# Patient Record
Sex: MALE | Race: ASIAN | NOT HISPANIC OR LATINO | URBAN - METROPOLITAN AREA
[De-identification: names, ages, dates, MRNs, and addresses within clinical notes are randomized per-mention and may not be internally consistent; named-entity substitution may affect disease eponyms.]

---

## 2023-08-29 ENCOUNTER — INPATIENT (INPATIENT)
Facility: HOSPITAL | Age: 88
LOS: 2 days | Discharge: HOME | End: 2023-09-01
Attending: INTERNAL MEDICINE | Admitting: INTERNAL MEDICINE
Payer: MEDICARE

## 2023-08-29 VITALS
DIASTOLIC BLOOD PRESSURE: 56 MMHG | OXYGEN SATURATION: 95 % | TEMPERATURE: 98 F | RESPIRATION RATE: 20 BRPM | SYSTOLIC BLOOD PRESSURE: 112 MMHG | HEART RATE: 84 BPM

## 2023-08-29 DIAGNOSIS — R06.02 SHORTNESS OF BREATH: ICD-10-CM

## 2023-08-29 LAB
ALBUMIN SERPL ELPH-MCNC: 3.3 G/DL — SIGNIFICANT CHANGE UP (ref 3.3–5)
ALP SERPL-CCNC: 60 U/L — SIGNIFICANT CHANGE UP (ref 40–120)
ALT FLD-CCNC: 15 U/L — SIGNIFICANT CHANGE UP (ref 4–41)
ANION GAP SERPL CALC-SCNC: 12 MMOL/L — SIGNIFICANT CHANGE UP (ref 7–14)
AST SERPL-CCNC: 14 U/L — SIGNIFICANT CHANGE UP (ref 4–40)
B PERT DNA SPEC QL NAA+PROBE: SIGNIFICANT CHANGE UP
B PERT+PARAPERT DNA PNL SPEC NAA+PROBE: SIGNIFICANT CHANGE UP
BASE EXCESS BLDV CALC-SCNC: -1.7 MMOL/L — SIGNIFICANT CHANGE UP (ref -2–3)
BASOPHILS # BLD AUTO: 0.04 K/UL — SIGNIFICANT CHANGE UP (ref 0–0.2)
BASOPHILS NFR BLD AUTO: 0.2 % — SIGNIFICANT CHANGE UP (ref 0–2)
BILIRUB SERPL-MCNC: 0.5 MG/DL — SIGNIFICANT CHANGE UP (ref 0.2–1.2)
BLOOD GAS VENOUS COMPREHENSIVE RESULT: SIGNIFICANT CHANGE UP
BORDETELLA PARAPERTUSSIS (RAPRVP): SIGNIFICANT CHANGE UP
BUN SERPL-MCNC: 30 MG/DL — HIGH (ref 7–23)
C PNEUM DNA SPEC QL NAA+PROBE: SIGNIFICANT CHANGE UP
CALCIUM SERPL-MCNC: 8.9 MG/DL — SIGNIFICANT CHANGE UP (ref 8.4–10.5)
CHLORIDE BLDV-SCNC: 95 MMOL/L — LOW (ref 96–108)
CHLORIDE SERPL-SCNC: 92 MMOL/L — LOW (ref 98–107)
CO2 BLDV-SCNC: 24.2 MMOL/L — SIGNIFICANT CHANGE UP (ref 22–26)
CO2 SERPL-SCNC: 22 MMOL/L — SIGNIFICANT CHANGE UP (ref 22–31)
CREAT SERPL-MCNC: 1.58 MG/DL — HIGH (ref 0.5–1.3)
EGFR: 42 ML/MIN/1.73M2 — LOW
EOSINOPHIL # BLD AUTO: 0.02 K/UL — SIGNIFICANT CHANGE UP (ref 0–0.5)
EOSINOPHIL NFR BLD AUTO: 0.1 % — SIGNIFICANT CHANGE UP (ref 0–6)
FLUAV SUBTYP SPEC NAA+PROBE: SIGNIFICANT CHANGE UP
FLUBV RNA SPEC QL NAA+PROBE: SIGNIFICANT CHANGE UP
GAS PNL BLDV: 126 MMOL/L — LOW (ref 136–145)
GLUCOSE BLDV-MCNC: 279 MG/DL — HIGH (ref 70–99)
GLUCOSE SERPL-MCNC: 270 MG/DL — HIGH (ref 70–99)
HADV DNA SPEC QL NAA+PROBE: SIGNIFICANT CHANGE UP
HCO3 BLDV-SCNC: 23 MMOL/L — SIGNIFICANT CHANGE UP (ref 22–29)
HCOV 229E RNA SPEC QL NAA+PROBE: SIGNIFICANT CHANGE UP
HCOV HKU1 RNA SPEC QL NAA+PROBE: SIGNIFICANT CHANGE UP
HCOV NL63 RNA SPEC QL NAA+PROBE: SIGNIFICANT CHANGE UP
HCOV OC43 RNA SPEC QL NAA+PROBE: SIGNIFICANT CHANGE UP
HCT VFR BLD CALC: 36.9 % — LOW (ref 39–50)
HCT VFR BLDA CALC: 34 % — LOW (ref 39–51)
HGB BLD CALC-MCNC: 11.2 G/DL — LOW (ref 12.6–17.4)
HGB BLD-MCNC: 12.2 G/DL — LOW (ref 13–17)
HMPV RNA SPEC QL NAA+PROBE: SIGNIFICANT CHANGE UP
HPIV1 RNA SPEC QL NAA+PROBE: SIGNIFICANT CHANGE UP
HPIV2 RNA SPEC QL NAA+PROBE: SIGNIFICANT CHANGE UP
HPIV3 RNA SPEC QL NAA+PROBE: SIGNIFICANT CHANGE UP
HPIV4 RNA SPEC QL NAA+PROBE: SIGNIFICANT CHANGE UP
IANC: 13.6 K/UL — HIGH (ref 1.8–7.4)
IMM GRANULOCYTES NFR BLD AUTO: 0.6 % — SIGNIFICANT CHANGE UP (ref 0–0.9)
LACTATE BLDV-MCNC: 2.2 MMOL/L — HIGH (ref 0.5–2)
LYMPHOCYTES # BLD AUTO: 11.9 % — LOW (ref 13–44)
LYMPHOCYTES # BLD AUTO: 2.05 K/UL — SIGNIFICANT CHANGE UP (ref 1–3.3)
M PNEUMO DNA SPEC QL NAA+PROBE: SIGNIFICANT CHANGE UP
MCHC RBC-ENTMCNC: 29 PG — SIGNIFICANT CHANGE UP (ref 27–34)
MCHC RBC-ENTMCNC: 33.1 GM/DL — SIGNIFICANT CHANGE UP (ref 32–36)
MCV RBC AUTO: 87.9 FL — SIGNIFICANT CHANGE UP (ref 80–100)
MONOCYTES # BLD AUTO: 1.38 K/UL — HIGH (ref 0–0.9)
MONOCYTES NFR BLD AUTO: 8 % — SIGNIFICANT CHANGE UP (ref 2–14)
NEUTROPHILS # BLD AUTO: 13.6 K/UL — HIGH (ref 1.8–7.4)
NEUTROPHILS NFR BLD AUTO: 79.2 % — HIGH (ref 43–77)
NRBC # BLD: 0 /100 WBCS — SIGNIFICANT CHANGE UP (ref 0–0)
NRBC # FLD: 0 K/UL — SIGNIFICANT CHANGE UP (ref 0–0)
NT-PROBNP SERPL-SCNC: 1975 PG/ML — HIGH
PCO2 BLDV: 38 MMHG — LOW (ref 42–55)
PH BLDV: 7.39 — SIGNIFICANT CHANGE UP (ref 7.32–7.43)
PLATELET # BLD AUTO: 358 K/UL — SIGNIFICANT CHANGE UP (ref 150–400)
PO2 BLDV: 54 MMHG — HIGH (ref 25–45)
POTASSIUM BLDV-SCNC: 4.9 MMOL/L — SIGNIFICANT CHANGE UP (ref 3.5–5.1)
POTASSIUM SERPL-MCNC: 4.8 MMOL/L — SIGNIFICANT CHANGE UP (ref 3.5–5.3)
POTASSIUM SERPL-SCNC: 4.8 MMOL/L — SIGNIFICANT CHANGE UP (ref 3.5–5.3)
PROT SERPL-MCNC: 7.8 G/DL — SIGNIFICANT CHANGE UP (ref 6–8.3)
RAPID RVP RESULT: SIGNIFICANT CHANGE UP
RBC # BLD: 4.2 M/UL — SIGNIFICANT CHANGE UP (ref 4.2–5.8)
RBC # FLD: 13 % — SIGNIFICANT CHANGE UP (ref 10.3–14.5)
RSV RNA SPEC QL NAA+PROBE: SIGNIFICANT CHANGE UP
RV+EV RNA SPEC QL NAA+PROBE: SIGNIFICANT CHANGE UP
SAO2 % BLDV: 84.4 % — SIGNIFICANT CHANGE UP (ref 67–88)
SARS-COV-2 RNA SPEC QL NAA+PROBE: SIGNIFICANT CHANGE UP
SODIUM SERPL-SCNC: 126 MMOL/L — LOW (ref 135–145)
TROPONIN T, HIGH SENSITIVITY RESULT: 106 NG/L — CRITICAL HIGH
TROPONIN T, HIGH SENSITIVITY RESULT: 98 NG/L — CRITICAL HIGH
WBC # BLD: 17.2 K/UL — HIGH (ref 3.8–10.5)
WBC # FLD AUTO: 17.2 K/UL — HIGH (ref 3.8–10.5)

## 2023-08-29 PROCEDURE — 99285 EMERGENCY DEPT VISIT HI MDM: CPT

## 2023-08-29 PROCEDURE — 99233 SBSQ HOSP IP/OBS HIGH 50: CPT

## 2023-08-29 PROCEDURE — 71046 X-RAY EXAM CHEST 2 VIEWS: CPT | Mod: 26

## 2023-08-29 RX ORDER — SODIUM CHLORIDE 9 MG/ML
500 INJECTION, SOLUTION INTRAVENOUS ONCE
Refills: 0 | Status: DISCONTINUED | OUTPATIENT
Start: 2023-08-29 | End: 2023-08-29

## 2023-08-29 RX ORDER — IPRATROPIUM/ALBUTEROL SULFATE 18-103MCG
3 AEROSOL WITH ADAPTER (GRAM) INHALATION ONCE
Refills: 0 | Status: COMPLETED | OUTPATIENT
Start: 2023-08-29 | End: 2023-08-29

## 2023-08-29 RX ORDER — FUROSEMIDE 40 MG
20 TABLET ORAL ONCE
Refills: 0 | Status: COMPLETED | OUTPATIENT
Start: 2023-08-29 | End: 2023-08-29

## 2023-08-29 RX ORDER — INSULIN LISPRO 100/ML
2 VIAL (ML) SUBCUTANEOUS ONCE
Refills: 0 | Status: COMPLETED | OUTPATIENT
Start: 2023-08-29 | End: 2023-08-29

## 2023-08-29 RX ADMIN — Medication 3 MILLILITER(S): at 18:53

## 2023-08-29 RX ADMIN — Medication 20 MILLIGRAM(S): at 22:37

## 2023-08-29 NOTE — ED ADULT NURSE NOTE - OBJECTIVE STATEMENT
Pt awake and alert, Phx COPD (not on home 02) HTN, DM2 presenting to ED for worsening shortness of breath worse with exertion over the pat 2 weeks. Pt tachypneic and mildly wheezing in ED, 02 saturation on room air between 94 and 97%, pt denies chest pain, fevers, sick contacts. 20g IV placed in left AC, labs sent. Pt given Albuterol treatment as ordered, comfort measures provided, call bell in reach. Awaiting imaging and disposition.

## 2023-08-29 NOTE — H&P ADULT - PROBLEM SELECTOR PLAN 5
-LA 2.2 on admission   -repeat for the AM  -secondary to decompensated HF vs infection (possible diverticulitis)

## 2023-08-29 NOTE — ED PROVIDER NOTE - PROGRESS NOTE DETAILS
proBNPLabs showing elevated.  Creatinine also mildly elevated from baseline.  Troponin 98-1 06, less than 20% delta.  Patient without shortness of breath or chest pain at bedside.  Leukocytosis noted however no clear infectious etiology at this time.  Blood and urine cultures and UA sent.  Discussed case with hospitalist will send off procalcitonin as well.  At this time overall suspicion for new CHF.  Will give Lasix coverage with Admelog.  Cardiology consulted.

## 2023-08-29 NOTE — H&P ADULT - PROBLEM SELECTOR PLAN 6
-at home on glimepiride 2 mg BID, jardiance 10 mg daily and metformin 500 mg in AM and 1000 mg in the PM.   -will do correctional insulin for now and 5 units of lantus for now, adjust based on insulin requirements   -A1C in AM  -BG ACHS

## 2023-08-29 NOTE — H&P ADULT - NSHPSOCIALHISTORY_GEN_ALL_CORE
Lives at home with wife. Not working. Quit 30 years, for 20 years x50 cig. former heavy drinker for 10 years, quit 6 months ago. No other drugs.

## 2023-08-29 NOTE — H&P ADULT - NSHPPHYSICALEXAM_GEN_ALL_CORE
Vital Signs Last 24 Hrs  T(C): 36.4 (30 Aug 2023 02:24), Max: 36.9 (29 Aug 2023 16:56)  T(F): 97.6 (30 Aug 2023 02:24), Max: 98.4 (29 Aug 2023 16:56)  HR: 84 (30 Aug 2023 02:24) (65 - 84)  BP: 129/59 (30 Aug 2023 02:24) (111/72 - 129/59)  BP(mean): --  RR: 20 (30 Aug 2023 02:24) (20 - 25)  SpO2: 98% (30 Aug 2023 02:24) (95% - 98%)    Parameters below as of 30 Aug 2023 02:24  Patient On (Oxygen Delivery Method): room air        CONSTITUTIONAL: Well-groomed, in no apparent distress  EYES: No conjunctival or scleral injection, non-icteric; PERRLA and symmetric  ENMT: No external nasal lesions; nasal mucosa not inflamed; normal dentition; oral mucosa with moist membranes  NECK: Trachea midline without palpable neck mass; thyroid not enlarged and non-tender, JVD 7 cm  RESPIRATORY: Breathing comfortably; lungs CTA without rales bilaterally, no wheezing or rhonchi.   CARDIOVASCULAR: +S1S2, RRR, with systolic murmur left sternal border and apex; no carotid bruits; pedal pulses full and symmetric; no lower extremity edema  GASTROINTESTINAL: No palpable masses, pain to palpation of the left abdomen, +BS throughout, no rebound/guarding; no hepatosplenomegaly; no hernia palpated  MUSCULOSKELETAL: no digital clubbing or cyanosis; no paraspinal tenderness;  normal strength and tone of extremities  SKIN: No rashes or ulcers noted; no subcutaneous nodules or induration palpable  NEUROLOGIC: CN II-XII intact; normal reflexes in upper and lower extremities; sensation intact in LEs b/l to light touch  PSYCHIATRIC: A+O x 3; mood and affect appropriate; appropriate insight and judgment

## 2023-08-29 NOTE — H&P ADULT - NSICDXPASTMEDICALHX_GEN_ALL_CORE_FT
PAST MEDICAL HISTORY:  COPD (chronic obstructive pulmonary disease)     DM (diabetes mellitus)     Former smoker     HLD (hyperlipidemia)     HTN (hypertension)

## 2023-08-29 NOTE — ED ADULT NURSE NOTE - NSFALLRISKINTERV_ED_ALL_ED

## 2023-08-29 NOTE — ED ADULT TRIAGE NOTE - CHIEF COMPLAINT QUOTE
Pt c/o intermittent CP, SOB x 2 weeks. Pt in no acute distress. SpO2 95% on RA. Pt sent to ED by cardiologist for further work up. PMHx COPD, DM2

## 2023-08-29 NOTE — H&P ADULT - HISTORY OF PRESENT ILLNESS
Mr. Bajwa is a 87 year old M with history of DM2 (non-insulin dependent, HTN, HLD. COPD who presents with chest pressure and dyspnea at rest for the last week. He describes the chest pressure as pinch like sensation as if plastic is sitting on his chest. He states it is non-exertional occurs when he is lying down, associated with orthopnea and shortness of breath. He states over the last week he has had a decline in his urine output with associated dysuria, which has progressed to not being able to urinate in the last day. He  Mr. Bajwa is a 87 year old M with history of DM2 (non-insulin dependent, HTN, HLD. COPD who presents with chest pressure and dyspnea at rest for the last week. He describes the chest pressure as pinch like sensation as if plastic is sitting on his chest (substernal). He states it is non-exertional occurs when he is lying down. He states over the last week he has had a decline in his urine output with associated dysuria, which has progressed to not being able to urinate in the last day. He  reports that the pain is intermittent and occurs when he is lying down (non-exertional). He denies numbness and tingling, diaphoresis, lightheadedness, palpitations, jaw pain, nausea, and any radiation. He reports that movement worsens his pain. The chest pain occurs for hours and lasts for a few times a week. The chest pain has been occurring for the past several weeks. He reports he has had shortness of breath with exertion, but has not worsened. He reports that he uses a cane at baseline. He reports orthopnea and shortness of breath. He states over the last week he has had a decline in his urine output with associated dysuria, which has progressed to not being able to urinate in the last day.  Currently denies having chest pain.  Denies lightheadedness, palpitations, syncope, or increasing lower extremity edema.   He was sent from  Dr. Duyen Black MD office.  Labs were sig for the following WBC 17, trop 98-> 106, BUN/Cr 30/1.58, , EGFR 42, pro-BNP 1975,, LA 2.2. EKG was sig for the following: normal sinus rhythm HR 79 t wave inversion V6, and QTc 403 ms. TTE 05/2019 showed EF 67%, grade 1 diastolic dysfunction, sclerotic aortic valve with mild AR,  mild pulm HTN measuring 43 mmHg. He states     He also reports he has lost 10 pounds in the last 6 months with associated decrease in appetite.  He reports a few months ago his urine test was positive for blood for which he had it worked up per patient.    Mr. Liao is a 87 year old M with history of DM2 (non-insulin dependent, HTN, HLD. COPD who presents with chest pressure and dyspnea at rest for the last week. He describes the chest pressure as pinch like sensation as if plastic is sitting on his chest (substernal). He states it is non-exertional occurs when he is lying down. He states over the last week he has had a decline in his urine output with associated dysuria, which has progressed to not being able to urinate in the last day. He  reports that the pain is intermittent and occurs when he is lying down (non-exertional). He denies numbness and tingling, diaphoresis, lightheadedness, palpitations, jaw pain, nausea, and any radiation. He reports that movement worsens his pain. The chest pain occurs for hours and lasts for a few times a week. The chest pain has been occurring for the past several weeks. He reports he has had shortness of breath with exertion, but has not worsened. He reports that he uses a cane at baseline. He reports orthopnea and shortness of breath. He states over the last week he has had a decline in his urine output with associated dysuria, which has progressed to not being able to urinate in the last day.  Currently denies having chest pain.  Denies lightheadedness, palpitations, syncope, or increasing lower extremity edema.   He was sent from  Dr. Duyen Black MD office.  Labs were sig for the following WBC 17, trop 98-> 106, BUN/Cr 30/1.58, , EGFR 42, pro-BNP 1975,, LA 2.2 and UA negative.  EKG was sig for the following: normal sinus rhythm HR 79 t wave inversion V6, and QTc 403 ms. TTE 05/2019 showed EF 67%, grade 1 diastolic dysfunction, sclerotic aortic valve with mild AR,  mild pulm HTN measuring 43 mmHg. He states he has had dysuria for 1 week and denies any hematuria. Also reports today he started to have left sided abdominal pain to palpation, noted when abdomen was palpated. CXR with bilateral reticular opacities.     He also reports he has lost 10 pounds in the last 6 months with associated decrease in appetite.  He reports a few months ago his urine test was positive for blood for which he had it worked up per patient.

## 2023-08-29 NOTE — H&P ADULT - PROBLEM SELECTOR PLAN 3
-Cr is 1.58, unknown baseline   -serum protein/Cr, Urine Na, and RBUS ordered to r/o obstruction   -UPEP/SPEP/serum and urine immunofixation and light chains ordered   -monitor intake and output every 4 hours   -consider renal consult pending above results.

## 2023-08-29 NOTE — H&P ADULT - PROBLEM SELECTOR PLAN 1
ddx: demand ischemia (secondary to likely infiltrative CM) vs ACS  -troponin 98->106, trend with CKMB to peak  -cardiac monitoring   -EKG in AM ordered   -TTE ordered   -cardiology/HF consult to be called in the AM

## 2023-08-29 NOTE — H&P ADULT - NSHPLABSRESULTS_GEN_ALL_CORE
12.2   17.20 )-----------( 358      ( 29 Aug 2023 18:39 )             36.9     126<L>  |  92<L>  |  30<H>  ----------------------------<  270<H>       4.8   |  22  |  1.58<H>    Ca    8.9      29 Aug 2023 18:39    TPro  7.8  /  Alb  3.3  /  TBili  0.5  /  DBili  x   /  AST  14  /  ALT  15  /  AlkPhos  60          18:39 - VBG - pH: 7.39  | pCO2: 38    | pO2: 54    | Lactate: 2.2        Procalcitonin - 0.08 (23 @ 20:28)      hs Troponin, T - 106 ng/L (23 @ 20:28)  hs Troponin, T - 98 ng/L (23 @ 18:39)      Pro-BNP: 1975 (23 @ 18:39)          Urinalysis Basic - ( 30 Aug 2023 00:26 )  Color: Yellow / Appearance: Clear / S.009 / pH: 6.0  Gluc: Negative mg/dL / Ketone: Negative mg/dL  / Bili: Negative / Urobili: 0.2 mg/dL   Blood: Negative / Protein: Negative mg/dL / Nitrite: Negative   Leuk Esterase: Negative / RBC: x / WBC x   Sq Epi: x / Non Sq Epi: x / Bacteria: x

## 2023-08-29 NOTE — H&P ADULT - PROBLEM SELECTOR PLAN 4
-WBC 17 and tenderness to abdomen with palpation  -CT abdomen and pelvis with oral contrast to r/o diverticulitis STAT  -Blood culturex2   -Will do ciprofloxacin IV BID and Flagyl IV TID  -UA negative, urine culture -pending   -consider surgical consult if LA continues to rise or abdominal pain worsens.  -continue to trend

## 2023-08-29 NOTE — H&P ADULT - PROBLEM SELECTOR PLAN 9
DVT prophylaxis: heparin 5000 units every 8 hours  GI prophylaxis: none   Diet: diabetes cardiac diet

## 2023-08-29 NOTE — ED PROVIDER NOTE - ATTENDING CONTRIBUTION TO CARE
87-year-old male past medical history of diabetes, hypertension, hypothermia presenting for shortness of breath and chest pain, increased urine production..  No known prior cardiac history per patient and son.  No recent stress test.  He denies fever, chills, nausea, vomiting, diaphoresis, abdominal pain, back pain, extremity pain or swelling.  Exam as above, wheezing/crackles throughout lung fields.  Differential diagnosis includes, but not limited to, CHF, pneumonia,  COPD.  Plan: Labs, chest x-ray, nebs, reassess.

## 2023-08-29 NOTE — H&P ADULT - PROBLEM SELECTOR PLAN 7
continue losartan 100n mg daily and amlodipine 2.5 mg daily with BP parameters   -Patient will likely need b-blocker, likely will dc amlodipine and started B blocker if HF reduced on TTE  -continue to monitor BP

## 2023-08-29 NOTE — ED PROVIDER NOTE - OBJECTIVE STATEMENT
88 yo M with hx of DM, HTN, HLD presenting w/ c/o of dyspnea with intermittent CP. Pt with no hx of cardiac disease. Last stress was normal in 2019. Pt has hx of extensive hx of smoking though he quit years ago. No fever, chills, nausea, vomiting, diarrhea. He has chest pain at rest, and notes he had a cough for 4 consecutive months. Pt also noticing increased sputum production. He denies any LE pain or swelling.

## 2023-08-29 NOTE — H&P ADULT - PROBLEM SELECTOR PLAN 2
ddx: Infiltrative CM vs Ischemic CM   -CXR with bilateral reticular opacities  -BNP 1975  -IV lasix 40 mg IV x1 now considering poor response to IV 20 mg lasix overnight, adjust diuresis based on urine output  -monitor intake and output every 4 hours  -HF consult in the AM to be called by day team.  -Team to order standing diuresis in the AM based on urine output

## 2023-08-29 NOTE — H&P ADULT - NSHPREVIEWOFSYSTEMS_GEN_ALL_CORE
Review of Systems:   CONSTITUTIONAL: No fever, +weight loss  EYES: No eye pain, visual disturbances, or discharge  ENMT:  No difficulty hearing, tinnitus, vertigo; No sinus or throat pain  RESPIRATORY: +SOB. No cough, wheezing, chills or hemoptysis  CARDIOVASCULAR: + chest pain, palpitations, dizziness, or leg swelling  GASTROINTESTINAL: + abdominal or epigastric pain. No nausea, vomiting, or hematemesis; No diarrhea or constipation. No melena or hematochezia.  GENITOURINARY: +dysuria, no frequency, hematuria, decrease urinary output   NEUROLOGICAL: No headaches, memory loss, loss of strength, numbness, or tremors  SKIN: No itching, burning, rashes, or lesions   ENDOCRINE: No heat or cold intolerance; No hair loss  MUSCULOSKELETAL: No joint pain or swelling; No muscle, back pain  PSYCHIATRIC: No depression, anxiety, mood swings, or difficulty sleeping

## 2023-08-29 NOTE — H&P ADULT - ASSESSMENT
Mr. Liao is a 87 year old M with history of DM2 (non-insulin dependent, HTN, HLD. COPD who presents with progressive chest pressure over the last few weeks. Patient has dyspnea at rest for the last week.

## 2023-08-29 NOTE — ED PROVIDER NOTE - CLINICAL SUMMARY MEDICAL DECISION MAKING FREE TEXT BOX
88 yo M with hx of DM, HTN, HLD presenting w/ c/o of dyspnea with intermittent CP. Differential diagnosis includes but is not limited to COPD exacerbation, ACS, pneumonia unlikely given prolonged course of cough, ILD, CHF possible less likely in absence of peripheral edema/crackles. would get labs, ecg, cxr, tx symptomatically with duonebs and reassess for improvement. pt needs PFTs and likely needs to be on maintenance medications for COPD.

## 2023-08-29 NOTE — ED PROVIDER NOTE - PHYSICAL EXAMINATION
General: well -appearing, no acute distress  Head: Atraumatic, normocephalic  Eyes: EOM grossly in tact, no scleral icterus, no discharge  Neurology: A&Ox 3, nonfocal, CLAY x 4  Respiratory: decreased air movement, wheezing throughout lung fields  CV: RRR, good s1/s2, no S3, Extremities warm and well perfused  Abdominal: Soft, non-distended, non-tender, no masses  Extremities: No edema, no deformities  Skin: warm and dry. No rashes

## 2023-08-30 DIAGNOSIS — R33.9 RETENTION OF URINE, UNSPECIFIED: ICD-10-CM

## 2023-08-30 DIAGNOSIS — E78.5 HYPERLIPIDEMIA, UNSPECIFIED: ICD-10-CM

## 2023-08-30 DIAGNOSIS — Z71.89 OTHER SPECIFIED COUNSELING: ICD-10-CM

## 2023-08-30 DIAGNOSIS — N17.9 ACUTE KIDNEY FAILURE, UNSPECIFIED: ICD-10-CM

## 2023-08-30 DIAGNOSIS — R79.89 OTHER SPECIFIED ABNORMAL FINDINGS OF BLOOD CHEMISTRY: ICD-10-CM

## 2023-08-30 DIAGNOSIS — R06.00 DYSPNEA, UNSPECIFIED: ICD-10-CM

## 2023-08-30 DIAGNOSIS — I10 ESSENTIAL (PRIMARY) HYPERTENSION: ICD-10-CM

## 2023-08-30 DIAGNOSIS — Z29.9 ENCOUNTER FOR PROPHYLACTIC MEASURES, UNSPECIFIED: ICD-10-CM

## 2023-08-30 DIAGNOSIS — R07.9 CHEST PAIN, UNSPECIFIED: ICD-10-CM

## 2023-08-30 DIAGNOSIS — D72.829 ELEVATED WHITE BLOOD CELL COUNT, UNSPECIFIED: ICD-10-CM

## 2023-08-30 DIAGNOSIS — E11.9 TYPE 2 DIABETES MELLITUS WITHOUT COMPLICATIONS: ICD-10-CM

## 2023-08-30 LAB
A1C WITH ESTIMATED AVERAGE GLUCOSE RESULT: 8 % — HIGH (ref 4–5.6)
ALBUMIN SERPL ELPH-MCNC: 3.5 G/DL — SIGNIFICANT CHANGE UP (ref 3.3–5)
ALP SERPL-CCNC: 69 U/L — SIGNIFICANT CHANGE UP (ref 40–120)
ALT FLD-CCNC: 17 U/L — SIGNIFICANT CHANGE UP (ref 4–41)
ANION GAP SERPL CALC-SCNC: 18 MMOL/L — HIGH (ref 7–14)
APPEARANCE UR: CLEAR — SIGNIFICANT CHANGE UP
APPEARANCE UR: CLEAR — SIGNIFICANT CHANGE UP
APTT BLD: 25.3 SEC — SIGNIFICANT CHANGE UP (ref 24.5–35.6)
AST SERPL-CCNC: 18 U/L — SIGNIFICANT CHANGE UP (ref 4–40)
BASOPHILS # BLD AUTO: 0.04 K/UL — SIGNIFICANT CHANGE UP (ref 0–0.2)
BASOPHILS NFR BLD AUTO: 0.2 % — SIGNIFICANT CHANGE UP (ref 0–2)
BILIRUB SERPL-MCNC: 0.6 MG/DL — SIGNIFICANT CHANGE UP (ref 0.2–1.2)
BILIRUB UR-MCNC: NEGATIVE — SIGNIFICANT CHANGE UP
BILIRUB UR-MCNC: NEGATIVE — SIGNIFICANT CHANGE UP
BUN SERPL-MCNC: 28 MG/DL — HIGH (ref 7–23)
CALCIUM SERPL-MCNC: 9.5 MG/DL — SIGNIFICANT CHANGE UP (ref 8.4–10.5)
CHLORIDE SERPL-SCNC: 91 MMOL/L — LOW (ref 98–107)
CHOLEST SERPL-MCNC: 168 MG/DL — SIGNIFICANT CHANGE UP
CK MB BLD-MCNC: 5.3 % — HIGH (ref 0–2.5)
CK MB CFR SERPL CALC: 4.7 NG/ML — SIGNIFICANT CHANGE UP
CK MB CFR SERPL CALC: 5 NG/ML — SIGNIFICANT CHANGE UP
CK SERPL-CCNC: 95 U/L — SIGNIFICANT CHANGE UP (ref 30–200)
CO2 SERPL-SCNC: 21 MMOL/L — LOW (ref 22–31)
COLOR SPEC: YELLOW — SIGNIFICANT CHANGE UP
COLOR SPEC: YELLOW — SIGNIFICANT CHANGE UP
CREAT ?TM UR-MCNC: 41 MG/DL — SIGNIFICANT CHANGE UP
CREAT SERPL-MCNC: 1.47 MG/DL — HIGH (ref 0.5–1.3)
DIFF PNL FLD: NEGATIVE — SIGNIFICANT CHANGE UP
DIFF PNL FLD: NEGATIVE — SIGNIFICANT CHANGE UP
EGFR: 46 ML/MIN/1.73M2 — LOW
EOSINOPHIL # BLD AUTO: 0.17 K/UL — SIGNIFICANT CHANGE UP (ref 0–0.5)
EOSINOPHIL NFR BLD AUTO: 1 % — SIGNIFICANT CHANGE UP (ref 0–6)
ESTIMATED AVERAGE GLUCOSE: 183 — SIGNIFICANT CHANGE UP
GLUCOSE BLDC GLUCOMTR-MCNC: 149 MG/DL — HIGH (ref 70–99)
GLUCOSE BLDC GLUCOMTR-MCNC: 162 MG/DL — HIGH (ref 70–99)
GLUCOSE BLDC GLUCOMTR-MCNC: 171 MG/DL — HIGH (ref 70–99)
GLUCOSE BLDC GLUCOMTR-MCNC: 235 MG/DL — HIGH (ref 70–99)
GLUCOSE BLDC GLUCOMTR-MCNC: 241 MG/DL — HIGH (ref 70–99)
GLUCOSE SERPL-MCNC: 190 MG/DL — HIGH (ref 70–99)
GLUCOSE UR QL: NEGATIVE MG/DL — SIGNIFICANT CHANGE UP
GLUCOSE UR QL: NEGATIVE MG/DL — SIGNIFICANT CHANGE UP
HCT VFR BLD CALC: 38.2 % — LOW (ref 39–50)
HDLC SERPL-MCNC: 62 MG/DL — SIGNIFICANT CHANGE UP
HGB BLD-MCNC: 12.5 G/DL — LOW (ref 13–17)
IANC: 11.94 K/UL — HIGH (ref 1.8–7.4)
IMM GRANULOCYTES NFR BLD AUTO: 0.7 % — SIGNIFICANT CHANGE UP (ref 0–0.9)
INR BLD: 1.16 RATIO — SIGNIFICANT CHANGE UP (ref 0.85–1.18)
KETONES UR-MCNC: NEGATIVE MG/DL — SIGNIFICANT CHANGE UP
KETONES UR-MCNC: NEGATIVE MG/DL — SIGNIFICANT CHANGE UP
LACTATE SERPL-SCNC: 1.6 MMOL/L — SIGNIFICANT CHANGE UP (ref 0.5–2)
LACTATE SERPL-SCNC: 1.6 MMOL/L — SIGNIFICANT CHANGE UP (ref 0.5–2)
LEGIONELLA AG UR QL: NEGATIVE — SIGNIFICANT CHANGE UP
LEUKOCYTE ESTERASE UR-ACNC: NEGATIVE — SIGNIFICANT CHANGE UP
LEUKOCYTE ESTERASE UR-ACNC: NEGATIVE — SIGNIFICANT CHANGE UP
LIPID PNL WITH DIRECT LDL SERPL: 90 MG/DL — SIGNIFICANT CHANGE UP
LYMPHOCYTES # BLD AUTO: 16.6 % — SIGNIFICANT CHANGE UP (ref 13–44)
LYMPHOCYTES # BLD AUTO: 2.72 K/UL — SIGNIFICANT CHANGE UP (ref 1–3.3)
MAGNESIUM SERPL-MCNC: 2.5 MG/DL — SIGNIFICANT CHANGE UP (ref 1.6–2.6)
MCHC RBC-ENTMCNC: 29.4 PG — SIGNIFICANT CHANGE UP (ref 27–34)
MCHC RBC-ENTMCNC: 32.7 GM/DL — SIGNIFICANT CHANGE UP (ref 32–36)
MCV RBC AUTO: 89.9 FL — SIGNIFICANT CHANGE UP (ref 80–100)
MONOCYTES # BLD AUTO: 1.35 K/UL — HIGH (ref 0–0.9)
MONOCYTES NFR BLD AUTO: 8.3 % — SIGNIFICANT CHANGE UP (ref 2–14)
NEUTROPHILS # BLD AUTO: 11.94 K/UL — HIGH (ref 1.8–7.4)
NEUTROPHILS NFR BLD AUTO: 73.2 % — SIGNIFICANT CHANGE UP (ref 43–77)
NITRITE UR-MCNC: NEGATIVE — SIGNIFICANT CHANGE UP
NITRITE UR-MCNC: NEGATIVE — SIGNIFICANT CHANGE UP
NON HDL CHOLESTEROL: 106 MG/DL — SIGNIFICANT CHANGE UP
NRBC # BLD: 0 /100 WBCS — SIGNIFICANT CHANGE UP (ref 0–0)
NRBC # FLD: 0 K/UL — SIGNIFICANT CHANGE UP (ref 0–0)
PH UR: 6 — SIGNIFICANT CHANGE UP (ref 5–8)
PH UR: 6 — SIGNIFICANT CHANGE UP (ref 5–8)
PHOSPHATE SERPL-MCNC: 3.9 MG/DL — SIGNIFICANT CHANGE UP (ref 2.5–4.5)
PLATELET # BLD AUTO: 409 K/UL — HIGH (ref 150–400)
POTASSIUM SERPL-MCNC: 4.3 MMOL/L — SIGNIFICANT CHANGE UP (ref 3.5–5.3)
POTASSIUM SERPL-SCNC: 4.3 MMOL/L — SIGNIFICANT CHANGE UP (ref 3.5–5.3)
PROCALCITONIN SERPL-MCNC: 0.08 NG/ML — SIGNIFICANT CHANGE UP (ref 0.02–0.1)
PROT ?TM UR-MCNC: 8 MG/DL — SIGNIFICANT CHANGE UP
PROT ?TM UR-MCNC: 9 MG/DL — SIGNIFICANT CHANGE UP
PROT SERPL-MCNC: 8.1 G/DL — SIGNIFICANT CHANGE UP (ref 6–8.3)
PROT SERPL-MCNC: 9 G/DL — HIGH (ref 6–8.3)
PROT UR-MCNC: NEGATIVE MG/DL — SIGNIFICANT CHANGE UP
PROT UR-MCNC: NEGATIVE MG/DL — SIGNIFICANT CHANGE UP
PROT/CREAT UR-RTO: 0.2 RATIO — SIGNIFICANT CHANGE UP (ref 0–0.2)
PROTHROM AB SERPL-ACNC: 13 SEC — SIGNIFICANT CHANGE UP (ref 9.5–13)
RBC # BLD: 4.25 M/UL — SIGNIFICANT CHANGE UP (ref 4.2–5.8)
RBC # FLD: 12.8 % — SIGNIFICANT CHANGE UP (ref 10.3–14.5)
SODIUM SERPL-SCNC: 130 MMOL/L — LOW (ref 135–145)
SODIUM UR-SCNC: 22 MMOL/L — SIGNIFICANT CHANGE UP
SP GR SPEC: 1.01 — SIGNIFICANT CHANGE UP (ref 1–1.03)
SP GR SPEC: 1.01 — SIGNIFICANT CHANGE UP (ref 1–1.03)
TRIGL SERPL-MCNC: 78 MG/DL — SIGNIFICANT CHANGE UP
TROPONIN T, HIGH SENSITIVITY RESULT: 92 NG/L — CRITICAL HIGH
UROBILINOGEN FLD QL: 0.2 MG/DL — SIGNIFICANT CHANGE UP (ref 0.2–1)
UROBILINOGEN FLD QL: 0.2 MG/DL — SIGNIFICANT CHANGE UP (ref 0.2–1)
WBC # BLD: 16.34 K/UL — HIGH (ref 3.8–10.5)
WBC # FLD AUTO: 16.34 K/UL — HIGH (ref 3.8–10.5)

## 2023-08-30 PROCEDURE — 99497 ADVNCD CARE PLAN 30 MIN: CPT | Mod: 25

## 2023-08-30 PROCEDURE — 93306 TTE W/DOPPLER COMPLETE: CPT | Mod: 26

## 2023-08-30 PROCEDURE — 86334 IMMUNOFIX E-PHORESIS SERUM: CPT | Mod: 26

## 2023-08-30 PROCEDURE — 76770 US EXAM ABDO BACK WALL COMP: CPT | Mod: 26

## 2023-08-30 PROCEDURE — 71250 CT THORAX DX C-: CPT | Mod: 26

## 2023-08-30 PROCEDURE — 99223 1ST HOSP IP/OBS HIGH 75: CPT | Mod: 25

## 2023-08-30 PROCEDURE — 84166 PROTEIN E-PHORESIS/URINE/CSF: CPT | Mod: 26

## 2023-08-30 PROCEDURE — 86335 IMMUNFIX E-PHORSIS/URINE/CSF: CPT | Mod: 26

## 2023-08-30 PROCEDURE — 99222 1ST HOSP IP/OBS MODERATE 55: CPT

## 2023-08-30 RX ORDER — TAMSULOSIN HYDROCHLORIDE 0.4 MG/1
0.4 CAPSULE ORAL AT BEDTIME
Refills: 0 | Status: DISCONTINUED | OUTPATIENT
Start: 2023-08-30 | End: 2023-09-01

## 2023-08-30 RX ORDER — HEPARIN SODIUM 5000 [USP'U]/ML
5000 INJECTION INTRAVENOUS; SUBCUTANEOUS EVERY 8 HOURS
Refills: 0 | Status: DISCONTINUED | OUTPATIENT
Start: 2023-08-30 | End: 2023-08-31

## 2023-08-30 RX ORDER — DEXTROSE 50 % IN WATER 50 %
25 SYRINGE (ML) INTRAVENOUS ONCE
Refills: 0 | Status: DISCONTINUED | OUTPATIENT
Start: 2023-08-30 | End: 2023-09-01

## 2023-08-30 RX ORDER — MONTELUKAST 4 MG/1
10 TABLET, CHEWABLE ORAL DAILY
Refills: 0 | Status: DISCONTINUED | OUTPATIENT
Start: 2023-08-30 | End: 2023-09-01

## 2023-08-30 RX ORDER — SIMETHICONE 80 MG/1
80 TABLET, CHEWABLE ORAL EVERY 6 HOURS
Refills: 0 | Status: DISCONTINUED | OUTPATIENT
Start: 2023-08-30 | End: 2023-09-01

## 2023-08-30 RX ORDER — DEXTROSE 50 % IN WATER 50 %
12.5 SYRINGE (ML) INTRAVENOUS ONCE
Refills: 0 | Status: DISCONTINUED | OUTPATIENT
Start: 2023-08-30 | End: 2023-09-01

## 2023-08-30 RX ORDER — SODIUM CHLORIDE 9 MG/ML
1000 INJECTION, SOLUTION INTRAVENOUS
Refills: 0 | Status: DISCONTINUED | OUTPATIENT
Start: 2023-08-30 | End: 2023-09-01

## 2023-08-30 RX ORDER — FUROSEMIDE 40 MG
20 TABLET ORAL
Refills: 0 | Status: DISCONTINUED | OUTPATIENT
Start: 2023-08-30 | End: 2023-08-30

## 2023-08-30 RX ORDER — INSULIN LISPRO 100/ML
VIAL (ML) SUBCUTANEOUS
Refills: 0 | Status: DISCONTINUED | OUTPATIENT
Start: 2023-08-30 | End: 2023-09-01

## 2023-08-30 RX ORDER — AMLODIPINE BESYLATE 2.5 MG/1
2.5 TABLET ORAL DAILY
Refills: 0 | Status: DISCONTINUED | OUTPATIENT
Start: 2023-08-30 | End: 2023-08-31

## 2023-08-30 RX ORDER — ATORVASTATIN CALCIUM 80 MG/1
10 TABLET, FILM COATED ORAL AT BEDTIME
Refills: 0 | Status: DISCONTINUED | OUTPATIENT
Start: 2023-08-30 | End: 2023-09-01

## 2023-08-30 RX ORDER — POLYETHYLENE GLYCOL 3350 17 G/17G
17 POWDER, FOR SOLUTION ORAL
Refills: 0 | Status: DISCONTINUED | OUTPATIENT
Start: 2023-08-30 | End: 2023-09-01

## 2023-08-30 RX ORDER — CEFTRIAXONE 500 MG/1
1000 INJECTION, POWDER, FOR SOLUTION INTRAMUSCULAR; INTRAVENOUS EVERY 24 HOURS
Refills: 0 | Status: DISCONTINUED | OUTPATIENT
Start: 2023-08-30 | End: 2023-08-30

## 2023-08-30 RX ORDER — LANOLIN ALCOHOL/MO/W.PET/CERES
3 CREAM (GRAM) TOPICAL AT BEDTIME
Refills: 0 | Status: DISCONTINUED | OUTPATIENT
Start: 2023-08-30 | End: 2023-09-01

## 2023-08-30 RX ORDER — INSULIN LISPRO 100/ML
VIAL (ML) SUBCUTANEOUS AT BEDTIME
Refills: 0 | Status: DISCONTINUED | OUTPATIENT
Start: 2023-08-30 | End: 2023-09-01

## 2023-08-30 RX ORDER — ALBUTEROL 90 UG/1
2 AEROSOL, METERED ORAL EVERY 6 HOURS
Refills: 0 | Status: DISCONTINUED | OUTPATIENT
Start: 2023-08-30 | End: 2023-09-01

## 2023-08-30 RX ORDER — CIPROFLOXACIN LACTATE 400MG/40ML
VIAL (ML) INTRAVENOUS
Refills: 0 | Status: DISCONTINUED | OUTPATIENT
Start: 2023-08-30 | End: 2023-08-31

## 2023-08-30 RX ORDER — INSULIN GLARGINE 100 [IU]/ML
5 INJECTION, SOLUTION SUBCUTANEOUS AT BEDTIME
Refills: 0 | Status: DISCONTINUED | OUTPATIENT
Start: 2023-08-30 | End: 2023-09-01

## 2023-08-30 RX ORDER — ACETAMINOPHEN 500 MG
650 TABLET ORAL EVERY 6 HOURS
Refills: 0 | Status: DISCONTINUED | OUTPATIENT
Start: 2023-08-30 | End: 2023-09-01

## 2023-08-30 RX ORDER — LOSARTAN POTASSIUM 100 MG/1
100 TABLET, FILM COATED ORAL DAILY
Refills: 0 | Status: DISCONTINUED | OUTPATIENT
Start: 2023-08-30 | End: 2023-08-31

## 2023-08-30 RX ORDER — FUROSEMIDE 40 MG
20 TABLET ORAL DAILY
Refills: 0 | Status: DISCONTINUED | OUTPATIENT
Start: 2023-08-30 | End: 2023-08-30

## 2023-08-30 RX ORDER — METRONIDAZOLE 500 MG
500 TABLET ORAL EVERY 8 HOURS
Refills: 0 | Status: DISCONTINUED | OUTPATIENT
Start: 2023-08-30 | End: 2023-08-31

## 2023-08-30 RX ORDER — CIPROFLOXACIN LACTATE 400MG/40ML
400 VIAL (ML) INTRAVENOUS ONCE
Refills: 0 | Status: COMPLETED | OUTPATIENT
Start: 2023-08-30 | End: 2023-08-30

## 2023-08-30 RX ORDER — GLUCAGON INJECTION, SOLUTION 0.5 MG/.1ML
1 INJECTION, SOLUTION SUBCUTANEOUS ONCE
Refills: 0 | Status: DISCONTINUED | OUTPATIENT
Start: 2023-08-30 | End: 2023-09-01

## 2023-08-30 RX ORDER — CIPROFLOXACIN LACTATE 400MG/40ML
400 VIAL (ML) INTRAVENOUS EVERY 12 HOURS
Refills: 0 | Status: DISCONTINUED | OUTPATIENT
Start: 2023-08-30 | End: 2023-08-31

## 2023-08-30 RX ORDER — FUROSEMIDE 40 MG
40 TABLET ORAL ONCE
Refills: 0 | Status: COMPLETED | OUTPATIENT
Start: 2023-08-30 | End: 2023-08-30

## 2023-08-30 RX ORDER — DEXTROSE 50 % IN WATER 50 %
15 SYRINGE (ML) INTRAVENOUS ONCE
Refills: 0 | Status: DISCONTINUED | OUTPATIENT
Start: 2023-08-30 | End: 2023-09-01

## 2023-08-30 RX ADMIN — HEPARIN SODIUM 5000 UNIT(S): 5000 INJECTION INTRAVENOUS; SUBCUTANEOUS at 06:25

## 2023-08-30 RX ADMIN — Medication 10 MILLIGRAM(S): at 17:56

## 2023-08-30 RX ADMIN — Medication 1: at 13:42

## 2023-08-30 RX ADMIN — HEPARIN SODIUM 5000 UNIT(S): 5000 INJECTION INTRAVENOUS; SUBCUTANEOUS at 21:31

## 2023-08-30 RX ADMIN — HEPARIN SODIUM 5000 UNIT(S): 5000 INJECTION INTRAVENOUS; SUBCUTANEOUS at 13:43

## 2023-08-30 RX ADMIN — TAMSULOSIN HYDROCHLORIDE 0.4 MILLIGRAM(S): 0.4 CAPSULE ORAL at 21:30

## 2023-08-30 RX ADMIN — POLYETHYLENE GLYCOL 3350 17 GRAM(S): 17 POWDER, FOR SOLUTION ORAL at 17:56

## 2023-08-30 RX ADMIN — Medication 3 MILLIGRAM(S): at 21:31

## 2023-08-30 RX ADMIN — Medication 200 MILLIGRAM(S): at 18:15

## 2023-08-30 RX ADMIN — Medication 2: at 09:29

## 2023-08-30 RX ADMIN — POLYETHYLENE GLYCOL 3350 17 GRAM(S): 17 POWDER, FOR SOLUTION ORAL at 13:43

## 2023-08-30 RX ADMIN — Medication 200 MILLIGRAM(S): at 07:30

## 2023-08-30 RX ADMIN — Medication 100 MILLIGRAM(S): at 09:30

## 2023-08-30 RX ADMIN — MONTELUKAST 10 MILLIGRAM(S): 4 TABLET, CHEWABLE ORAL at 13:43

## 2023-08-30 RX ADMIN — Medication 40 MILLIGRAM(S): at 06:25

## 2023-08-30 RX ADMIN — ATORVASTATIN CALCIUM 10 MILLIGRAM(S): 80 TABLET, FILM COATED ORAL at 21:30

## 2023-08-30 RX ADMIN — INSULIN GLARGINE 5 UNIT(S): 100 INJECTION, SOLUTION SUBCUTANEOUS at 21:30

## 2023-08-30 RX ADMIN — AMLODIPINE BESYLATE 2.5 MILLIGRAM(S): 2.5 TABLET ORAL at 06:24

## 2023-08-30 RX ADMIN — LOSARTAN POTASSIUM 100 MILLIGRAM(S): 100 TABLET, FILM COATED ORAL at 06:24

## 2023-08-30 RX ADMIN — Medication 100 MILLIGRAM(S): at 17:56

## 2023-08-30 NOTE — PROGRESS NOTE ADULT - PROBLEM SELECTOR PLAN 5
-WBC 17 and tenderness to abdomen with palpation  -given benign exam today, hold off on CT abd  -bowel regimen-may need enema  -Blood culturex2 thus far negative  -Will do ciprofloxacin IV BID and Flagyl IV TID->will plan  -UA negative, urine culture -pending   -LA normalized

## 2023-08-30 NOTE — PROGRESS NOTE ADULT - PROBLEM SELECTOR PLAN 2
ddx: ischemic CM, possibly intrinsic lung disease  -CXR with bilateral reticular opacities  -BNP 1975  -c/w lasix 20mg IV daily  -appreciate cards input  -f/u echo  -check CT chest ddx: ischemic CM, possibly intrinsic lung disease?  -CXR with bilateral reticular opacities  -BNP 1975  -c/w lasix 20mg IV daily  -appreciate cards input  -f/u echo  -check CT chest-to rule out interstitial lung disease

## 2023-08-30 NOTE — CONSULT NOTE ADULT - ASSESSMENT
87 year old M with history of DM2, HTN, HLD. COPD sent from the office of Dr. Duyen Black.  Pt presents with mild, intermittent chest pain. Chest pain is reported to be substernal, "tight", mild in intensity, non-radiating, non-exertional and without any associated symptoms. There are no specific alleviating or exacerbating factors.  Duration of chest pain is for hours, occuring few times a week. It has been ongoing for past few weeks. There is no increase in severity or frequency of the chest pain.  Independent of chest pain, patient also reports dyspnea on exertion.  It improves with rest. Dyspnea has been ongoing for past few months.  There is no increase in severity or frequency.   He states over the last week he has had a decline in his urine output with associated dysuria, which has progressed to not being able to urinate in the last day.      # Elevated Troponin  - Patient with elevated CE 98-->106  - ScR elevated 1.58  - HD stable, not in decompensated HF state  - Impression- demand ischemia in the setting of elevated BNP  - Received one dose of Lasix 20mg IVP in ED, would continue 20mg daily and monitor strict i/o  - EKG show showed NSR@79BPM with TWI in V6  - Serial EKG PRN to assess for ST changes  - Continuous cardiac monitoring to monitor for arrhythmias  - CBC, CMP, coags, HbA1C, TSH, lipids for comorbidities, Trend cardiac enzymes  - ECHO: TTE in am  - EKG and CE not consistent with ACS, would not treat for acs        Thank you, if any questions or clinical situation changes please call spectra #[id number].  Case discussed with on call [facility] fellow Dr. Sunshine Woodall    Attending Attestation to follow   87 year old M with history of DM2, HTN, HLD. COPD sent from the office of Dr. Duyen Black.  Pt presents with mild, intermittent chest pain. Chest pain is reported to be substernal, "tight", mild in intensity, non-radiating, non-exertional and without any associated symptoms. There are no specific alleviating or exacerbating factors.  Duration of chest pain is for hours, occuring few times a week. It has been ongoing for past few weeks. There is no increase in severity or frequency of the chest pain.  Independent of chest pain, patient also reports dyspnea on exertion.  It improves with rest. Dyspnea has been ongoing for past few months.  There is no increase in severity or frequency.   He states over the last week he has had a decline in his urine output with associated dysuria, which has progressed to not being able to urinate in the last day.      # Elevated Troponin  - Patient with elevated CE 98-->106  - ScR elevated 1.58  - HD stable, not in decompensated HF state  - Impression- demand ischemia in the setting of elevated BNP  - Received one dose of Lasix 20mg IVP in ED, would continue 20mg daily and monitor strict i/o  - EKG show showed NSR@79BPM with TWI in I, aVL, V5 - V6  - Serial EKG PRN to assess for ST changes  - Continuous cardiac monitoring to monitor for arrhythmias  - CBC, CMP, coags, HbA1C, TSH, lipids for comorbidities, Trend cardiac enzymes  - ECHO: TTE in am  - EKG and CE not consistent with ACS, would not treat for acs        Thank you, if any questions or clinical situation changes please call spectra #[id number].  Case discussed with on call [facility] fellow Dr. Sunshine Woodall    Attending Attestation to follow

## 2023-08-30 NOTE — PROGRESS NOTE ADULT - PROBLEM SELECTOR PLAN 3
-Cr is 1.58, unknown baseline, improving with diuresis  -serum protein/Cr, Urine Na, and RBUS ordered to r/o obstruction   -UPEP/SPEP/serum and urine immunofixation and light chains ordered   -monitor intake and output every 4 hours   -consider renal consult pending above results.

## 2023-08-30 NOTE — CONSULT NOTE ADULT - SUBJECTIVE AND OBJECTIVE BOX
HPI: 87 year old M with history of DM2, HTN, HLD. COPD sent from Dr. Duyen Black MD office.  Pt presents with mild, intermittent chest pain. Chest pain is reported to be substernal, "tight" in quality, mild in intensity, non-radiating, non-exertional and without any associated symptoms. There are no specific alleviating or exacerbating factors.  Duration of chest pain is for hours, occuring few times a week. It has been ongoing for past few weeks. There is no increase in severity or frequency of the chest pain.  Independent of chest pain, patient also reports dyspnea on exertion.  It improves with rest. Dyspnea has been ongoing for past few months.  There is no increase in severity or frequency.   He states over the last week he has had a decline in his urine output with associated dysuria, which has progressed to not being able to urinate in the last day.  Currently denies having chest pain.  Denies lightheadedness, palpitations, syncope, or increasing lower extremity edema.       Allergies    No Known Allergies    Intolerances    	  PAST MEDICAL & SURGICAL HISTORY:  DM (diabetes mellitus)  HTN (hypertension)  HLD (hyperlipidemia)      SUBSTANCE USE  Tobacco Usage:  denies  Alcohol Usage: denies  Recreational drugs: denies      REVIEW OF SYSTEMS:  CV: chest pain (-), palpitation (-), orthopnea (-), PND (-), edema (-)  PULM: SOB (-), cough (-), wheezing (-), hemoptysis (-).   CONST: fever (-), chills (-) or fatigue (-)  GI: abdominal distension (-), abdominal pain (-) , nausea/vomiting (-), hematemesis, (-), melena (-), hematochezia (-)  : dysuria (-), frequency (-), hematuria (-).   NEURO: numbness (-), weakness (-), dizziness (-)  SKIN: itching (-), rash (-)  HEENT:  visual changes (-); vertigo or throat pain (-);  neck stiffness (-)   All other review of systems is negative unless indicated above.      T(C): 36.3 (08-30-23 @ 00:18), Max: 36.9 (08-29-23 @ 16:56)  HR: 72 (08-30-23 @ 00:18) (65 - 84)  BP: 111/72 (08-30-23 @ 00:18) (111/72 - 114/107)  RR: 21 (08-30-23 @ 00:18) (20 - 25)  SpO2: 96% (08-30-23 @ 00:18) (95% - 97%)  Wt(kg): --  I&O's Summary      Physical Exam:  General: NAD  Cardiovascular: Normal S1 S2, No JVD, No murmurs, No edema  Respiratory: Lungs clear to auscultation	  Gastrointestinal:  Soft, Non-tender, + BS	  Skin: warm and dry, No rashes, No ecchymoses, No cyanosis	  Extremities:  No clubbing, cyanosis or edema  Vascular: Peripheral pulses palpable 2+ bilaterally    CBC Full  -  ( 29 Aug 2023 18:39 )  WBC Count : 17.20 K/uL  Hemoglobin : 12.2 g/dL  Hematocrit : 36.9 %  Platelet Count - Automated : 358 K/uL  Mean Cell Volume : 87.9 fL  Mean Cell Hemoglobin : 29.0 pg  Mean Cell Hemoglobin Concentration : 33.1 gm/dL  Auto Neutrophil # : 13.60 K/uL  Auto Lymphocyte # : 2.05 K/uL  Auto Monocyte # : 1.38 K/uL  Auto Eosinophil # : 0.02 K/uL  Auto Basophil # : 0.04 K/uL  Auto Neutrophil % : 79.2 %  Auto Lymphocyte % : 11.9 %  Auto Monocyte % : 8.0 %  Auto Eosinophil % : 0.1 %  Auto Basophil % : 0.2 %    08-29    126<L>  |  92<L>  |  30<H>  ----------------------------<  270<H>  4.8   |  22  |  1.58<H>    Ca    8.9      29 Aug 2023 18:39    TPro  7.8  /  Alb  3.3  /  TBili  0.5  /  DBili  x   /  AST  14  /  ALT  15  /  AlkPhos  60  08-29    proBNP: 1975  Lipid Profile: --  HgA1c: --  TSH: --  CARDIAC MARKERS ( 29 Aug 2023 20:28 )  106 ng/L / x     / x     / 95 U/L / x     / 5.0 ng/mL  CARDIAC MARKERS ( 29 Aug 2023 18:39 )  98 ng/L / x     / x     / x     / x     / x            Diagnostic testing:  cath:--  echo:--  CXR:    < from: Xray Chest 2 Views PA/Lat (08.29.23 @ 20:18) >  FINDINGS:    The heart is enlarged.  Bilateral reticular opacities. No focal lung consolidation.  There is no pneumothorax or pleural effusion.    IMPRESSION:  Bilateral reticular opacities.    < end of copied text >

## 2023-08-30 NOTE — PROGRESS NOTE ADULT - SUBJECTIVE AND OBJECTIVE BOX
Abena Prieto MD   [facility] Medicine  Teams preferred  Pager: [phone]    Patient is a 87y old  Male who presents with a chief complaint of NSTEMI, renal failure, and CHF (30 Aug 2023 01:23)      INTERVAL HPI/OVERNIGHT EVENTS: No events overnight. Pt reports feeling much better. Denies any further episodes of CP. No abdominal pain. mild dysuria and having trouble urinating.    Per family has had CARVAJAL for several weeks, particularly with going up the stairs and walking. at times, has some intermittent chest pain. Yesterday, on presentation, had chest pain associated with SOB, now resolved. +trops. Cards consult appreciated, rec stress test.    MEDICATIONS  (STANDING):  amLODIPine   Tablet 2.5 milliGRAM(s) Oral daily  atorvastatin 10 milliGRAM(s) Oral at bedtime  ciprofloxacin   IVPB 400 milliGRAM(s) IV Intermittent every 12 hours  ciprofloxacin   IVPB      dextrose 5%. 1000 milliLiter(s) (50 mL/Hr) IV Continuous <Continuous>  dextrose 5%. 1000 milliLiter(s) (100 mL/Hr) IV Continuous <Continuous>  dextrose 50% Injectable 25 Gram(s) IV Push once  dextrose 50% Injectable 12.5 Gram(s) IV Push once  dextrose 50% Injectable 25 Gram(s) IV Push once  glucagon  Injectable 1 milliGRAM(s) IntraMuscular once  heparin   Injectable 5000 Unit(s) SubCutaneous every 8 hours  insulin glargine Injectable (LANTUS) 5 Unit(s) SubCutaneous at bedtime  insulin lispro (ADMELOG) corrective regimen sliding scale   SubCutaneous three times a day before meals  insulin lispro (ADMELOG) corrective regimen sliding scale   SubCutaneous at bedtime  losartan 100 milliGRAM(s) Oral daily  metroNIDAZOLE  IVPB 500 milliGRAM(s) IV Intermittent every 8 hours  montelukast 10 milliGRAM(s) Oral daily  polyethylene glycol 3350 17 Gram(s) Oral two times a day    MEDICATIONS  (PRN):  acetaminophen     Tablet .. 650 milliGRAM(s) Oral every 6 hours PRN Mild Pain (1 - 3)  albuterol    90 MICROgram(s) HFA Inhaler 2 Puff(s) Inhalation every 6 hours PRN for bronchospasm  dextrose Oral Gel 15 Gram(s) Oral once PRN Blood Glucose LESS THAN 70 milliGRAM(s)/deciliter  melatonin 3 milliGRAM(s) Oral at bedtime PRN Insomnia      Allergies    No Known Allergies    Intolerances        REVIEW OF SYSTEMS:  Please see interval HPI:    Vital Signs Last 24 Hrs  T(C): 36.4 (30 Aug 2023 10:25), Max: 36.9 (29 Aug 2023 16:56)  T(F): 97.6 (30 Aug 2023 10:25), Max: 98.4 (29 Aug 2023 16:56)  HR: 79 (30 Aug 2023 10:25) (65 - 84)  BP: 102/50 (30 Aug 2023 10:25) (102/50 - 129/59)  BP(mean): --  RR: 19 (30 Aug 2023 10:25) (19 - 25)  SpO2: 97% (30 Aug 2023 10:25) (95% - 100%)    Parameters below as of 30 Aug 2023 10:25  Patient On (Oxygen Delivery Method): room air      I&O's Detail    29 Aug 2023 07:01  -  30 Aug 2023 07:00  --------------------------------------------------------  IN:  Total IN: 0 mL    OUT:    Voided (mL): 250 mL  Total OUT: 250 mL    Total NET: -250 mL            PHYSICAL EXAM:  Vital Signs Last 24 Hrs  T(C): 36.4 (30 Aug 2023 10:25), Max: 36.9 (29 Aug 2023 16:56)  T(F): 97.6 (30 Aug 2023 10:25), Max: 98.4 (29 Aug 2023 16:56)  HR: 79 (30 Aug 2023 10:25) (65 - 84)  BP: 102/50 (30 Aug 2023 10:25) (102/50 - 129/59)  BP(mean): --  RR: 19 (30 Aug 2023 10:25) (19 - 25)  SpO2: 97% (30 Aug 2023 10:25) (95% - 100%)    Parameters below as of 30 Aug 2023 10:25  Patient On (Oxygen Delivery Method): room air      CONSTITUTIONAL: NAD,   ENMT: Moist oral mucosa,   RESPIRATORY: Normal respiratory effort; crackles faint at the bases  CARDIOVASCULAR: Regular rate and rhythm, normal S1 and S2, +mild systolic murmur no rub/gallop; No lower extremity edema;   ABDOMEN: Nontender to palpation, normoactive bowel sounds, no rebound/guarding; No hepatosplenomegaly  EXT: no edema b/l  NEUROLOGY: alert, following commands  SKIN: No rashes; no palpable lesions      LABS:                        12.5   16.34 )-----------( 409      ( 30 Aug 2023 07:53 )             38.2     30 Aug 2023 07:21    130    |  91     |  28     ----------------------------<  190    4.3     |  21     |  1.47     Ca    9.5        30 Aug 2023 07:21  Phos  3.9       30 Aug 2023 07:21  Mg     2.50      30 Aug 2023 07:21    TPro  8.1    /  Alb  3.5    /  TBili  0.6    /  DBili  x      /  AST  18     /  ALT  17     /  AlkPhos  69     30 Aug 2023 07:21    PT/INR - ( 30 Aug 2023 07:21 )   PT: 13.0 sec;   INR: 1.16 ratio         PTT - ( 30 Aug 2023 07:53 )  PTT:25.3 sec  CAPILLARY BLOOD GLUCOSE      POCT Blood Glucose.: 241 mg/dL (30 Aug 2023 08:33)  POCT Blood Glucose.: 162 mg/dL (30 Aug 2023 00:22)  POCT Blood Glucose.: 304 mg/dL (29 Aug 2023 16:59)    BLOOD CULTURE    RADIOLOGY & ADDITIONAL TESTS:    Imaging Personally Reviewed:  [ ] YES     Consultant(s) Notes Reviewed:      Care Discussed with Consultants/Other Providers:

## 2023-08-30 NOTE — PROGRESS NOTE ADULT - PROBLEM SELECTOR PLAN 7
-at home on glimepiride 2 mg BID, jardiance 10 mg daily and metformin 500 mg in AM and 1000 mg in the PM.   -will do correctional insulin for now and 5 units of lantus for now, adjust based on insulin requirements   -A1C is 8  -BG ACHS

## 2023-08-30 NOTE — PATIENT PROFILE ADULT - FALL HARM RISK - TYPE OF ASSESSMENT
HR improved, 51-60s   -Hold Atenolol, dc home without it.
s/p SICU stay for postop hypotension  -BP uptrending  -resume Amlodipine 5mg, uptitrate PRN
s/p SICU stay for postop hypotension  -Monitor BP, currently stable off amlodipine, atenolol, lasix   -Will resume meds as needed
HR 50s, asymptomatic  - Atenolol discontinued
Admission

## 2023-08-30 NOTE — PATIENT PROFILE ADULT - FALL HARM RISK - HARM RISK INTERVENTIONS

## 2023-08-31 DIAGNOSIS — I48.91 UNSPECIFIED ATRIAL FIBRILLATION: ICD-10-CM

## 2023-08-31 LAB
-  COAGULASE NEGATIVE STAPHYLOCOCCUS: SIGNIFICANT CHANGE UP
A1C WITH ESTIMATED AVERAGE GLUCOSE RESULT: 7.9 % — HIGH (ref 4–5.6)
ANION GAP SERPL CALC-SCNC: 12 MMOL/L — SIGNIFICANT CHANGE UP (ref 7–14)
ANION GAP SERPL CALC-SCNC: 13 MMOL/L — SIGNIFICANT CHANGE UP (ref 7–14)
BASOPHILS # BLD AUTO: 0.04 K/UL — SIGNIFICANT CHANGE UP (ref 0–0.2)
BASOPHILS NFR BLD AUTO: 0.3 % — SIGNIFICANT CHANGE UP (ref 0–2)
BUN SERPL-MCNC: 34 MG/DL — HIGH (ref 7–23)
BUN SERPL-MCNC: 34 MG/DL — HIGH (ref 7–23)
CALCIUM SERPL-MCNC: 8.7 MG/DL — SIGNIFICANT CHANGE UP (ref 8.4–10.5)
CALCIUM SERPL-MCNC: 9 MG/DL — SIGNIFICANT CHANGE UP (ref 8.4–10.5)
CHLORIDE SERPL-SCNC: 92 MMOL/L — LOW (ref 98–107)
CHLORIDE SERPL-SCNC: 94 MMOL/L — LOW (ref 98–107)
CO2 SERPL-SCNC: 23 MMOL/L — SIGNIFICANT CHANGE UP (ref 22–31)
CO2 SERPL-SCNC: 23 MMOL/L — SIGNIFICANT CHANGE UP (ref 22–31)
CREAT ?TM UR-MCNC: 70 MG/DL — SIGNIFICANT CHANGE UP
CREAT SERPL-MCNC: 1.38 MG/DL — HIGH (ref 0.5–1.3)
CREAT SERPL-MCNC: 1.55 MG/DL — HIGH (ref 0.5–1.3)
CULTURE RESULTS: SIGNIFICANT CHANGE UP
CULTURE RESULTS: SIGNIFICANT CHANGE UP
EGFR: 43 ML/MIN/1.73M2 — LOW
EGFR: 49 ML/MIN/1.73M2 — LOW
EOSINOPHIL # BLD AUTO: 0.13 K/UL — SIGNIFICANT CHANGE UP (ref 0–0.5)
EOSINOPHIL NFR BLD AUTO: 1 % — SIGNIFICANT CHANGE UP (ref 0–6)
ESTIMATED AVERAGE GLUCOSE: 180 — SIGNIFICANT CHANGE UP
GLUCOSE BLDC GLUCOMTR-MCNC: 121 MG/DL — HIGH (ref 70–99)
GLUCOSE BLDC GLUCOMTR-MCNC: 180 MG/DL — HIGH (ref 70–99)
GLUCOSE BLDC GLUCOMTR-MCNC: 218 MG/DL — HIGH (ref 70–99)
GLUCOSE BLDC GLUCOMTR-MCNC: 258 MG/DL — HIGH (ref 70–99)
GLUCOSE SERPL-MCNC: 206 MG/DL — HIGH (ref 70–99)
GLUCOSE SERPL-MCNC: 231 MG/DL — HIGH (ref 70–99)
GRAM STN FLD: SIGNIFICANT CHANGE UP
HCT VFR BLD CALC: 35.9 % — LOW (ref 39–50)
HGB BLD-MCNC: 11.9 G/DL — LOW (ref 13–17)
IANC: 8.98 K/UL — HIGH (ref 1.8–7.4)
IGA FLD-MCNC: 616 MG/DL — HIGH (ref 84–499)
IGG FLD-MCNC: 1942 MG/DL — HIGH (ref 610–1660)
IGM SERPL-MCNC: 50 MG/DL — SIGNIFICANT CHANGE UP (ref 35–242)
IMM GRANULOCYTES NFR BLD AUTO: 0.7 % — SIGNIFICANT CHANGE UP (ref 0–0.9)
KAPPA LC SER QL IFE: 10.49 MG/DL — HIGH (ref 0.33–1.94)
KAPPA LC SER QL IFE: 10.49 MG/DL — HIGH (ref 0.33–1.94)
KAPPA/LAMBDA FREE LIGHT CHAIN RATIO, SERUM: 1.98 RATIO — HIGH (ref 0.26–1.65)
KAPPA/LAMBDA FREE LIGHT CHAIN RATIO, SERUM: 1.98 RATIO — HIGH (ref 0.26–1.65)
LAMBDA LC SER QL IFE: 5.31 MG/DL — HIGH (ref 0.57–2.63)
LAMBDA LC SER QL IFE: 5.31 MG/DL — HIGH (ref 0.57–2.63)
LYMPHOCYTES # BLD AUTO: 2.74 K/UL — SIGNIFICANT CHANGE UP (ref 1–3.3)
LYMPHOCYTES # BLD AUTO: 20.9 % — SIGNIFICANT CHANGE UP (ref 13–44)
MAGNESIUM SERPL-MCNC: 2.1 MG/DL — SIGNIFICANT CHANGE UP (ref 1.6–2.6)
MAGNESIUM SERPL-MCNC: 2.1 MG/DL — SIGNIFICANT CHANGE UP (ref 1.6–2.6)
MCHC RBC-ENTMCNC: 29.9 PG — SIGNIFICANT CHANGE UP (ref 27–34)
MCHC RBC-ENTMCNC: 33.1 GM/DL — SIGNIFICANT CHANGE UP (ref 32–36)
MCV RBC AUTO: 90.2 FL — SIGNIFICANT CHANGE UP (ref 80–100)
METHOD TYPE: SIGNIFICANT CHANGE UP
MONOCYTES # BLD AUTO: 1.11 K/UL — HIGH (ref 0–0.9)
MONOCYTES NFR BLD AUTO: 8.5 % — SIGNIFICANT CHANGE UP (ref 2–14)
NEUTROPHILS # BLD AUTO: 8.98 K/UL — HIGH (ref 1.8–7.4)
NEUTROPHILS NFR BLD AUTO: 68.6 % — SIGNIFICANT CHANGE UP (ref 43–77)
NRBC # BLD: 0 /100 WBCS — SIGNIFICANT CHANGE UP (ref 0–0)
NRBC # FLD: 0 K/UL — SIGNIFICANT CHANGE UP (ref 0–0)
ORGANISM # SPEC MICROSCOPIC CNT: SIGNIFICANT CHANGE UP
ORGANISM # SPEC MICROSCOPIC CNT: SIGNIFICANT CHANGE UP
OSMOLALITY UR: 270 MOSM/KG — SIGNIFICANT CHANGE UP (ref 50–1200)
PHOSPHATE SERPL-MCNC: 4.1 MG/DL — SIGNIFICANT CHANGE UP (ref 2.5–4.5)
PHOSPHATE SERPL-MCNC: 4.6 MG/DL — HIGH (ref 2.5–4.5)
PLATELET # BLD AUTO: 334 K/UL — SIGNIFICANT CHANGE UP (ref 150–400)
POTASSIUM SERPL-MCNC: 4.1 MMOL/L — SIGNIFICANT CHANGE UP (ref 3.5–5.3)
POTASSIUM SERPL-MCNC: 4.6 MMOL/L — SIGNIFICANT CHANGE UP (ref 3.5–5.3)
POTASSIUM SERPL-SCNC: 4.1 MMOL/L — SIGNIFICANT CHANGE UP (ref 3.5–5.3)
POTASSIUM SERPL-SCNC: 4.6 MMOL/L — SIGNIFICANT CHANGE UP (ref 3.5–5.3)
RBC # BLD: 3.98 M/UL — LOW (ref 4.2–5.8)
RBC # FLD: 12.7 % — SIGNIFICANT CHANGE UP (ref 10.3–14.5)
SODIUM SERPL-SCNC: 128 MMOL/L — LOW (ref 135–145)
SODIUM SERPL-SCNC: 129 MMOL/L — LOW (ref 135–145)
SODIUM UR-SCNC: <20 MMOL/L — SIGNIFICANT CHANGE UP
SPECIMEN SOURCE: SIGNIFICANT CHANGE UP
SPECIMEN SOURCE: SIGNIFICANT CHANGE UP
UUN UR-MCNC: 482 MG/DL — SIGNIFICANT CHANGE UP
WBC # BLD: 13.09 K/UL — HIGH (ref 3.8–10.5)
WBC # FLD AUTO: 13.09 K/UL — HIGH (ref 3.8–10.5)

## 2023-08-31 PROCEDURE — 99223 1ST HOSP IP/OBS HIGH 75: CPT | Mod: GC

## 2023-08-31 PROCEDURE — 99233 SBSQ HOSP IP/OBS HIGH 50: CPT

## 2023-08-31 PROCEDURE — 99232 SBSQ HOSP IP/OBS MODERATE 35: CPT

## 2023-08-31 RX ORDER — SODIUM CHLORIDE 9 MG/ML
500 INJECTION, SOLUTION INTRAVENOUS ONCE
Refills: 0 | Status: DISCONTINUED | OUTPATIENT
Start: 2023-08-31 | End: 2023-08-31

## 2023-08-31 RX ORDER — METOPROLOL TARTRATE 50 MG
25 TABLET ORAL EVERY 12 HOURS
Refills: 0 | Status: DISCONTINUED | OUTPATIENT
Start: 2023-08-31 | End: 2023-09-01

## 2023-08-31 RX ORDER — SODIUM CHLORIDE 9 MG/ML
1000 INJECTION, SOLUTION INTRAVENOUS ONCE
Refills: 0 | Status: COMPLETED | OUTPATIENT
Start: 2023-08-31 | End: 2023-08-31

## 2023-08-31 RX ORDER — BUDESONIDE AND FORMOTEROL FUMARATE DIHYDRATE 160; 4.5 UG/1; UG/1
2 AEROSOL RESPIRATORY (INHALATION)
Refills: 0 | Status: DISCONTINUED | OUTPATIENT
Start: 2023-08-31 | End: 2023-09-01

## 2023-08-31 RX ORDER — APIXABAN 2.5 MG/1
2.5 TABLET, FILM COATED ORAL EVERY 12 HOURS
Refills: 0 | Status: DISCONTINUED | OUTPATIENT
Start: 2023-08-31 | End: 2023-09-01

## 2023-08-31 RX ORDER — METOPROLOL TARTRATE 50 MG
5 TABLET ORAL ONCE
Refills: 0 | Status: COMPLETED | OUTPATIENT
Start: 2023-08-31 | End: 2023-08-31

## 2023-08-31 RX ADMIN — Medication 3: at 13:06

## 2023-08-31 RX ADMIN — HEPARIN SODIUM 5000 UNIT(S): 5000 INJECTION INTRAVENOUS; SUBCUTANEOUS at 15:36

## 2023-08-31 RX ADMIN — ATORVASTATIN CALCIUM 10 MILLIGRAM(S): 80 TABLET, FILM COATED ORAL at 21:46

## 2023-08-31 RX ADMIN — POLYETHYLENE GLYCOL 3350 17 GRAM(S): 17 POWDER, FOR SOLUTION ORAL at 17:32

## 2023-08-31 RX ADMIN — AMLODIPINE BESYLATE 2.5 MILLIGRAM(S): 2.5 TABLET ORAL at 05:20

## 2023-08-31 RX ADMIN — POLYETHYLENE GLYCOL 3350 17 GRAM(S): 17 POWDER, FOR SOLUTION ORAL at 05:10

## 2023-08-31 RX ADMIN — MONTELUKAST 10 MILLIGRAM(S): 4 TABLET, CHEWABLE ORAL at 13:05

## 2023-08-31 RX ADMIN — SODIUM CHLORIDE 1000 MILLILITER(S): 9 INJECTION, SOLUTION INTRAVENOUS at 09:53

## 2023-08-31 RX ADMIN — HEPARIN SODIUM 5000 UNIT(S): 5000 INJECTION INTRAVENOUS; SUBCUTANEOUS at 05:10

## 2023-08-31 RX ADMIN — INSULIN GLARGINE 5 UNIT(S): 100 INJECTION, SOLUTION SUBCUTANEOUS at 21:46

## 2023-08-31 RX ADMIN — Medication 100 MILLIGRAM(S): at 01:05

## 2023-08-31 RX ADMIN — Medication 2: at 09:05

## 2023-08-31 RX ADMIN — Medication 200 MILLIGRAM(S): at 05:11

## 2023-08-31 RX ADMIN — Medication 25 MILLIGRAM(S): at 14:46

## 2023-08-31 RX ADMIN — TAMSULOSIN HYDROCHLORIDE 0.4 MILLIGRAM(S): 0.4 CAPSULE ORAL at 21:46

## 2023-08-31 RX ADMIN — Medication 0: at 17:59

## 2023-08-31 RX ADMIN — Medication 5 MILLIGRAM(S): at 09:33

## 2023-08-31 RX ADMIN — LOSARTAN POTASSIUM 100 MILLIGRAM(S): 100 TABLET, FILM COATED ORAL at 05:20

## 2023-08-31 RX ADMIN — BUDESONIDE AND FORMOTEROL FUMARATE DIHYDRATE 2 PUFF(S): 160; 4.5 AEROSOL RESPIRATORY (INHALATION) at 21:46

## 2023-08-31 RX ADMIN — APIXABAN 2.5 MILLIGRAM(S): 2.5 TABLET, FILM COATED ORAL at 17:31

## 2023-08-31 RX ADMIN — SODIUM CHLORIDE 1000 MILLILITER(S): 9 INJECTION, SOLUTION INTRAVENOUS at 08:30

## 2023-08-31 NOTE — PROGRESS NOTE ADULT - SUBJECTIVE AND OBJECTIVE BOX
Abena Prieto MD   Hospital Medicine  Teams preferred  Pager: [phone]    Patient is a 87y old  Male who presents with a chief complaint of NSTEMI, renal failure, and CHF (30 Aug 2023 10:30)      INTERVAL HPI/OVERNIGHT EVENTS: No events overnight. pt able to stool and void on his own. In the Am however, Pt went into Afib with RVR with hypotension with SBPs in the 60s. Pt given 1L LR with improvement of BP to the SBPs 100s. Lopressor 5mg IV pushed with improvement of HR from 150s to 90s. BP soft again to 60s-70s. 1 additional L given. Pt denies any CP, SOB. Denies any palpitations cannot feel Afib. Never had a history of this. No other concerns. BCx grew back coag neg ivette. abx dc    MEDICATIONS  (STANDING):  atorvastatin 10 milliGRAM(s) Oral at bedtime  dextrose 5%. 1000 milliLiter(s) (50 mL/Hr) IV Continuous <Continuous>  dextrose 5%. 1000 milliLiter(s) (100 mL/Hr) IV Continuous <Continuous>  dextrose 50% Injectable 25 Gram(s) IV Push once  dextrose 50% Injectable 25 Gram(s) IV Push once  dextrose 50% Injectable 12.5 Gram(s) IV Push once  glucagon  Injectable 1 milliGRAM(s) IntraMuscular once  heparin   Injectable 5000 Unit(s) SubCutaneous every 8 hours  insulin glargine Injectable (LANTUS) 5 Unit(s) SubCutaneous at bedtime  insulin lispro (ADMELOG) corrective regimen sliding scale   SubCutaneous three times a day before meals  insulin lispro (ADMELOG) corrective regimen sliding scale   SubCutaneous at bedtime  metoprolol tartrate 25 milliGRAM(s) Oral every 12 hours  montelukast 10 milliGRAM(s) Oral daily  polyethylene glycol 3350 17 Gram(s) Oral two times a day  tamsulosin 0.4 milliGRAM(s) Oral at bedtime    MEDICATIONS  (PRN):  acetaminophen     Tablet .. 650 milliGRAM(s) Oral every 6 hours PRN Mild Pain (1 - 3)  albuterol    90 MICROgram(s) HFA Inhaler 2 Puff(s) Inhalation every 6 hours PRN for bronchospasm  dextrose Oral Gel 15 Gram(s) Oral once PRN Blood Glucose LESS THAN 70 milliGRAM(s)/deciliter  melatonin 3 milliGRAM(s) Oral at bedtime PRN Insomnia  simethicone 80 milliGRAM(s) Chew every 6 hours PRN Gas      Allergies    No Known Allergies    Intolerances        REVIEW OF SYSTEMS:  Please see interval HPI:    Vital Signs Last 24 Hrs  T(C): 36.8 (31 Aug 2023 07:12), Max: 37.1 (30 Aug 2023 20:00)  T(F): 98.2 (31 Aug 2023 07:12), Max: 98.8 (30 Aug 2023 20:00)  HR: 137 (31 Aug 2023 08:16) (71 - 156)  BP: 100/82 (31 Aug 2023 09:24) (62/37 - 137/78)  BP(mean): 51 (31 Aug 2023 09:15) (51 - 78)  RR: 22 (31 Aug 2023 09:24) (16 - 22)  SpO2: 96% (31 Aug 2023 09:24) (96% - 99%)    Parameters below as of 31 Aug 2023 09:24  Patient On (Oxygen Delivery Method): room air      I&O's Detail    30 Aug 2023 07:01  -  31 Aug 2023 07:00  --------------------------------------------------------  IN:    IV PiggyBack: 300 mL    Oral Fluid: 420 mL  Total IN: 720 mL    OUT:    Intermittent Catheterization - Urethral (mL): 375 mL    Voided (mL): 100 mL    Voided (mL): 775 mL  Total OUT: 1250 mL    Total NET: -530 mL            PHYSICAL EXAM:  Vital Signs Last 24 Hrs  T(C): 36.8 (31 Aug 2023 07:12), Max: 37.1 (30 Aug 2023 20:00)  T(F): 98.2 (31 Aug 2023 07:12), Max: 98.8 (30 Aug 2023 20:00)  HR: 137 (31 Aug 2023 08:16) (71 - 156)  BP: 100/82 (31 Aug 2023 09:24) (62/37 - 137/78)  BP(mean): 51 (31 Aug 2023 09:15) (51 - 78)  RR: 22 (31 Aug 2023 09:24) (16 - 22)  SpO2: 96% (31 Aug 2023 09:24) (96% - 99%)    Parameters below as of 31 Aug 2023 09:24  Patient On (Oxygen Delivery Method): room air      CONSTITUTIONAL: NAD,   ENMT: Moist oral mucosa,   RESPIRATORY: Normal respiratory effort; mild basilar inspiratory crackles  CARDIOVASCULAR: irregular rhythm, normal S1 and S2, no murmur/rub/gallop; No lower extremity edema;   ABDOMEN: Nontender to palpation, normoactive bowel sounds, no rebound/guarding; No hepatosplenomegaly, abd distension improved  EXT: no edema b/l  NEUROLOGY: alert, following commands  SKIN: No rashes; no palpable lesions      LABS:                        11.9   13.09 )-----------( 334      ( 31 Aug 2023 06:11 )             35.9     31 Aug 2023 06:11    128    |  92     |  34     ----------------------------<  231    4.1     |  23     |  1.55     Ca    8.7        31 Aug 2023 06:11  Phos  4.1       31 Aug 2023 06:11  Mg     2.10      31 Aug 2023 06:11      PT/INR - ( 30 Aug 2023 07:21 )   PT: 13.0 sec;   INR: 1.16 ratio         PTT - ( 30 Aug 2023 07:53 )  PTT:25.3 sec  CAPILLARY BLOOD GLUCOSE      POCT Blood Glucose.: 218 mg/dL (31 Aug 2023 08:33)  POCT Blood Glucose.: 235 mg/dL (30 Aug 2023 21:19)  POCT Blood Glucose.: 149 mg/dL (30 Aug 2023 17:23)  POCT Blood Glucose.: 171 mg/dL (30 Aug 2023 12:57)    BLOOD CULTURE  08-29 @ 23:00   No growth at 24 hours  --  --  08-29 @ 22:50   Growth in aerobic bottle: Gram Positive Cocci in Clusters  Direct identification is available within approximately 3-5  hours either by Blood Panel Multiplexed PCR or Direct  MALDI-TOF. Details: [email or web address]  --  Blood Culture PCR    RADIOLOGY & ADDITIONAL TESTS:  Echo:   CONCLUSIONS:  1. Calcified trileaflet aortic valve with decreased  opening. Peak transaortic valve gradient equals 28 mm Hg,  mean transaortic valve gradient equals 11 mm Hg. DANTE 1 sqcm  by direct planimetry.  2. Normal left ventricular systolic function. No segmental  wall motion abnormalities. GLS - 20.7% (normal).  3. Mild diastolic dysfunction (Stage I).  4. Normal right ventricular size and function.  Consider LANA to evaluate aortic valve more fully if  clinically appropriate.    Imaging Personally Reviewed:  [ ] YES     Consultant(s) Notes Reviewed:      Care Discussed with Consultants/Other Providers:

## 2023-08-31 NOTE — CONSULT NOTE ADULT - SUBJECTIVE AND OBJECTIVE BOX
CHIEF COMPLAINT: Chest pressure for two weeks, CARVAJAL for one week    HPI: 87 M with PMH T2DM (non-insulin dependent), HTN, HLD, and COPD (former smoker), who presented after being sent in by Dr. Black, with intermittent, substernal, nonexertional chest pressure for two weeks. He also notes CARVAJAL for one week unassociated with the chest pressure. He denies any lightheadedness, palpitations, cough, sputum production, or lower extremity swelling. He also endorses a 10-lb unintentional weight loss over the past 6 months associated with decreased appetite.     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  Lives at home with wife  Retired    Allergies  No Known Allergies    HOME MEDICATIONS:  Home Medications:  Albuterol (Eqv-ProAir HFA) 90 mcg/inh inhalation aerosol: 2 puff(s) inhaled 4 times a day as needed for  bronchospasm (30 Aug 2023 03:30)  amLODIPine 2.5 mg oral tablet: 1 tab(s) orally once a day (30 Aug 2023 03:29)  Cozaar 100 mg oral tablet: 1 tab(s) orally once a day (30 Aug 2023 03:29)  glimepiride 2 mg oral tablet: 1 tab(s) orally 2 times a day (30 Aug 2023 03:29)  Jardiance 10 mg oral tablet: 1 tab(s) orally once a day (30 Aug 2023 03:29)  Lipitor 10 mg oral tablet: 1 tab(s) orally once a day (30 Aug 2023 03:29)  metFORMIN 1000 mg oral tablet: 1 tab(s) orally once a day (at bedtime) (30 Aug 2023 03:31)  metFORMIN 500 mg oral tablet: 1 tab(s) orally once a day (30 Aug 2023 03:31)  montelukast 10 mg oral tablet: 1 tab(s) orally once a day (30 Aug 2023 03:31)    REVIEW OF SYSTEMS:  Constitutional: [ ] negative [ ] fevers [ ] chills [X] weight loss [ ] weight gain  HEENT: [X] negative [ ] dry eyes [ ] eye irritation [ ] postnasal drip [ ] nasal congestion  CV: [ ] negative  [X] chest pain [ ] orthopnea [ ] palpitations [ ] murmur  Resp: [ ] negative [ ] cough [ ] shortness of breath [X] dyspnea [ ] wheezing [ ] sputum [ ] hemoptysis  GI: [X] negative [ ] nausea [ ] vomiting [ ] diarrhea [ ] constipation [ ] abd pain [ ] dysphagia   : [ ] negative [X] dysuria [ ] nocturia [ ] hematuria [ ] increased urinary frequency  Musculoskeletal: [X] negative [ ] back pain [ ] myalgias [ ] arthralgias [ ] fracture  Skin: [X] negative [ ] rash [ ] itch  Neurological: [X] negative [ ] headache [ ] dizziness [ ] syncope [ ] weakness [ ] numbness  Psychiatric: [X] negative [ ] anxiety [ ] depression  Endocrine: [ ] negative [X] diabetes [ ] thyroid problem  Hematologic/Lymphatic: [X] negative [ ] anemia [ ] bleeding problem  Allergic/Immunologic: [X] negative [ ] itchy eyes [ ] nasal discharge [ ] hives [ ] angioedema  [X] All other systems negative  [ ] Unable to assess ROS because ________    OBJECTIVE:  Vital Signs Last 24 Hrs  T(C): 36.7 (31 Aug 2023 12:18), Max: 37.1 (30 Aug 2023 20:00)  T(F): 98 (31 Aug 2023 12:18), Max: 98.8 (30 Aug 2023 20:00)  HR: 106 (31 Aug 2023 12:18) (71 - 156)  BP: 107/84 (31 Aug 2023 12:18) (62/37 - 137/78)  BP(mean): 51 (31 Aug 2023 09:15) (51 - 78)  RR: 22 (31 Aug 2023 12:18) (16 - 22)  SpO2: 98% (31 Aug 2023 12:18) (96% - 99%)    O2 Parameters below as of 31 Aug 2023 12:18  Patient On (Oxygen Delivery Method): room air    08-30 @ 07:01  -  08-31 @ 07:00  --------------------------------------------------------  IN: 720 mL / OUT: 1250 mL / NET: -530 mL    08-31 @ 07:01 - 08-31 @ 14:01  --------------------------------------------------------  IN: 240 mL / OUT: 0 mL / NET: 240 mL    CAPILLARY BLOOD GLUCOSE  POCT Blood Glucose.: 258 mg/dL (31 Aug 2023 12:45)    General: NAD, laying in bed  HEENT: PERRLA, EOMI, sclera non-icteric  Neck: JVD absent  Respiratory: Bibasilar crackles, no wheezing, no accessory muscle use  Cardiovascular:  No murmurs/rubs/gallops  Abdomen: Soft, NT, ND  Extremities: No LE edema   Skin: No rashes or lesions   Neurological: Sensation grossly intact, strength 5/5 in all extremities  Psychiatry: [address] MEDICATIONS:  Standing Meds:  atorvastatin 10 milliGRAM(s) Oral at bedtime  dextrose 5%. 1000 milliLiter(s) IV Continuous <Continuous>  dextrose 5%. 1000 milliLiter(s) IV Continuous <Continuous>  dextrose 50% Injectable 12.5 Gram(s) IV Push once  dextrose 50% Injectable 25 Gram(s) IV Push once  dextrose 50% Injectable 25 Gram(s) IV Push once  glucagon  Injectable 1 milliGRAM(s) IntraMuscular once  heparin   Injectable 5000 Unit(s) SubCutaneous every 8 hours  insulin glargine Injectable (LANTUS) 5 Unit(s) SubCutaneous at bedtime  insulin lispro (ADMELOG) corrective regimen sliding scale   SubCutaneous at bedtime  insulin lispro (ADMELOG) corrective regimen sliding scale   SubCutaneous three times a day before meals  metoprolol tartrate 25 milliGRAM(s) Oral every 12 hours  montelukast 10 milliGRAM(s) Oral daily  polyethylene glycol 3350 17 Gram(s) Oral two times a day  tamsulosin 0.4 milliGRAM(s) Oral at bedtime    PRN Meds:  acetaminophen     Tablet .. 650 milliGRAM(s) Oral every 6 hours PRN  albuterol    90 MICROgram(s) HFA Inhaler 2 Puff(s) Inhalation every 6 hours PRN  dextrose Oral Gel 15 Gram(s) Oral once PRN  melatonin 3 milliGRAM(s) Oral at bedtime PRN  simethicone 80 milliGRAM(s) Chew every 6 hours PRN    LABS:                        11.9   13.09 )-----------( 334      ( 31 Aug 2023 06:11 )             35.9     Hgb Trend: 11.9<--, 12.5<--, 12.2<--  08-31    128<L>  |  92<L>  |  34<H>  ----------------------------<  231<H>  4.1   |  23  |  1.55<H>    Ca    8.7      31 Aug 2023 06:11  Phos  4.1     08-31  Mg     2.10     08-31    TPro  8.1  /  Alb  3.5  /  TBili  0.6  /  DBili  x   /  AST  18  /  ALT  17  /  AlkPhos  69  08-30    Creatinine Trend: 1.55<--, 1.47<--, 1.58<--    PT/INR - ( 30 Aug 2023 07:21 )   PT: 13.0 sec;   INR: 1.16 ratio    PTT - ( 30 Aug 2023 07:53 )  PTT:25.3 sec    Urinalysis Basic - ( 31 Aug 2023 06:11 )  Color: x / Appearance: x / SG: x / pH: x  Gluc: 231 mg/dL / Ketone: x  / Bili: x / Urobili: x   Blood: x / Protein: x / Nitrite: x   Leuk Esterase: x / RBC: x / WBC x   Sq Epi: x / Non Sq Epi: x / Bacteria: x    Venous Blood Gas:  08-29 @ 18:39  7.39/38/54/23/84.4  VBG Lactate: 2.2    MICROBIOLOGY:   Culture - Blood (collected 30 Aug 2023 07:35)  Source: .Blood Blood-Peripheral  Preliminary Report (31 Aug 2023 11:01):    No growth at 24 hours    Culture - Blood (collected 30 Aug 2023 07:21)  Source: .Blood Blood-Peripheral  Preliminary Report (31 Aug 2023 11:01):    No growth at 24 hours    Culture - Urine (collected 30 Aug 2023 00:26)  Source: Clean Catch Clean Catch (Midstream)  Final Report (31 Aug 2023 13:54):    >=3 organisms. Probable collection contamination.    Culture - Blood (collected 29 Aug 2023 23:00)  Source: .Blood Blood-Venous  Preliminary Report (31 Aug 2023 03:02):    No growth at 24 hours    Culture - Blood (collected 29 Aug 2023 22:50)  Source: .Blood Blood-Peripheral  Gram Stain (31 Aug 2023 03:14):    Growth in aerobic bottle: Gram Positive Cocci in Clusters  Preliminary Report (31 Aug 2023 03:16):    Growth in aerobic bottle: Gram Positive Cocci in Clusters    Direct identification is available within approximately 3-5    hours either by Blood Panel Multiplexed PCR or Direct    MALDI-TOF. Details: [email or web address]  Organism: Blood Culture PCR (31 Aug 2023 05:52)  Organism: Blood Culture PCR (31 Aug 2023 05:52)

## 2023-08-31 NOTE — PROGRESS NOTE ADULT - PROBLEM SELECTOR PLAN 3
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 could have also been from Afib with RVR  CT chest shows signs of pulm fibrosis  consult pulm today

## 2023-08-31 NOTE — CHART NOTE - NSCHARTNOTEFT_GEN_A_CORE
MEDICINE PA NOTE     Called by RN for patient with new AFIB RVR with -160s and hypotension 60/42 with MAP 40s.   Patient seen by bedside and noted to be awake and alert with minimal complaints. He denies dizziness, lightheadedness or blurry vision however complains of dehydration.   Manual RPT BP noted similar as prior and chart reviewed with normal ECHO with EF 64 and normal LVRVSF.   Will bolus with LR 1L and monitor HR and BP.   IF no improvement will attempt Lopressor for possible tachy induced hypotension.   IF remains in AFIB will discuss for possible AC initiation.     SEBASTIAN RussellC  Department of Medicine/ RCU  In house [facility] [place] [id number]  In house Medicine [facility] [id number]  Reachable via teams MEDICINE PA NOTE     Called by RN for patient with new AFIB RVR with -160s and hypotension 60/42 with MAP 40s.   Patient seen by bedside and noted to be awake and alert with minimal complaints. He denies dizziness, lightheadedness or blurry vision however complains of dehydration.   Manual RPT BP noted similar as prior  Chart reviewed with normal ECHO with EF 64 and normal LVRVSF and hyponatremia, hypochloremia and RUPA.   Urine lytes calculated FeNa 0.6 and concern noted for dehydration induced.   Will bolus with LR 1L and monitor HR and BP.   IF no improvement will attempt Lopressor for possible tachy induced hypotension.   IF remains in AFIB will discuss for possible AC initiation.     Jordy Banuelos PA-C  Department of Medicine/ RCU  In house [facility] [place] [id number]  In house Medicine Bee[place] [id number]  Reachable via teams MEDICINE PA NOTE     Called by RN for patient with new AFIB RVR with -160s and hypotension 60/42 with MAP 40s.   Patient seen by bedside and noted to be awake and alert with minimal complaints. He denies dizziness, lightheadedness or blurry vision however complains of dehydration.   Presented with CARVAJAL and thought to be second to COPD vs new HF given troponin elevated and mildly elevated proNBNP.     Chart reviewed   Normal ECHO with EF 64 and normal LVRVSF   Hyponatremia, hypochloremia and RUPA with FeNa 0.6 and concern noted for dehydration induced post lasix 20mg IVP (8/29 and 8/30)   Manual RPT BP noted similar as prior    Will bolus with LR 1L and monitor HR and BP.   Will send for repeat urine lytes to check FeUREA given lasix doses given.,   IF no improvement will attempt Lopressor for possible tachy induced hypotension.   IF remains in AFIB will discuss for possible AC initiation.     SEBASTIAN RussellC  Department of Medicine/ RCU  In house [facility]CU [place] [id number]  In house Medicine Beeper [id number]  Reachable via teams

## 2023-08-31 NOTE — PROGRESS NOTE ADULT - SUBJECTIVE AND OBJECTIVE BOX
Cardiology Fellow Consult Progress Note    Subjective: patient overall feeling well, denies CP or SOB. No new complaints    No acute events overnight. No significant tele events. ROS negative at the bedside.     This morning, patient developed new AF with RVR, rates in the 140s-150s, blood pressure stable.    REVIEW OF SYSTEMS:  All other review of systems is negative unless indicated above.    Physical Exam:  T(F): 98.2 (-), Max: 98.8 ()  HR: 137 () (71 - 156)  BP: 100/82 () (62/37 - 137/78)  RR: 22 (-)  SpO2: 96% ()  General: NAD  Cardiovascular: Normal S1 S2, No JVD, No murmurs, No edema  Respiratory: Lungs clear to auscultation	  Gastrointestinal:  Soft, Non-tender, + BS	  Skin: warm and dry, No rashes, No ecchymoses, No cyanosis	  Extremities:  No clubbing, cyanosis or edema  Vascular: Peripheral pulses palpable 2+ bilaterally      Cardiovascular diagnostic testings: personally reviewed    Imaging diagnostic testings: personally reviewed    Labs: Personally reviewed                        09 )-----------( 334      ( 31 Aug 2023 06:11 )             35.9         128<L>  |  92<L>  |  34<H>  ----------------------------<  231<H>  4.1   |  23  |  1.55<H>    Ca    8.7      31 Aug 2023 06:11  Phos  4.1       Mg     2.10         TPro  8.1  /  Alb  3.5  /  TBili  0.6  /  DBili  x   /  AST  18  /  ALT  17  /  AlkPhos  69      PT/INR - ( 30 Aug 2023 07:21 )   PT: 13.0 sec;   INR: 1.16 ratio         PTT - ( 30 Aug 2023 07:53 )  PTT:25.3 sec    CARDIAC MARKERS ( 30 Aug 2023 07:21 )  92 ng/L / x     / x     / x     / x     / 4.7 ng/mL  CARDIAC MARKERS ( 29 Aug 2023 20:28 )  106 ng/L / x     / x     / 95 U/L / x     / 5.0 ng/mL  CARDIAC MARKERS ( 29 Aug 2023 18:39 )  98 ng/L / x     / x     / x     / x     / x              Total Cholesterol: 168  LDL: --  HDL: 62  T

## 2023-08-31 NOTE — PROGRESS NOTE ADULT - PROBLEM SELECTOR PLAN 4
-Cr is 1.58, unknown baseline, improving with diuresis  -serum protein/Cr, Urine Na, and RBUS ordered to r/o obstruction   -UPEP/SPEP/serum and urine immunofixation and light chains ordered   -monitor intake and output every 4 hours   -consider renal consult pending above results. -Cr is 1.58, unknown baseline, improving with diuresis  -no longer retaining, c/w BPH meds  -FLC ratio is WNL given renal impairment. There does seem to be a predominance of IgG. 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

## 2023-08-31 NOTE — PROGRESS NOTE ADULT - PROBLEM SELECTOR PLAN 10
Stat ordered for patient sudden hearing loss, please advise patient expecting call back with appointment time and date.    -at home on lipitor 10 mg daily, continue for now

## 2023-08-31 NOTE — CONSULT NOTE ADULT - ATTENDING COMMENTS
Patient with history of COPD, admitted for 2 weeks of intermittent chest pain with dyspnea on exertion, decreased urine output, found to have RUPA, A-fib with RVR.  CT chest with radiographic findings suggestive of UIP, not previously diagnosed.  Patient has chronic dyspnea with exertion, limited within 1 block walking, 1 flight of stairs however overall patient with minimal ambulation.  TTE ED with normal LV and RV function, mild pulmonary hypertension, diastolic dysfunction history of 40-pack-year smoking history, quit in 1980s.  Only uses albuterol as needed at home.  Recommend starting Symbicort, continue albuterol as needed, undiagnosed ILD, can check serologies to evaluate for connective tissue disease.  Patient not hypoxemic or with signs of infection or ILD exacerbation.  Needs follow-up with pulmonary as outpatient.

## 2023-08-31 NOTE — CONSULT NOTE ADULT - NS ATTEND AMEND GEN_ALL_CORE FT
87 year old male with history of DM2, HTN, HLD. COPD admitted with chest pain and SOB found to have NSTEMI, CHF and CKD.  Chest CT demonstrates changes c/w fibrotic interstitial lung disease.   EKG and telemetry on admission demonstrated normal sinus rhythm. However, this morning he went into atrial fibrillation with RVR to 120's, plan for AC and LANA/DCCV.

## 2023-08-31 NOTE — CONSULT NOTE ADULT - CONSULT REASON
atrial fibrillation (new onset)
CT showing pulmonary fibrosis, no known prior hx of this
elevated troponin

## 2023-08-31 NOTE — CONSULT NOTE ADULT - ASSESSMENT
87 M with PMH T2DM (non-insulin dependent), HTN, HLD, and COPD (former smoker), who presented after being sent in by Dr. Black, with intermittent, substernal, nonexertional chest pressure for two weeks with negative ACS workup, presumed demand ischemia. Pulmonology consulted iso CT chest demonstrating fibrotic interstitial lung disease c/f UIP and pulmonary nodules up to 5 mm. No prior imaging available for comparison. The patient does have a history of COPD with significant past smoking history but denies any other pulmonary history. RVP negative. He is saturating well on room air.     Pulmonology Recommendations:   -Concern for fibrotic ILD on imaging, will need outpatient workup, nothing to do inpatient as patient currently stable on room air, would not want to start other COPD inhalers iso new afib with RVR  -Continue home albuterol prn, monitor for HR  -Rest of care per primary team, cardiology  -Upon discharge, can follow-up with clinic outpatient, will need follow-up and possible PFTs; information below:    On the day prior to discharge please email: [email or web address] to setup an appointment for follow up. The appointment should be within 1-2 weeks of discharge from the hospital. Include the patient's name, , MRN and contact information in the email.      Pulmonary/Sleep Clinic  [address] [place] [address]  [phone]    Please discuss the appointment details with the patient and include the appointment details in the patients discharge summary.    87 M with PMH T2DM (non-insulin dependent), HTN, HLD, and COPD (former smoker), who presented after being sent in by Dr. Black, with intermittent, substernal, nonexertional chest pressure for two weeks with negative ACS workup, presumed demand ischemia. Pulmonology consulted iso CT chest demonstrating fibrotic interstitial lung disease c/f UIP and pulmonary nodules up to 5 mm. No prior imaging available for comparison. The patient does have a history of COPD with significant past smoking history but denies any other pulmonary history. RVP negative. He is saturating well on room air.     Pulmonology Recommendations:   -Concern for fibrotic ILD on imaging, will need outpatient workup, nothing to do inpatient as patient currently stable on room air, would not want to start other COPD inhalers iso new afib with RVR  - Please send the following serologies: RF, anti-ccp, anti-centromere, CK, aldolase, MyoMarker Panel 3 (includes anti-MDA5 antibodies), ANCA, Scleroderma Antibodies, NAZ, DsDNA, anti-RO, anti-LA, IgG4, Anti-RNP (order as "SUKUMAR Antibody Screening test"), Nela-1 antibodies, HIV  -Continue home albuterol prn, monitor for HR  -Rest of care per primary team, cardiology  -Upon discharge, can follow-up with clinic outpatient, will need follow-up and possible PFTs; information below:    On the day prior to discharge please email: [email or web address] to setup an appointment for follow up. The appointment should be within 1-2 weeks of discharge from the hospital. Include the patient's name, , MRN and contact information in the email.      Pulmonary/Sleep Clinic  [address] [place] [address]  [phone]    Please discuss the appointment details with the patient and include the appointment details in the patients discharge summary.    87 M with PMH T2DM (non-insulin dependent), HTN, HLD, and COPD (former smoker), who presented after being sent in by Dr. Black, with intermittent, substernal, nonexertional chest pressure for two weeks with negative ACS workup, presumed demand ischemia. Pulmonology consulted iso CT chest demonstrating fibrotic interstitial lung disease c/f UIP and pulmonary nodules up to 5 mm. No prior imaging available for comparison. The patient does have a history of COPD with significant past smoking history but denies any other pulmonary history. RVP negative. He is saturating well on room air.     Pulmonology Recommendations:   -Concern for fibrotic ILD on imaging, will need outpatient workup  - currently stable on room air  - Place start pt on Symbicort 160mcg 2 puffs BID  - Please send the following serologies: RF, anti-ccp, anti-centromere, CK, aldolase, MyoMarker Panel 3 (includes anti-MDA5 antibodies), ANCA, Scleroderma Antibodies, NAZ, DsDNA, anti-RO, anti-LA, IgG4, Anti-RNP (order as "SUKUMAR Antibody Screening test"), Nela-1 antibodies, HIV  -Continue home albuterol prn, monitor for HR  -Rest of care per primary team, cardiology  -Upon discharge, can follow-up with clinic outpatient, will need follow-up and possible PFTs; information below:    On the day prior to discharge please email: [email or web address] to setup an appointment for follow up. The appointment should be within 1-2 weeks of discharge from the hospital. Include the patient's name, , MRN and contact information in the email.      Pulmonary/Sleep Clinic  [address] [place] [address]  [phone]    Please discuss the appointment details with the patient and include the appointment details in the patients discharge summary.

## 2023-08-31 NOTE — CONSULT NOTE ADULT - NS_MD_PANP_GEN_ALL_CORE
Attending and PA/NP shared services statement (NON-critical care):
Attending and PA/NP shared services statement (NON-critical care):
77

## 2023-08-31 NOTE — PROGRESS NOTE ADULT - ASSESSMENT
87 year old M with history of DM2, HTN, HLD. COPD sent from the office of Dr. Duyen Blakc for chest pain.    Likely demand ischemia with mildly elevated but flat enzymes. Low c/f ACS.    TTE showing EF64%, grade 1 DD, calcified aortic valve with peak gradient 28.    On 8/31, developed new AF with RVR, rates in the 140s-150s.    Recommendations:  - Tele  - For new AF, please start AC with Eliquis 5mg BID and rate control with Lorpessor 25mg BID (can uptitrate if BP stable)  - He could benefit from rhythm control eventually (EAST AFNET-4), can follow-up outpatient   - C/w lipitor  - No need to treat for ACS  - Euvolemic on exam, can stop Lasix    Note incomplete until cosigned by attending.    Dima Grace, PGY-4  Cardiology Fellow    For all new consults  [email or web address]  Login: [email or web address]

## 2023-08-31 NOTE — PROGRESS NOTE ADULT - ASSESSMENT
Mr. Liao is a 87 year old M with history of DM2 (non-insulin dependent, HTN, HLD. COPD who presents with progressive chest pressure over the last few weeks. Patient has dyspnea at rest for the last week likely 2/2 to new diagnosis of pulm fibrosis. Stay complicated by Afib with RVR, new onset

## 2023-08-31 NOTE — PROGRESS NOTE ADULT - PROBLEM SELECTOR PLAN 2
new onset  give lopressor 5mg IV x 1 now  give additional 1L LR  start metoprolol 25mg PO BID after BP recovers  discussed with family for AC. will think about it but will likely proceed

## 2023-08-31 NOTE — PROGRESS NOTE ADULT - PROBLEM SELECTOR PLAN 6
-WBC 17 and tenderness to abdomen with palpation  -given benign exam today, hold off on CT abd  -bowel regimen-may need enema  -Blood culturex2 thus far negative  -Will do ciprofloxacin IV BID and Flagyl IV TID->will plan  -UA negative, urine culture -pending   -LA normalized -WBC 17 and tenderness to abdomen with palpation  -given benign exam today, hold off on CT abd  -bowel regimen-may need enema  -Blood culturex2 thus far negative  -Will do ciprofloxacin IV BID and Flagyl IV TID->will dc  -UA negative, urine culture -pending   -LA normalized

## 2023-08-31 NOTE — CONSULT NOTE ADULT - ASSESSMENT
87 year old male with history of DM2, HTN, HLD. COPD sent from Dr. Duyen Black MD office.  Pt presents with chest pain, progressive CARVAJAL x 3-4 months and occasional palpitations associated with increased fatigue. Found to have NSTEMI, CHF and CKD.  Chest CT demonstrates changes c/w fibrotic interstitial lung disease.   EKG and telemetry on admission demonstrated normal sinus rhythm. However, this morning he went into atrial fibrillation with RVR to 120's,  not associated with palpitations or chest pain but with increased shortness of breath with wheezing.   EP called for DCCV.  Agree with DCCV for restoration of sinus rhythm. However, he will need prior LANA to rule out LA/MELANI thrombus as he has a history of intermittent palpitations for several months. Given his current respiratory status he may not be able to tolerate LANA/DCCV tomorrow.   Plan:   NPO after MN for possible LANA/DCCV .   Continue Eliquis for anticoagulation   Continue metoprolol tartrate 25mg bid for rate control.   Awaiting final Bcx results off antibiotics.   Patient aware of the risks, benefits and alternatives to the DCCV and is in agreement to the above plan.

## 2023-08-31 NOTE — CONSULT NOTE ADULT - SUBJECTIVE AND OBJECTIVE BOX
Patient is a 87y old  Male who presents with a chief complaint of NSTEMI, renal failure, and CHF (31 Aug 2023 14:00)  HPI: 87 year old M with history of DM2, HTN, HLD. COPD sent from Dr. Duyen Black MD office.  Pt presents with mild, intermittent chest pain, progressive CARVAJAL x 3-4 months and occasional palpitations associated with increased fatigue.  Chest pain is reported to be substernal, "tight" in quality, mild in intensity, non-radiating, non-exertional and without any associated symptoms. There are no specific alleviating or exacerbating factors.  Duration of chest pain is for hours, occuring few times a week.  There is no increase in severity or frequency.    He states over the last week he has had a decline in his urine output with associated dysuria, which has progressed to not being able to urinate in the last day. EKG and telemetry on admission demonstrated normal sinus rhythm. However, this morning he went into atrial fibrillation with RVR to 120's. Has not been associated with palpitations or chest pain but starting to feel more short of breath with wheezing. No lightheadedness or syncope. No lower extremity edema.         PAST MEDICAL & SURGICAL HISTORY:  DM (diabetes mellitus)  HTN (hypertension)  HLD (hyperlipidemia)  COPD (chronic obstructive pulmonary disease): former smoker      No significant past surgical history    Allergies: NKDA    MEDICATIONS  (STANDING):  atorvastatin 10 milliGRAM(s) Oral at bedtime  dextrose 5%. 1000 milliLiter(s) (100 mL/Hr) IV Continuous <Continuous>  dextrose 5%. 1000 milliLiter(s) (50 mL/Hr) IV Continuous <Continuous>  dextrose 50% Injectable 12.5 Gram(s) IV Push once  dextrose 50% Injectable 25 Gram(s) IV Push once  dextrose 50% Injectable 25 Gram(s) IV Push once  glucagon  Injectable 1 milliGRAM(s) IntraMuscular once  heparin   Injectable 5000 Unit(s) SubCutaneous every 8 hours  insulin glargine Injectable (LANTUS) 5 Unit(s) SubCutaneous at bedtime  insulin lispro (ADMELOG) corrective regimen sliding scale   SubCutaneous three times a day before meals  insulin lispro (ADMELOG) corrective regimen sliding scale   SubCutaneous at bedtime  metoprolol tartrate 25 milliGRAM(s) Oral every 12 hours  montelukast 10 milliGRAM(s) Oral daily  polyethylene glycol 3350 17 Gram(s) Oral two times a day  tamsulosin 0.4 milliGRAM(s) Oral at bedtime    MEDICATIONS  (PRN):  acetaminophen     Tablet .. 650 milliGRAM(s) Oral every 6 hours PRN Mild Pain (1 - 3)  albuterol    90 MICROgram(s) HFA Inhaler 2 Puff(s) Inhalation every 6 hours PRN for bronchospasm  dextrose Oral Gel 15 Gram(s) Oral once PRN Blood Glucose LESS THAN 70 milliGRAM(s)/deciliter  melatonin 3 milliGRAM(s) Oral at bedtime PRN Insomnia  simethicone 80 milliGRAM(s) Chew every 6 hours PRN Gas      FAMILY HISTORY:  No pertinent family history in first degree relatives        SOCIAL HISTORY:    CIGARETTES: former smoker  DRUGS:  no  ALCOHOL: no    REVIEW OF SYSTEMS:    CONSTITUTIONAL: No fever, weight loss, chills, shakes, + fatigue  EYES: No eye pain, visual disturbances, or discharge  ENMT:  No difficulty hearing, tinnitus, vertigo; No sinus or throat pain  NECK: No pain or stiffness  BREASTS: No pain, masses, or nipple discharge  RESPIRATORY:  Dry cough, wheezing and progressive shortness of breath  No hemoptysis  CARDIOVASCULAR: No chest pain, syncope, paroxysmal nocturnal dyspnea, orthopnea, or arm or leg swelling + dyspnea and intermittent palpitations.   GASTROINTESTINAL: No abdominal  or epigastric pain, nausea, vomiting, hematemesis, diarrhea, constipation, melena or bright red blood.  GENITOURINARY: No dysuria, nocturia, hematuria, or urinary incontinence  NEUROLOGICAL: No headaches, memory loss, slurred speech, limb weakness, loss of strength, numbness, or tremors  SKIN: No itching, burning, rashes, or lesions   LYMPH NODES: No enlarged glands  ENDOCRINE: No heat or cold intolerance, or hair loss  MUSCULOSKELETAL: No joint pain or swelling, muscle, back, or extremity pain  PSYCHIATRIC: No depression, anxiety, or difficulty sleeping  HEME/LYMPH: No easy bruising or bleeding gums  ALLERGY AND IMMUNOLOGIC: No hives or rash.      Vital Signs Last 24 Hrs  T(C): 36.9 (31 Aug 2023 15:37), Max: 37.1 (30 Aug 2023 20:00)  T(F): 98.4 (31 Aug 2023 15:37), Max: 98.8 (30 Aug 2023 20:00)  HR: 90 (31 Aug 2023 15:37) (71 - 156)  BP: 97/75 (31 Aug 2023 15:37) (62/37 - 146/123)  BP(mean): 51 (31 Aug 2023 09:15) (51 - 78)  RR: 22 (31 Aug 2023 15:37) (16 - 22)  SpO2: 96% (31 Aug 2023 15:37) (96% - 99%)    Parameters below as of 31 Aug 2023 15:37  Patient On (Oxygen Delivery Method): room air        PHYSICAL EXAM:    GENERAL: In no apparent distress, well nourished, and hydrated.  HEAD:  Atraumatic, Normocephalic  EYES: EOMI, PERRLA, conjunctiva and sclera clear  ENMT: No tonsillar erythema, exudates, or enlargement; Moist mucous membranes, Good dentition, No lesions  NECK: Supple and normal thyroid.  No JVD or carotid bruit.  Carotid pulse is 2+ bilaterally.  HEART: Regular rate and rhythm; No murmurs, rubs, or gallops.  PULMONARY: Clear to auscultation and perfusion.  No rales, wheezing, or rhonchi bilaterally.  ABDOMEN: Soft, Nontender, Nondistended; Bowel sounds present  EXTREMITIES:  2+ Peripheral Pulses, No clubbing, cyanosis, or edema  LYMPH: No lymphadenopathy noted  NEUROLOGICAL: Grossly nonfocal          INTERPRETATION OF TELEMETRY: Atrial fibrillation w/RVR 's     ECG:  Atrial fibrillation w/ bpm. STTW changes in lateral leads. QRSd 82ms. QTc 440ms.       LABS:                        11.9   13.09 )-----------( 334      ( 31 Aug 2023 06:11 )             35.9     08-31    128<L>  |  92<L>  |  34<H>  ----------------------------<  231<H>  4.1   |  23  |  1.55<H>    Ca    8.7      31 Aug 2023 06:11  Phos  4.1     08-31  Mg     2.10     08-31    TPro  8.1  /  Alb  3.5  /  TBili  0.6  /  DBili  x   /  AST  18  /  ALT  17  /  AlkPhos  69  08-30    CARDIAC MARKERS ( 30 Aug 2023 07:21 )  x     / x     / x     / x     / 4.7 ng/mL  CARDIAC MARKERS ( 29 Aug 2023 20:28 )  x     / x     / 95 U/L / x     / 5.0 ng/mL      PT/INR - ( 30 Aug 2023 07:21 )   PT: 13.0 sec;   INR: 1.16 ratio    PTT - ( 30 Aug 2023 07:53 )  PTT:25.3 sec  Urinalysis Basic - ( 31 Aug 2023 06:11 )    Color: x / Appearance: x / SG: x / pH: x  Gluc: 231 mg/dL / Ketone: x  / Bili: x / Urobili: x   Blood: x / Protein: x / Nitrite: x   Leuk Esterase: x / RBC: x / WBC x   Sq Epi: x / Non Sq Epi: x / Bacteria: x      BNP: 1975  Viral panel negative  COVID -19 PCR negative  BCx's negative to lisha  TSH:     RADIOLOGY & ADDITIONAL STUDIES:  PREVIOUS DIAGNOSTIC TESTING:    < from: CT Chest No Cont (08.30.23 @ 11:23) >  ACC: [id number] EXAM:  CT CHEST   ORDERED BY: ABENA COULTER     PROCEDURE DATE:  08/30/2023          INTERPRETATION:  CLINICAL INFORMATION: Abnormal chest x-ray    COMPARISON: Chest x-ray 20/9/2023.    CONTRAST/COMPLICATIONS:  IV Contrast: None  Oral Contrast: None  Complications: None reported    PROCEDURE:  CT scan of the chest was obtained without intravenous contrast.    FINDINGS:    LYMPH NODES: No enlarged thoracic lymph nodes.    HEART/VASCULATURE: Heart size is normal. Small pericardial effusion with   a tiny focus of air (2:77). Coronary artery calcification. Aortic valve   calcification. Calcification of the aorta and its branches. The pulmonary   artery is enlarged.    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 There is a 5 mm nodule in the left upper lobe   on series 2, image 60 as well as a 4 mm nodule in the left lower lobe on   image 77.    UPPER ABDOMEN: Partially imaged bilateral renal cysts. Pancreatic   parenchymal calcifications.    BONES/SOFT TISSUES: Degenerative osseous changes    IMPRESSION:  Fibrotic interstitial lung disease, imaging pattern suggesting UIP.   Pulmonary nodulesmeasuring up to 5 mm, can be reassessed on a follow-up   chest CT in 12 months, in the absence of prior imaging for comparison.    Small pericardial effusion. Tiny focus of pericardial air; correlate with   recent intervention.    --- End of Report ---          LONI OLMOS MD; Resident Radiologist  This document has been electronically signed.  FLAQUITA RAWLS M.D., Attending Radiologist  This document has been electronically signed. Aug 30 2023 12:17PM          ECHO  FINDINGS:  < from: Transthoracic Echocardiogram (08.30.23 @ 14:51) >  Patient name: CARMENZA ROMEO  YOB: 1935   Age: 87 (M)   MR#: [id number]  Study Date: 8/30/2023  Location: [facility] [place] [facility] CASE Sonographer: Bernarda Marroquin [facility]  Study quality: Technically good  Referring Physician: Abena Coulter MD  Blood Pressure: 120/64 mmHg  Height: 160 cm  Weight: 71 kg  BSA: 1.8 m2  ------------------------------------------------------------------------  PROCEDURE: Transthoracic echocardiogram with 2-D, M-Mode  and complete spectral and color flow Doppler.  INDICATION: Cardiomyopathy, unspecified (I42.9)  ------------------------------------------------------------------------  DIMENSIONS:  Dimensions:     Normal Values:  LA:     3.8 cm    2.0 - 4.0 cm  Ao:     3.8 cm    2.0 - 3.8 cm  SEPTUM: 1.0 cm    0.6- 1.2 cm  PWT:    1.0 cm    0.6 - 1.1 cm  LVIDd:  4.0 cm    3.0 - 5.6 cm  LVIDs:  2.4 cm    1.8 - 4.0 cm  Derived Variables:  LVMI: 73 g/m2  RWT: 0.50  Fractional short: 40 %  Ejection Fraction (Modified Foy Rule): 64 %  ------------------------------------------------------------------------  OBSERVATIONS:  Mitral Valve: Mitral annular calcification, otherwise  normal mitral valve. Minimal mitral regurgitation.  Aortic Root: Aortic Root: 3.8 cm.  Sinotubular Junction: 2.9 cm.  Ascending Aorta: 3.6 cm.  Aortic Valve: Calcified trileaflet aortic valve with  decreased opening. Peak transaortic valve gradient equals  28 mm Hg, mean transaortic valve gradient equals 11 mm Hg.  DANTE 1 sqcm by direct planimetry. Mild-moderate aortic  regurgitation.  Left Atrium: Normal left atrium.  LA volume index = 23  cc/m2.  Left Ventricle: Normal left ventricular systolic function.  No segmental wall motion abnormalities. GLS - 20.7%  (normal). Normal left ventricular internal dimensions and  < from: Transthoracic Echocardiogram (08.30.23 @ 14:51) >  wall thicknesses. Mild diastolic dysfunction (Stage I).  Right Heart: Normal right atrium. Normal right ventricular  size and function. Normal tricuspid valve.  Mild tricuspid  regurgitation. Normal pulmonic valve. Minimal pulmonic  regurgitation.  Pericardium/PleuraNormal pericardium with no pericardial  effusion.  Hemodynamic: Estimated right ventricular systolic pressure  equals 44 mm Hg, assuming right atrial pressure equals 10  mm Hg, consistent with mild pulmonary hypertension.  ------------------------------------------------------------------------  CONCLUSIONS:  1. Calcified trileaflet aortic valve with decreased  opening. Peak transaortic valve gradient equals 28 mm Hg,  mean transaortic valve gradient equals 11 mm Hg. DANTE 1 sqcm  by direct planimetry.  2. Normal left ventricular systolic function. No segmental  wall motion abnormalities. GLS - 20.7% (normal).  3. Mild diastolic dysfunction (Stage I).  4. Normal right ventricular size and function.  Consider LANA to evaluate aortic valve more fully if  clinically appropriate.  ------------------------------------------------------------------------  Confirmed on  8/30/2023 - 16:24:57 by Timothy Humphrey M.D.  ------------------------------------------------------------------------                         Patient is a 87y old  Male who presents with a chief complaint of NSTEMI, renal failure, and CHF (31 Aug 2023 14:00)  HPI: 87 year old M with history of DM2, HTN, HLD. COPD sent from Dr. Duyen Balck MD office.  Pt presents with mild, intermittent chest pain, progressive CARVAJAL x 3-4 months and occasional palpitations associated with increased fatigue.  Chest pain is reported to be substernal, "tight" in quality, mild in intensity, non-radiating, non-exertional and without any associated symptoms. There are no specific alleviating or exacerbating factors.  Duration of chest pain is for hours, occuring few times a week. He states over the last week he has had a decline in his urine output with associated dysuria, which has progressed to not being able to urinate in the last day. EKG and telemetry on admission demonstrated normal sinus rhythm. However, this morning he went into atrial fibrillation with RVR to 120's,  not associated with palpitations or chest pain but starting to feel more short of breath with wheezing. No lightheadedness or syncope. No lower extremity edema.   EP called for DCCV.        PAST MEDICAL & SURGICAL HISTORY:  DM (diabetes mellitus)  HTN (hypertension)  HLD (hyperlipidemia)  COPD (chronic obstructive pulmonary disease): former smoker      No significant past surgical history    Allergies: NKDA    MEDICATIONS  (STANDING):  atorvastatin 10 milliGRAM(s) Oral at bedtime  dextrose 5%. 1000 milliLiter(s) (100 mL/Hr) IV Continuous <Continuous>  dextrose 5%. 1000 milliLiter(s) (50 mL/Hr) IV Continuous <Continuous>  dextrose 50% Injectable 12.5 Gram(s) IV Push once  dextrose 50% Injectable 25 Gram(s) IV Push once  dextrose 50% Injectable 25 Gram(s) IV Push once  glucagon  Injectable 1 milliGRAM(s) IntraMuscular once  heparin   Injectable 5000 Unit(s) SubCutaneous every 8 hours  insulin glargine Injectable (LANTUS) 5 Unit(s) SubCutaneous at bedtime  insulin lispro (ADMELOG) corrective regimen sliding scale   SubCutaneous three times a day before meals  insulin lispro (ADMELOG) corrective regimen sliding scale   SubCutaneous at bedtime  metoprolol tartrate 25 milliGRAM(s) Oral every 12 hours  montelukast 10 milliGRAM(s) Oral daily  polyethylene glycol 3350 17 Gram(s) Oral two times a day  tamsulosin 0.4 milliGRAM(s) Oral at bedtime    MEDICATIONS  (PRN):  acetaminophen     Tablet .. 650 milliGRAM(s) Oral every 6 hours PRN Mild Pain (1 - 3)  albuterol    90 MICROgram(s) HFA Inhaler 2 Puff(s) Inhalation every 6 hours PRN for bronchospasm  dextrose Oral Gel 15 Gram(s) Oral once PRN Blood Glucose LESS THAN 70 milliGRAM(s)/deciliter  melatonin 3 milliGRAM(s) Oral at bedtime PRN Insomnia  simethicone 80 milliGRAM(s) Chew every 6 hours PRN Gas      FAMILY HISTORY:  No pertinent family history in first degree relatives        SOCIAL HISTORY:    CIGARETTES: former smoker  DRUGS:  no  ALCOHOL: no    REVIEW OF SYSTEMS:    CONSTITUTIONAL: No fever, weight loss, chills, shakes, + fatigue  EYES: No eye pain, visual disturbances, or discharge  ENMT:  No difficulty hearing, tinnitus, vertigo; No sinus or throat pain  NECK: No pain or stiffness  BREASTS: No pain, masses, or nipple discharge  RESPIRATORY:  Dry cough, wheezing and progressive shortness of breath  No hemoptysis  CARDIOVASCULAR: + chest pain, No paroxysmal nocturnal dyspnea, orthopnea, or arm or leg swelling + dyspnea and intermittent palpitations.   GASTROINTESTINAL: No abdominal  or epigastric pain, nausea, vomiting, hematemesis, diarrhea, constipation, melena or bright red blood.  GENITOURINARY: +dysuria with decreased urinary output  NEUROLOGICAL: No headaches, memory loss, slurred speech, limb weakness, loss of strength, numbness, or tremors  SKIN: No itching, burning, rashes, or lesions   LYMPH NODES: No enlarged glands  ENDOCRINE: No heat or cold intolerance, or hair loss  MUSCULOSKELETAL: Hx of bilateral knee pain 2/2 OA. No swelling. No back pain  PSYCHIATRIC: No depression, anxiety, or difficulty sleeping  HEME/LYMPH: No easy bruising or bleeding gums  ALLERGY AND IMMUNOLOGIC: No hives or rash.      Vital Signs Last 24 Hrs  T(C): 36.9 (31 Aug 2023 15:37), Max: 37.1 (30 Aug 2023 20:00)  T(F): 98.4 (31 Aug 2023 15:37), Max: 98.8 (30 Aug 2023 20:00)  HR: 90 (31 Aug 2023 15:37) (71 - 156)  BP: 97/75 (31 Aug 2023 15:37) (62/37 - 146/123)  BP(mean): 51 (31 Aug 2023 09:15) (51 - 78)  RR: 22 (31 Aug 2023 15:37) (16 - 22)  SpO2: 96% (31 Aug 2023 15:37) (96% - 99%)    Parameters below as of 31 Aug 2023 15:37  Patient On (Oxygen Delivery Method): room air        PHYSICAL EXAM:    GENERAL: Patient unable to lay flat in bed. Currently SOB at rest  HEAD:  Atraumatic, Normocephalic  EYES: EOMI, PERRLA, conjunctiva and sclera clear  ENMT: No tonsillar erythema, exudates, or enlargement; Moist mucous membranes +upper dentures. No loose teeth  NECK: Supple and normal thyroid.  No JVD or carotid bruit.  Carotid pulse is 2+ bilaterally.  HEART: Irregular rate and rhythm; No murmurs, rubs, or gallops.  PULMONARY: decreased breath sounds with diffuse crackles. + wheezing,  ABDOMEN: Soft, Nontender, +distended; Bowel sounds present  EXTREMITIES:  2+ Peripheral Pulses, No clubbing, cyanosis, or edema  LYMPH: No lymphadenopathy noted  NEUROLOGICAL: Grossly nonfocal    INTERPRETATION OF TELEMETRY: Atrial fibrillation w/RVR 's  ECG:  Atrial fibrillation w/ bpm. STTW changes in lateral leads. QRSd 82ms. QTc 440ms.       LABS:                        11.9   13.09 )-----------( 334      ( 31 Aug 2023 06:11 )             35.9     08-31    128<L>  |  92<L>  |  34<H>  ----------------------------<  231<H>  4.1   |  23  |  1.55<H>    Ca    8.7      31 Aug 2023 06:11  Phos  4.1     08-31  Mg     2.10     08-31    TPro  8.1  /  Alb  3.5  /  TBili  0.6  /  DBili  x   /  AST  18  /  ALT  17  /  AlkPhos  69  08-30    CARDIAC MARKERS ( 30 Aug 2023 07:21 )  x     / x     / x     / x     / 4.7 ng/mL  CARDIAC MARKERS ( 29 Aug 2023 20:28 )  x     / x     / 95 U/L / x     / 5.0 ng/mL      PT/INR - ( 30 Aug 2023 07:21 )   PT: 13.0 sec;   INR: 1.16 ratio    PTT - ( 30 Aug 2023 07:53 )  PTT:25.3 sec  Urinalysis Basic - ( 31 Aug 2023 06:11 )    Color: x / Appearance: x / SG: x / pH: x  Gluc: 231 mg/dL / Ketone: x  / Bili: x / Urobili: x   Blood: x / Protein: x / Nitrite: x   Leuk Esterase: x / RBC: x / WBC x   Sq Epi: x / Non Sq Epi: x / Bacteria: x      BNP: 1975  Viral panel negative  COVID -19 PCR negative  BCx's negative to lisha  TSH: pending    RADIOLOGY & ADDITIONAL STUDIES:  PREVIOUS DIAGNOSTIC TESTING:    < from: CT Chest No Cont (08.30.23 @ 11:23) >  ACC: [id number] EXAM:  CT CHEST   ORDERED BY: ABENA COULTER     PROCEDURE DATE:  08/30/2023    INTERPRETATION:  CLINICAL INFORMATION: Abnormal chest x-ray    COMPARISON: Chest x-ray 20/9/2023.    CONTRAST/COMPLICATIONS:  IV Contrast: None  Oral Contrast: None  Complications: None reported    PROCEDURE:  CT scan of the chest was obtained without intravenous contrast.    FINDINGS:    LYMPH NODES: No enlarged thoracic lymph nodes.    HEART/VASCULATURE: Heart size is normal. Small pericardial effusion with   a tiny focus of air (2:77). Coronary artery calcification. Aortic valve   calcification. Calcification of the aorta and its branches. The pulmonary   artery is enlarged.    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 There is a 5 mm nodule in the left upper lobe   on series 2, image 60 as well as a 4 mm nodule in the left lower lobe on   image 77.    UPPER ABDOMEN: Partially imaged bilateral renal cysts. Pancreatic   parenchymal calcifications.    BONES/SOFT TISSUES: Degenerative osseous changes    IMPRESSION:  Fibrotic interstitial lung disease, imaging pattern suggesting UIP.   Pulmonary nodules measuring up to 5 mm, can be reassessed on a follow-up   chest CT in 12 months, in the absence of prior imaging for comparison.    Small pericardial effusion. Tiny focus of pericardial air; correlate with   recent intervention.    --- End of Report ---  LONI OLMOS MD; Resident Radiologist  This document has been electronically signed.  FLAQUITA RAWLS M.D., Attending Radiologist  This document has been electronically signed. Aug 30 2023 12:17PM          ECHO  FINDINGS:  < from: Transthoracic Echocardiogram (08.30.23 @ 14:51) >  Patient name: CARMENZA ROMEO  YOB: 1935   Age: 87 (M)   MR#: [id number]  Study Date: 8/30/2023  Location: [facility] [place] [facility] CASE Sonographer: Bernarda Marroquin [facility]  Study quality: Technically good  Referring Physician: Abena Coulter MD  Blood Pressure: 120/64 mmHg  Height: 160 cm  Weight: 71 kg  BSA: 1.8 m2  ------------------------------------------------------------------------  PROCEDURE: Transthoracic echocardiogram with 2-D, M-Mode  and complete spectral and color flow Doppler.  INDICATION: Cardiomyopathy, unspecified (I42.9)  ------------------------------------------------------------------------  DIMENSIONS:  Dimensions:     Normal Values:  LA:     3.8 cm    2.0 - 4.0 cm  Ao:     3.8 cm    2.0 - 3.8 cm  SEPTUM: 1.0 cm    0.6- 1.2 cm  PWT:    1.0 cm    0.6 - 1.1 cm  LVIDd:  4.0 cm    3.0 - 5.6 cm  LVIDs:  2.4 cm    1.8 - 4.0 cm  Derived Variables:  LVMI: 73 g/m2  RWT: 0.50  Fractional short: 40 %  Ejection Fraction (Modified Foy Rule): 64 %  ------------------------------------------------------------------------  OBSERVATIONS:  Mitral Valve: Mitral annular calcification, otherwise  normal mitral valve. Minimal mitral regurgitation.  Aortic Root: Aortic Root: 3.8 cm.  Sinotubular Junction: 2.9 cm.  Ascending Aorta: 3.6 cm.  Aortic Valve: Calcified trileaflet aortic valve with  decreased opening. Peak transaortic valve gradient equals  28 mm Hg, mean transaortic valve gradient equals 11 mm Hg.  DANTE 1 sqcm by direct planimetry. Mild-moderate aortic  regurgitation.  Left Atrium: Normal left atrium.  LA volume index = 23  cc/m2.  Left Ventricle: Normal left ventricular systolic function.  No segmental wall motion abnormalities. GLS - 20.7%  (normal). Normal left ventricular internal dimensions and  < from: Transthoracic Echocardiogram (08.30.23 @ 14:51) >  wall thicknesses. Mild diastolic dysfunction (Stage I).  Right Heart: Normal right atrium. Normal right ventricular  size and function. Normal tricuspid valve.  Mild tricuspid  regurgitation. Normal pulmonic valve. Minimal pulmonic  regurgitation.  Pericardium/PleuraNormal pericardium with no pericardial  effusion.  Hemodynamic: Estimated right ventricular systolic pressure  equals 44 mm Hg, assuming right atrial pressure equals 10  mm Hg, consistent with mild pulmonary hypertension.  ------------------------------------------------------------------------  CONCLUSIONS:  1. Calcified trileaflet aortic valve with decreased  opening. Peak transaortic valve gradient equals 28 mm Hg,  mean transaortic valve gradient equals 11 mm Hg. DANTE 1 sqcm  by direct planimetry.  2. Normal left ventricular systolic function. No segmental  wall motion abnormalities. GLS - 20.7% (normal).  3. Mild diastolic dysfunction (Stage I).  4. Normal right ventricular size and function.  Consider LANA to evaluate aortic valve more fully if  clinically appropriate.  ------------------------------------------------------------------------  Confirmed on  8/30/2023 - 16:24:57 by Timothy Humphrey M.D.  ------------------------------------------------------------------------

## 2023-09-01 ENCOUNTER — TRANSCRIPTION ENCOUNTER (OUTPATIENT)
Age: 88
End: 2023-09-01

## 2023-09-01 VITALS — DIASTOLIC BLOOD PRESSURE: 83 MMHG | HEART RATE: 65 BPM | SYSTOLIC BLOOD PRESSURE: 142 MMHG

## 2023-09-01 DIAGNOSIS — E87.1 HYPO-OSMOLALITY AND HYPONATREMIA: ICD-10-CM

## 2023-09-01 DIAGNOSIS — J84.10 PULMONARY FIBROSIS, UNSPECIFIED: ICD-10-CM

## 2023-09-01 PROBLEM — Z00.00 ENCOUNTER FOR PREVENTIVE HEALTH EXAMINATION: Status: ACTIVE | Noted: 2023-09-01

## 2023-09-01 PROBLEM — I10 ESSENTIAL (PRIMARY) HYPERTENSION: Chronic | Status: ACTIVE | Noted: 2023-08-29

## 2023-09-01 PROBLEM — E78.5 HYPERLIPIDEMIA, UNSPECIFIED: Chronic | Status: ACTIVE | Noted: 2023-08-29

## 2023-09-01 PROBLEM — Z87.891 PERSONAL HISTORY OF NICOTINE DEPENDENCE: Chronic | Status: ACTIVE | Noted: 2023-08-30

## 2023-09-01 PROBLEM — J44.9 CHRONIC OBSTRUCTIVE PULMONARY DISEASE, UNSPECIFIED: Chronic | Status: ACTIVE | Noted: 2023-08-30

## 2023-09-01 LAB
ANION GAP SERPL CALC-SCNC: 12 MMOL/L — SIGNIFICANT CHANGE UP (ref 7–14)
ANTI-RIBONUCLEAR PROTEIN: <0.2 AI — SIGNIFICANT CHANGE UP
AUTO DIFF PNL BLD: ABNORMAL
BASOPHILS # BLD AUTO: 0.04 K/UL — SIGNIFICANT CHANGE UP (ref 0–0.2)
BASOPHILS NFR BLD AUTO: 0.3 % — SIGNIFICANT CHANGE UP (ref 0–2)
BUN SERPL-MCNC: 30 MG/DL — HIGH (ref 7–23)
C-ANCA SER-ACNC: NEGATIVE — SIGNIFICANT CHANGE UP
CALCIUM SERPL-MCNC: 9.1 MG/DL — SIGNIFICANT CHANGE UP (ref 8.4–10.5)
CENTROMERE AB SER-ACNC: <0.2 AI — SIGNIFICANT CHANGE UP
CHLORIDE SERPL-SCNC: 97 MMOL/L — LOW (ref 98–107)
CK SERPL-CCNC: 111 U/L — SIGNIFICANT CHANGE UP (ref 30–200)
CO2 SERPL-SCNC: 26 MMOL/L — SIGNIFICANT CHANGE UP (ref 22–31)
CREAT SERPL-MCNC: 1.24 MG/DL — SIGNIFICANT CHANGE UP (ref 0.5–1.3)
DSDNA AB FLD-ACNC: <0.2 AI — SIGNIFICANT CHANGE UP
EGFR: 56 ML/MIN/1.73M2 — LOW
ENA JO1 AB SER-ACNC: <0.2 AI — SIGNIFICANT CHANGE UP
ENA SCL70 AB SER-ACNC: <0.2 AI — SIGNIFICANT CHANGE UP
ENA SM AB FLD QL: <0.2 AI — SIGNIFICANT CHANGE UP
ENA SS-A AB FLD IA-ACNC: <0.2 AI — SIGNIFICANT CHANGE UP
EOSINOPHIL # BLD AUTO: 0.09 K/UL — SIGNIFICANT CHANGE UP (ref 0–0.5)
EOSINOPHIL NFR BLD AUTO: 0.7 % — SIGNIFICANT CHANGE UP (ref 0–6)
GLUCOSE BLDC GLUCOMTR-MCNC: 131 MG/DL — HIGH (ref 70–99)
GLUCOSE BLDC GLUCOMTR-MCNC: 187 MG/DL — HIGH (ref 70–99)
GLUCOSE SERPL-MCNC: 135 MG/DL — HIGH (ref 70–99)
HCT VFR BLD CALC: 36.2 % — LOW (ref 39–50)
HGB BLD-MCNC: 11.8 G/DL — LOW (ref 13–17)
HIV 1+2 AB+HIV1 P24 AG SERPL QL IA: SIGNIFICANT CHANGE UP
IANC: 9.28 K/UL — HIGH (ref 1.8–7.4)
IMM GRANULOCYTES NFR BLD AUTO: 1 % — HIGH (ref 0–0.9)
LYMPHOCYTES # BLD AUTO: 17 % — SIGNIFICANT CHANGE UP (ref 13–44)
LYMPHOCYTES # BLD AUTO: 2.15 K/UL — SIGNIFICANT CHANGE UP (ref 1–3.3)
MAGNESIUM SERPL-MCNC: 2.3 MG/DL — SIGNIFICANT CHANGE UP (ref 1.6–2.6)
MCHC RBC-ENTMCNC: 29.5 PG — SIGNIFICANT CHANGE UP (ref 27–34)
MCHC RBC-ENTMCNC: 32.6 GM/DL — SIGNIFICANT CHANGE UP (ref 32–36)
MCV RBC AUTO: 90.5 FL — SIGNIFICANT CHANGE UP (ref 80–100)
MONOCYTES # BLD AUTO: 0.93 K/UL — HIGH (ref 0–0.9)
MONOCYTES NFR BLD AUTO: 7.4 % — SIGNIFICANT CHANGE UP (ref 2–14)
NEUTROPHILS # BLD AUTO: 9.28 K/UL — HIGH (ref 1.8–7.4)
NEUTROPHILS NFR BLD AUTO: 73.6 % — SIGNIFICANT CHANGE UP (ref 43–77)
NRBC # BLD: 0 /100 WBCS — SIGNIFICANT CHANGE UP (ref 0–0)
NRBC # FLD: 0 K/UL — SIGNIFICANT CHANGE UP (ref 0–0)
P-ANCA SER-ACNC: NEGATIVE — SIGNIFICANT CHANGE UP
PHOSPHATE SERPL-MCNC: 4.3 MG/DL — SIGNIFICANT CHANGE UP (ref 2.5–4.5)
PLATELET # BLD AUTO: 347 K/UL — SIGNIFICANT CHANGE UP (ref 150–400)
POTASSIUM SERPL-MCNC: 4.5 MMOL/L — SIGNIFICANT CHANGE UP (ref 3.5–5.3)
POTASSIUM SERPL-SCNC: 4.5 MMOL/L — SIGNIFICANT CHANGE UP (ref 3.5–5.3)
RBC # BLD: 4 M/UL — LOW (ref 4.2–5.8)
RBC # FLD: 12.8 % — SIGNIFICANT CHANGE UP (ref 10.3–14.5)
RHEUMATOID FACT SERPL-ACNC: <10 IU/ML — SIGNIFICANT CHANGE UP (ref 0–13)
SODIUM SERPL-SCNC: 135 MMOL/L — SIGNIFICANT CHANGE UP (ref 135–145)
WBC # BLD: 12.62 K/UL — HIGH (ref 3.8–10.5)
WBC # FLD AUTO: 12.62 K/UL — HIGH (ref 3.8–10.5)

## 2023-09-01 PROCEDURE — 99232 SBSQ HOSP IP/OBS MODERATE 35: CPT | Mod: GC

## 2023-09-01 PROCEDURE — 93016 CV STRESS TEST SUPVJ ONLY: CPT | Mod: 59,GC

## 2023-09-01 PROCEDURE — 78452 HT MUSCLE IMAGE SPECT MULT: CPT | Mod: 26

## 2023-09-01 PROCEDURE — 99232 SBSQ HOSP IP/OBS MODERATE 35: CPT

## 2023-09-01 PROCEDURE — 99239 HOSP IP/OBS DSCHRG MGMT >30: CPT

## 2023-09-01 PROCEDURE — 93018 CV STRESS TEST I&R ONLY: CPT | Mod: 59,GC

## 2023-09-01 RX ORDER — METOPROLOL TARTRATE 50 MG
1 TABLET ORAL
Qty: 60 | Refills: 0
Start: 2023-09-01 | End: 2023-09-30

## 2023-09-01 RX ORDER — BUDESONIDE AND FORMOTEROL FUMARATE DIHYDRATE 160; 4.5 UG/1; UG/1
2 AEROSOL RESPIRATORY (INHALATION)
Qty: 1 | Refills: 0
Start: 2023-09-01

## 2023-09-01 RX ORDER — MONTELUKAST 4 MG/1
1 TABLET, CHEWABLE ORAL
Qty: 0 | Refills: 0 | DISCHARGE
Start: 2023-09-01

## 2023-09-01 RX ORDER — ALBUTEROL 90 UG/1
2 AEROSOL, METERED ORAL
Qty: 0 | Refills: 0 | DISCHARGE
Start: 2023-09-01

## 2023-09-01 RX ORDER — AMLODIPINE BESYLATE 2.5 MG/1
1 TABLET ORAL
Refills: 0 | DISCHARGE

## 2023-09-01 RX ORDER — METFORMIN HYDROCHLORIDE 850 MG/1
1 TABLET ORAL
Refills: 0 | DISCHARGE

## 2023-09-01 RX ORDER — APIXABAN 2.5 MG/1
1 TABLET, FILM COATED ORAL
Qty: 60 | Refills: 0
Start: 2023-09-01 | End: 2023-09-30

## 2023-09-01 RX ORDER — MONTELUKAST 4 MG/1
1 TABLET, CHEWABLE ORAL
Refills: 0 | DISCHARGE

## 2023-09-01 RX ORDER — ATORVASTATIN CALCIUM 80 MG/1
1 TABLET, FILM COATED ORAL
Qty: 0 | Refills: 0 | DISCHARGE
Start: 2023-09-01

## 2023-09-01 RX ORDER — ALBUTEROL 90 UG/1
2 AEROSOL, METERED ORAL
Refills: 0 | DISCHARGE

## 2023-09-01 RX ORDER — SODIUM CHLORIDE 9 MG/ML
1000 INJECTION, SOLUTION INTRAVENOUS
Refills: 0 | Status: DISCONTINUED | OUTPATIENT
Start: 2023-09-01 | End: 2023-09-01

## 2023-09-01 RX ORDER — TAMSULOSIN HYDROCHLORIDE 0.4 MG/1
1 CAPSULE ORAL
Qty: 0 | Refills: 0 | DISCHARGE
Start: 2023-09-01

## 2023-09-01 RX ORDER — ATORVASTATIN CALCIUM 80 MG/1
1 TABLET, FILM COATED ORAL
Refills: 0 | DISCHARGE

## 2023-09-01 RX ADMIN — APIXABAN 2.5 MILLIGRAM(S): 2.5 TABLET, FILM COATED ORAL at 05:11

## 2023-09-01 RX ADMIN — Medication 25 MILLIGRAM(S): at 02:34

## 2023-09-01 RX ADMIN — APIXABAN 2.5 MILLIGRAM(S): 2.5 TABLET, FILM COATED ORAL at 19:11

## 2023-09-01 NOTE — DIETITIAN INITIAL EVALUATION ADULT - PERSON TAUGHT/METHOD
Pt amenable to provision of written type 2 DM nutrition education, RDN contact information left at bedside. Topics include: MyPlate healthy eating guidelines, avoidance of sugar sweetened beverages/recommended alternatives, label reading, sources of carbohydrates, inclusion of whole grains/fiber, mixed meals (pairing protein with carbohydrate for optimal blood glucose response). Pt amenable to provision of written type 2 DM nutrition education, RDN contact information left at bedside. Topics include: MyPlate healthy eating guidelines, avoidance of sugar sweetened beverages/recommended alternatives, label reading, sources of carbohydrates, inclusion of whole grains/fiber, mixed meals (pairing protein with carbohydrate for optimal blood glucose response).    Pt's wife and son available at later visit, reviewed nutrition education at bedside and addressed all concerns as able.

## 2023-09-01 NOTE — PROGRESS NOTE ADULT - PROBLEM SELECTOR PLAN 2
new onset now back in sinus  c/w  metoprolol 25mg PO BID after BP recovers  c/w Eliquis 2.5mg BIG Discussed benefits/importance of oral nutrition supplements/(1) partially meets; needs review/practice/verbalization new onset now back in sinus  c/w  metoprolol 25mg PO BID   c/w Eliquis 2.5mg BIG

## 2023-09-01 NOTE — PROGRESS NOTE ADULT - ASSESSMENT
87 M with PMH T2DM (non-insulin dependent), HTN, HLD, and COPD (former smoker), who presented after being sent in by Dr. Black, with intermittent, substernal, nonexertional chest pressure for two weeks with negative ACS workup, presumed demand ischemia. Pulmonology consulted iso CT chest demonstrating fibrotic interstitial lung disease c/f UIP and pulmonary nodules up to 5 mm. No prior imaging available for comparison. The patient does have a history of COPD with significant past smoking history but denies any other pulmonary history. RVP negative. He is saturating well on room air.     Pulmonology Recommendations:   -Concern for fibrotic ILD with nodules on imaging, will need outpatient workup and repeat imaging in the future  - currently stable on room air  -  Symbicort 160mcg 2 puffs BID  - f/up serologies: RF, anti-ccp, anti-centromere, CK, aldolase, MyoMarker Panel 3 (includes anti-MDA5 antibodies), ANCA, Scleroderma Antibodies, NAZ, DsDNA, anti-RO, anti-LA, IgG4, Anti-RNP (order as "SUKUMAR Antibody Screening test"), Nela-1 antibodies, HIV  -Continue home albuterol prn, monitor for HR  -Rest of care per primary team, cardiology  -Upon discharge, can follow-up with clinic outpatient, will need follow-up and possible PFTs; information below:    On the day prior to discharge please email: [email or web address] to setup an appointment for follow up. The appointment should be within 1-2 weeks of discharge from the hospital. Include the patient's name, , MRN and contact information in the email.      [facility]  [address] [place] [address]  [phone]    Please discuss the appointment details with the patient and include the appointment details in the patients discharge summary.     Jorge Moody MD  [facility]

## 2023-09-01 NOTE — PROGRESS NOTE ADULT - PROBLEM SELECTOR PLAN 6
-WBC 17 and tenderness to abdomen with palpation  -given benign exam today, hold off on CT abd  -bowel regimen-may need enema  -Blood culturex2, 1 is coag neg staph, the other is negative  -s/p cipro and flagyl-d/c after 24 hrs  -UA negative, urine culture -pending   -LA normalized -Cr improved today s/p fluid resuscitation   -no longer retaining, c/w BPH meds  -FLC ratio is WNL given renal impairment. There does seem to be a predominance of IgG. 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

## 2023-09-01 NOTE — DIETITIAN INITIAL EVALUATION ADULT - PROBLEM SELECTOR PROBLEM 4
[facility]    [address]    [place] [address]    Phone:  [phone]       Thank You for choosing us for your health care visit. We are glad to serve you and happy to provide you with this summary of your visit. Please help us to ensure we have accurate records. If you find anything that needs to be changed, please let our staff know as soon as possible.          Your Demographic Information     Patient Name Sex Chiara Gray Female 1952       Ethnic Group Patient Race    Not of  or  Origin Black/      Your Visit Details     Date & Time Provider Department    2017 8:00 AM Bart Knox MD [facility]      Your Upcoming Appointment*(Max 10)     2017 11:00 AM CDT   Lab Visit with [id number]   [facility] ([address]    [address]  [facility] [address] [phone]            2017 11:15 AM CDT   Follow-up Visit with Vidal Melara MD   [facility] ([address]    [address] [address] [phone]              Conditions Discussed Today or Order-Related Diagnoses        Comments    HSV-1 infection    -  Primary     Cellulitis of face           Your Vitals Were     BP Pulse Temp Resp Weight SpO2    118/80 (BP Location: E, Patient Position: Sitting) 78 98.3 °F (36.8 °C) (Oral) 12 206 lb (93.4 kg) 100%    BMI Smoking Status                32.26 kg/m2 Former Smoker          Medications Prescribed or Re-Ordered Today     Valacyclovir HCl (VALTREX) 1000 MG Tab    Sig - Route: Take 2 tablets by mouth 2 times daily for 1 day. - Oral    Class: Eprescribe    Pharmacy: [facility]/pharmacy #[id number] - [place], [place] - [address] [facility] #: [phone]    cefadroxil (DURICEF) 500 MG capsule    Sig - Route: Take 1 capsule by mouth 2 times daily for 10 days. -  Leukocytosis Oral    Class: Eprescribe    Pharmacy: [facility]/pharmacy #[address] [place] [address] [address] [facility] [address] #: [phone]    fluconazole (DIFLUCAN) 150 MG tablet    Sig - Route: Take 1 tablet by mouth once for 1 dose. - Oral    Class: Eprescribe    Pharmacy: [facility]/pharmacy #[address] [place] [address] [address] [facility] Ph #: [phone]      Your Current Medications Are        Disp Refills Start End    triamterene-hydrochlorothiazide (MAXZIDE-25) 37.5-25 MG per tablet 90 tablet 1 1/30/2017     Sig: TAKE 1 TABLET BY MOUTH DAILY.    Class: Eprescribe    rOPINIRole (REQUIP) 0.5 MG tablet 30 tablet 6 6/30/2016     Sig - Route: Take 1 tablet by mouth nightly. - Oral    Class: Eprescribe    VITAMIN D, CHOLECALCIFEROL, PO        Class: Historical Med    Route: Oral    hydrOXYzine (ATARAX) 10 MG tablet 60 tablet 5 6/21/2016     Sig: TAKE 1 TABLET BY MOUTH 3 TIMES A DAY AS NEEDED FOR ITCHING.    Class: Eprescribe    exemestane (AROMASIN) 25 MG tablet 30 tablet 11 3/29/2016     Sig - Route: Take 1 tablet by mouth daily. - Oral    Class: Eprescribe    amLODIPine (NORVASC) 10 MG tablet 90 tablet 1 2/22/2016     Sig: TAKE 1 TABLET DAILY    Class: Eprescribe    aspirin 81 MG tablet        Sig - Route: Take 81 mg by mouth daily. - Oral    Class: Historical Med    Valacyclovir HCl (VALTREX) 1000 MG Tab 4 tablet 0 2/14/2017 2/15/2017    Sig - Route: Take 2 tablets by mouth 2 times daily for 1 day. - Oral    Class: Eprescribe    cefadroxil (DURICEF) 500 MG capsule 20 capsule 0 2/14/2017 2/24/2017    Sig - Route: Take 1 capsule by mouth 2 times daily for 10 days. - Oral    Class: Eprescribe    fluconazole (DIFLUCAN) 150 MG tablet 1 tablet 1 2/14/2017 2/14/2017    Sig - Route: Take 1 tablet by mouth once for 1 dose. - Oral    Class: Eprescribe    amLODIPine (NORVASC) 10 MG tablet 90 tablet 1 1/30/2017     Sig: TAKE 1 TABLET BY MOUTH DAILY.    Class: Eprescribe      Allergies     Contrast Media HIVES    Pt developed hives after CT contrast  injection    Tylenol With Codeine PRURITUS      Immunizations History as of 2/14/2017     Name Date    Influenza  Deferred (Patient Refused)    Tdap 8/4/2014      Problem List as of 2/14/2017     DISLOC PIP, RT INDEX - DOI 5/3/13    DJD (degenerative joint disease)    HTN (hypertension)    Cigarette smoker    Peripheral vascular disease of extremity with claudication    Cancer of right breast, lobular, clinical T1 N0 M0    Lobular carcinoma in situ of breast    Abnormal left mammogram    Lipoma of arm            Patient Instructions     None

## 2023-09-01 NOTE — PROGRESS NOTE ADULT - PROBLEM SELECTOR PLAN 10
-at home on lipitor 10 mg daily, continue for now -at home on glimepiride 2 mg BID, jardiance 10 mg daily and metformin 500 mg in AM and 1000 mg in the PM.   -will do correctional insulin for now and 5 units of lantus for now, adjust based on insulin requirements   -A1C is 8  -BG ACHS  -Lactic acidosis 2/2 to metformin in the setting of RUPA?

## 2023-09-01 NOTE — PROGRESS NOTE ADULT - ASSESSMENT
87 year old male with history of DM2, HTN, HLD. COPD sent from Dr. Duyen Black MD office.  Pt presents with chest pain, progressive CARVAJAL x 3-4 months and occasional palpitations associated with increased fatigue. Found to have NSTEMI, CHF and CKD.  Chest CT demonstrates changes c/w fibrotic interstitial lung disease.   EKG and telemetry on admission demonstrated normal sinus rhythm. However, yesterday he went into atrial fibrillation with RVR to 120's,  not associated with palpitations or chest pain but with increased shortness of breath with wheezing. Overnight he self converted to sinus rhythm. Patient feeling significantly better. Less short of breath.   Plan: Cancel LANA/DCCV          Cancel NPO       w.   Plan:   NPO after MN for possible LANA/DCCV .   Continue Eliquis for anticoagulation   Continue metoprolol tartrate 25mg bid for rate control.   Awaiting final Bcx results off antibiotics.   Patient aware of the risks, benefits and alternatives to the DCCV and is in agreement to the above plan.     87 year old male with history of DM2, HTN, HLD. COPD sent from Dr. Duyen Black MD office.  Pt presents with chest pain, progressive CARVAJAL x 3-4 months and occasional palpitations associated with increased fatigue. Found to have NSTEMI, CHF and CKD.  Chest CT demonstrates changes c/w fibrotic interstitial lung disease.   EKG and telemetry on admission demonstrated normal sinus rhythm. However, yesterday he went into atrial fibrillation with RVR to 120's,  not associated with palpitations or chest pain but with increased shortness of breath with wheezing. Overnight he self converted to sinus rhythm. Patient feeling significantly better. Less short of breath.   Plan: Cancel LANA/DCCV          Cancel NPO          Continue apixaban for anticoagulation          Continue metoprolol tartrate 25mg bid for rate control          Awaiting final BCx results off antibiotics.

## 2023-09-01 NOTE — DIETITIAN INITIAL EVALUATION ADULT - OTHER INFO
Per chart, pt is 87 year old male PMH type 2 DM, HTN, HLD, COPD presenting with progressive chest pressure likely secondary to to new diagnosis of pulmonary fibrosis with course complicated by Afib with RVR. Pulmonology, Cardiology and EP following.     Pt confirms NKFA, denies difficulties chewing/swallowing. Pt lives at home with wife, who prepares meals. Pt reports generally good appetite/PO intake PTA. Pt reports using "a little" salt at meal times. History of type 2 DM, HbA1c (8/31) 7.9%. Medications PTA inclusive of Metformin 500 mg qAM/1000 mg qPM, Jardiance 10 mg qD and Glimepiride 2 mg BID.     Pt continues to eat well in house, tolerating diet. No food preferences vocalized at this time. Fingersticks have improved since admission. Pt denies current GI distress, last BM x3 8/31 per flowsheets. Ordered for Miralax 17 gm BID.  Per chart, pt is 87 year old male PMH type 2 DM, HTN, HLD, COPD presenting with progressive chest pressure likely secondary to to new diagnosis of pulmonary fibrosis with course complicated by Afib with RVR. Pulmonology, Cardiology and EP following.     Pt's wife and son at bedside. Pt confirms NKFA, denies difficulties chewing/swallowing. Pt lives at home with wife, who prepares meals. Pt reports generally good appetite/PO intake PTA. Pt reports using "a little" salt at meal times. History of type 2 DM, HbA1c (8/31) 7.9%. Medications PTA inclusive of Metformin 500 mg qAM/1000 mg qPM, Jardiance 10 mg qD and Glimepiride 2 mg BID.     Pt continues to eat well in house, tolerating diet. No food preferences vocalized at this time. Fingersticks have improved since admission. Pt denies current GI distress, last BM x3 8/31 per flowsheets. Ordered for Miralax 17 gm BID.

## 2023-09-01 NOTE — PROGRESS NOTE ADULT - SUBJECTIVE AND OBJECTIVE BOX
Patient feeling much better this morning. No palpitations, chest pain, or dizziness. Less shortness of breath this morning.   Self converted to normal sinus rhythm without conversion pause at 2:28 am.     Vital Signs Last 24 Hrs  T(C): 36.7 (01 Sep 2023 06:15), Max: 36.9 (31 Aug 2023 15:37)  T(F): 98.1 (01 Sep 2023 06:15), Max: 98.4 (31 Aug 2023 15:37)  HR: 67 (01 Sep 2023 06:15) (67 - 156)  BP: 122/76 (01 Sep 2023 06:15) (62/37 - 146/123)  BP(mean): 51 (31 Aug 2023 09:15) (51 - 78)  RR: 18 (01 Sep 2023 06:15) (17 - 22)  SpO2: 97% (01 Sep 2023 06:15) (93% - 98%)    Parameters below as of 01 Sep 2023 06:15  Patient On (Oxygen Delivery Method): room air          EKG  Telemetry: normal sinus rhythm 60's. Occasional PVC's.   MEDICATIONS  (STANDING):  apixaban 2.5 milliGRAM(s) Oral every 12 hours  atorvastatin 10 milliGRAM(s) Oral at bedtime  budesonide  80 MICROgram(s)/formoterol 4.5 MICROgram(s) Inhaler 2 Puff(s) Inhalation two times a day  dextrose 5%. 1000 milliLiter(s) (100 mL/Hr) IV Continuous <Continuous>  dextrose 5%. 1000 milliLiter(s) (50 mL/Hr) IV Continuous <Continuous>  dextrose 50% Injectable 12.5 Gram(s) IV Push once  dextrose 50% Injectable 25 Gram(s) IV Push once  dextrose 50% Injectable 25 Gram(s) IV Push once  glucagon  Injectable 1 milliGRAM(s) IntraMuscular once  insulin glargine Injectable (LANTUS) 5 Unit(s) SubCutaneous at bedtime  insulin lispro (ADMELOG) corrective regimen sliding scale   SubCutaneous at bedtime  insulin lispro (ADMELOG) corrective regimen sliding scale   SubCutaneous three times a day before meals  metoprolol tartrate 25 milliGRAM(s) Oral every 12 hours  montelukast 10 milliGRAM(s) Oral daily  polyethylene glycol 3350 17 Gram(s) Oral two times a day  tamsulosin 0.4 milliGRAM(s) Oral at bedtime    MEDICATIONS  (PRN):  acetaminophen     Tablet .. 650 milliGRAM(s) Oral every 6 hours PRN Mild Pain (1 - 3)  albuterol    90 MICROgram(s) HFA Inhaler 2 Puff(s) Inhalation every 6 hours PRN for bronchospasm  dextrose Oral Gel 15 Gram(s) Oral once PRN Blood Glucose LESS THAN 70 milliGRAM(s)/deciliter  melatonin 3 milliGRAM(s) Oral at bedtime PRN Insomnia  simethicone 80 milliGRAM(s) Chew every 6 hours PRN Gas          Physical exam:   Gen- appears more comfortable. Less shortness of breath. A&O. NAD  Resp- decreased breath sounds lower lobes. Diffuse wheezing posterior and anterior lobes. NO cough.  CV- S1 and S2 RRR. No murmurs, gallops or rubs  ABD- soft nontender + bowel sounds   EXT- no edema no calf tenderness. extremities warm and dry  Neuro- grossly nonfocal                            11.9   13.09 )-----------( 334      ( 31 Aug 2023 06:11 )             35.9     PT/INR - ( 30 Aug 2023 07:21 )   PT: 13.0 sec;   INR: 1.16 ratio         PTT - ( 30 Aug 2023 07:53 )  PTT:25.3 sec  08-31    129<L>  |  94<L>  |  34<H>  ----------------------------<  206<H>  4.6   |  23  |  1.38<H>    Ca    9.0      31 Aug 2023 20:50  Phos  4.6     08-31  Mg     2.10     08-31    TPro  8.1  /  Alb  3.5  /  TBili  0.6  /  DBili  x   /  AST  18  /  ALT  17  /  AlkPhos  69  08-30    CARDIAC MARKERS ( 30 Aug 2023 07:21 )  x     / x     / x     / x     / 4.7 ng/mL        < from: Transthoracic Echocardiogram (08.30.23 @ 14:51) >    Patient name: CARMENZA ROMEO  YOB: 1935   Age: 87 (M)   MR#: [id number]  Study Date: 8/30/2023  Location: [facility] [place] [facility] CASE Sonographer: Bernarda Marroquin RDCS  Study quality: Technically good  Referring Physician: Abena Prieto MD  Blood Pressure: 120/64 mmHg  Height: 160 cm  Weight: 71 kg  BSA: 1.8 m2  ------------------------------------------------------------------------  PROCEDURE: Transthoracic echocardiogram with 2-D, M-Mode  and complete spectral and color flow Doppler.  INDICATION: Cardiomyopathy, unspecified (I42.9)  ------------------------------------------------------------------------  DIMENSIONS:  Dimensions:     Normal Values:  LA:     3.8 cm    2.0 - 4.0 cm  Ao:     3.8 cm    2.0 - 3.8 cm  SEPTUM: 1.0 cm    0.6- 1.2 cm  PWT:    1.0 cm    0.6 - 1.1 cm  LVIDd:  4.0 cm    3.0 - 5.6 cm  LVIDs:  2.4 cm    1.8 - 4.0 cm  Derived Variables:  LVMI: 73 g/m2  RWT: 0.50  Fractional short: 40 %  Ejection Fraction (Modified Foy Rule): 64 %  ------------------------------------------------------------------------  OBSERVATIONS:  Mitral Valve: Mitral annular calcification, otherwise  normal mitral valve. Minimal mitral regurgitation.  Aortic Root: Aortic Root: 3.8 cm.  Sinotubular Junction: 2.9 cm.  Ascending Aorta: 3.6 cm.  Aortic Valve: Calcified trileaflet aortic valve with  decreased opening. Peak transaortic valve gradient equals  28 mm Hg, mean transaortic valve gradient equals 11 mm Hg.  DANTE 1 sqcm by direct planimetry. Mild-moderate aortic  regurgitation.  Left Atrium: Normal left atrium.  LA volume index = 23  cc/m2.  Left Ventricle: Normal left ventricular systolic function.  No segmental wall motion abnormalities. GLS - 20.7%  (normal). Normal left ventricular internal dimensions and  wall thicknesses. Mild diastolic dysfunction (Stage I).  Right Heart: Normal right atrium. Normal right ventricular  size and function. Normal tricuspid valve.  Mild tricuspid  regurgitation. Normal pulmonic valve. Minimal pulmonic  regurgitation.  Pericardium/PleuraNormal pericardium with no pericardial  effusion.  Hemodynamic: Estimated right ventricular systolic pressure  equals 44 mm Hg, assuming right atrial pressure equals 10  mm Hg, consistent with mild pulmonary hypertension.    CONCLUSIONS:  1. Calcified trileaflet aortic valve with decreased  opening. Peak transaortic valve gradient equals 28 mm Hg,  mean transaortic valve gradient equals 11 mm Hg. DANTE 1 sqcm  by direct planimetry.  2. Normal left ventricular systolic function. No segmental  wall motion abnormalities. GLS - 20.7% (normal).  3. Mild diastolic dysfunction (Stage I).  4. Normal right ventricular size and function.  Consider LANA to evaluate aortic valve more fully if  clinically appropriate.  ------------------------------------------------------------------------  Confirmed on  8/30/2023 - 16:24:57 by Timothy Humphrey M.D.  ------------------------------------------------------------------------               Patient feeling much better this morning. No palpitations, chest pain, or dizziness. Less shortness of breath this morning.   Self converted to normal sinus rhythm without conversion pause at 2:28 am.     Vital Signs Last 24 Hrs  T(C): 36.7 (01 Sep 2023 06:15), Max: 36.9 (31 Aug 2023 15:37)  T(F): 98.1 (01 Sep 2023 06:15), Max: 98.4 (31 Aug 2023 15:37)  HR: 67 (01 Sep 2023 06:15) (67 - 156)  BP: 122/76 (01 Sep 2023 06:15) (62/37 - 146/123)  BP(mean): 51 (31 Aug 2023 09:15) (51 - 78)  RR: 18 (01 Sep 2023 06:15) (17 - 22)  SpO2: 97% (01 Sep 2023 06:15) (93% - 98%)    Parameters below as of 01 Sep 2023 06:15  Patient On (Oxygen Delivery Method): room air          EKG: pending  Telemetry: normal sinus rhythm 60's. Occasional PVC's.   MEDICATIONS  (STANDING):  apixaban 2.5 milliGRAM(s) Oral every 12 hours  atorvastatin 10 milliGRAM(s) Oral at bedtime  budesonide  80 MICROgram(s)/formoterol 4.5 MICROgram(s) Inhaler 2 Puff(s) Inhalation two times a day  dextrose 5%. 1000 milliLiter(s) (100 mL/Hr) IV Continuous <Continuous>  dextrose 5%. 1000 milliLiter(s) (50 mL/Hr) IV Continuous <Continuous>  dextrose 50% Injectable 12.5 Gram(s) IV Push once  dextrose 50% Injectable 25 Gram(s) IV Push once  dextrose 50% Injectable 25 Gram(s) IV Push once  glucagon  Injectable 1 milliGRAM(s) IntraMuscular once  insulin glargine Injectable (LANTUS) 5 Unit(s) SubCutaneous at bedtime  insulin lispro (ADMELOG) corrective regimen sliding scale   SubCutaneous at bedtime  insulin lispro (ADMELOG) corrective regimen sliding scale   SubCutaneous three times a day before meals  metoprolol tartrate 25 milliGRAM(s) Oral every 12 hours  montelukast 10 milliGRAM(s) Oral daily  polyethylene glycol 3350 17 Gram(s) Oral two times a day  tamsulosin 0.4 milliGRAM(s) Oral at bedtime    MEDICATIONS  (PRN):  acetaminophen     Tablet .. 650 milliGRAM(s) Oral every 6 hours PRN Mild Pain (1 - 3)  albuterol    90 MICROgram(s) HFA Inhaler 2 Puff(s) Inhalation every 6 hours PRN for bronchospasm  dextrose Oral Gel 15 Gram(s) Oral once PRN Blood Glucose LESS THAN 70 milliGRAM(s)/deciliter  melatonin 3 milliGRAM(s) Oral at bedtime PRN Insomnia  simethicone 80 milliGRAM(s) Chew every 6 hours PRN Gas          Physical exam:   Gen- appears more comfortable. Less shortness of breath. A&O. NAD  Resp- decreased breath sounds lower lobes. Diffuse wheezing posterior and anterior lobes. NO cough.  CV- S1 and S2 RRR. No murmurs, gallops or rubs  ABD- soft nontender + bowel sounds   EXT- no edema no calf tenderness. extremities warm and dry  Neuro- grossly nonfocal                            11.9   13.09 )-----------( 334      ( 31 Aug 2023 06:11 )             35.9     PT/INR - ( 30 Aug 2023 07:21 )   PT: 13.0 sec;   INR: 1.16 ratio         PTT - ( 30 Aug 2023 07:53 )  PTT:25.3 sec  08-31    129<L>  |  94<L>  |  34<H>  ----------------------------<  206<H>  4.6   |  23  |  1.38<H>    Ca    9.0      31 Aug 2023 20:50  Phos  4.6     08-31  Mg     2.10     08-31    TPro  8.1  /  Alb  3.5  /  TBili  0.6  /  DBili  x   /  AST  18  /  ALT  17  /  AlkPhos  69  08-30    CARDIAC MARKERS ( 30 Aug 2023 07:21 )  x     / x     / x     / x     / 4.7 ng/mL        < from: Transthoracic Echocardiogram (08.30.23 @ 14:51) >    Patient name: CARMENZA ROMEO  YOB: 1935   Age: 87 (M)   MR#: [id number]  Study Date: 8/30/2023  Location: [facility] [place] [facility] CASE Sonographer: Bernarda Marroquin [facility]  Study quality: Technically good  Referring Physician: Abena Prieto MD  Blood Pressure: 120/64 mmHg  Height: 160 cm  Weight: 71 kg  BSA: 1.8 m2  ------------------------------------------------------------------------  PROCEDURE: Transthoracic echocardiogram with 2-D, M-Mode  and complete spectral and color flow Doppler.  INDICATION: Cardiomyopathy, unspecified (I42.9)  ------------------------------------------------------------------------  DIMENSIONS:  Dimensions:     Normal Values:  LA:     3.8 cm    2.0 - 4.0 cm  Ao:     3.8 cm    2.0 - 3.8 cm  SEPTUM: 1.0 cm    0.6- 1.2 cm  PWT:    1.0 cm    0.6 - 1.1 cm  LVIDd:  4.0 cm    3.0 - 5.6 cm  LVIDs:  2.4 cm    1.8 - 4.0 cm  Derived Variables:  LVMI: 73 g/m2  RWT: 0.50  Fractional short: 40 %  Ejection Fraction (Modified Foy Rule): 64 %  ------------------------------------------------------------------------  OBSERVATIONS:  Mitral Valve: Mitral annular calcification, otherwise  normal mitral valve. Minimal mitral regurgitation.  Aortic Root: Aortic Root: 3.8 cm.  Sinotubular Junction: 2.9 cm.  Ascending Aorta: 3.6 cm.  Aortic Valve: Calcified trileaflet aortic valve with  decreased opening. Peak transaortic valve gradient equals  28 mm Hg, mean transaortic valve gradient equals 11 mm Hg.  DANTE 1 sqcm by direct planimetry. Mild-moderate aortic  regurgitation.  Left Atrium: Normal left atrium.  LA volume index = 23  cc/m2.  Left Ventricle: Normal left ventricular systolic function.  No segmental wall motion abnormalities. GLS - 20.7%  (normal). Normal left ventricular internal dimensions and  wall thicknesses. Mild diastolic dysfunction (Stage I).  Right Heart: Normal right atrium. Normal right ventricular  size and function. Normal tricuspid valve.  Mild tricuspid  regurgitation. Normal pulmonic valve. Minimal pulmonic  regurgitation.  Pericardium/PleuraNormal pericardium with no pericardial  effusion.  Hemodynamic: Estimated right ventricular systolic pressure  equals 44 mm Hg, assuming right atrial pressure equals 10  mm Hg, consistent with mild pulmonary hypertension.    CONCLUSIONS:  1. Calcified trileaflet aortic valve with decreased  opening. Peak transaortic valve gradient equals 28 mm Hg,  mean transaortic valve gradient equals 11 mm Hg. DANTE 1 sqcm  by direct planimetry.  2. Normal left ventricular systolic function. No segmental  wall motion abnormalities. GLS - 20.7% (normal).  3. Mild diastolic dysfunction (Stage I).  4. Normal right ventricular size and function.  Consider LANA to evaluate aortic valve more fully if  clinically appropriate.  ------------------------------------------------------------------------  Confirmed on  8/30/2023 - 16:24:57 by Timothy Humphrey M.D.  ------------------------------------------------------------------------

## 2023-09-01 NOTE — DIETITIAN INITIAL EVALUATION ADULT - HEIGHT FOR BMI (FEET)
87 y/o female with hx long-standing MR and severe pHTN, NICM s/p ICD, epilepsy s/p intracranial shunt, PPM, absent seizure, p/w sudden onset of confusion. Last seen normal was this afternoon. Around 450pm today, daughter found patient was confused and having slurred speech/aphasia. Daughter thought she was having absent seizure and did not call ambulance until 2-3 hours later. In the ED, SBP in 240s. ICU was called. tPA not given. ambulatory 5

## 2023-09-01 NOTE — DISCHARGE NOTE PROVIDER - NSDCFUSCHEDAPPT_GEN_ALL_CORE_FT
Lisker, Gita N  [facility]  [address]  Scheduled Appointment: 09/05/2023

## 2023-09-01 NOTE — PROGRESS NOTE ADULT - SUBJECTIVE AND OBJECTIVE BOX
Cardiology Fellow Consult Progress Note    Subjective:  patient overall feeling well, denies CP or SOB. No new complaints    On tele overnight was in AF rates 90s-110s until 2:30am, then converted back to sinus, rates ~70s-80s.    No acute events overnight. ROS negative at the bedside.     REVIEW OF SYSTEMS:  All other review of systems is negative unless indicated above.    Physical Exam:  T(F): 98.1 (09-01), Max: 98.4 (08-31)  HR: 67 (09-01) (67 - 137)  BP: 122/76 (09-01) (62/37 - 146/123)  RR: 18 (09-01)  SpO2: 97% (09-01)  General: NAD  Cardiovascular: Normal S1 S2, No JVD, No murmurs, No edema  Respiratory: Lungs clear to auscultation	  Gastrointestinal:  Soft, Non-tender, + BS	  Skin: warm and dry, No rashes, No ecchymoses, No cyanosis	  Extremities:  No clubbing, cyanosis or edema  Vascular: Peripheral pulses palpable 2+ bilaterally      Cardiovascular diagnostic testings: personally reviewed    Imaging diagnostic testings: personally reviewed    Labs: Personally reviewed                        11.9   13.09 )-----------( 334      ( 31 Aug 2023 06:11 )             35.9     08-31    129<L>  |  94<L>  |  34<H>  ----------------------------<  206<H>  4.6   |  23  |  1.38<H>    Ca    9.0      31 Aug 2023 20:50  Phos  4.6     08-31  Mg     2.10     08-31          CARDIAC MARKERS ( 30 Aug 2023 07:21 )  92 ng/L / x     / x     / x     / x     / 4.7 ng/mL  CARDIAC MARKERS ( 29 Aug 2023 20:28 )  106 ng/L / x     / x     / 95 U/L / x     / 5.0 ng/mL  CARDIAC MARKERS ( 29 Aug 2023 18:39 )  98 ng/L / x     / x     / x     / x     / x

## 2023-09-01 NOTE — DISCHARGE NOTE PROVIDER - CARE PROVIDER_API CALL
Lisker, Gita Naomi  Pulmonary Disease  [address] [place] [id number]  Phone: [phone]  Fax: [phone]  Scheduled Appointment: 09/05/2023 09:00 AM   Lisker, Gita Naomi  Pulmonary Disease  [address], [address]  [place] [address]  Phone: [phone]  Fax: [phone]  Scheduled Appointment: 09/05/2023 09:00 AM    Timothy Humphery  Cardiovascular Disease  [address] [address] [place] [address], [address] [place] [id number]  Phone: [phone]  Fax: [phone]  Follow Up Time: 1 week

## 2023-09-01 NOTE — DIETITIAN INITIAL EVALUATION ADULT - PERTINENT MEDS FT
atorvastatin  LANTUS 5U qHS   ADMELOG corrective regimen sliding scale    lactated ringers IV Continuous   polyethylene glycol   simethicone PRN

## 2023-09-01 NOTE — DIETITIAN INITIAL EVALUATION ADULT - OTHER CALCULATIONS
Dosing weight (8/31) 160 lbs / 72.6 kg.  No weight history available via chart.   Pt reports usual body weight 168 lbs with intentional weight loss over the past few months to 158 lbs (6% / 10 lb loss).   Ideal Body Weight: 142 lbs / 64.5 kg +/-10%

## 2023-09-01 NOTE — PROGRESS NOTE ADULT - NS ATTEND AMEND GEN_ALL_CORE FT
87 year old male with history of DM2, HTN, HLD. COPD admitted with chest pain and SOB found to have NSTEMI, CHF and CKD.  Chest CT demonstrates changes c/w fibrotic interstitial lung disease.   EKG and telemetry on admission demonstrated normal sinus rhythm. Was in AF yesterday but converted ot sinus rhythm spontaneously. Will cont AC.

## 2023-09-01 NOTE — PROGRESS NOTE ADULT - PROBLEM SELECTOR PLAN 1
ddx: demand ischemia possibly 2/2 to heart failure  -troponin 98->106, trend with CKMB to peak  -cardiac monitoring   -TTE-normal LEF, no WMA, some AS noted  -proceed with stress test, hopefully today
ddx: demand ischemia possibly 2/2 to heart failure  -troponin 98->106, trend with CKMB to peak  -cardiac monitoring   -f/u EKG  -TTE ordered   -appreciate cards input
ddx: demand ischemia possibly 2/2 to heart failure  -troponin 98->106, trend with CKMB to peak  -cardiac monitoring   -TTE-normal LEF, no WMA, some AS noted  -proceed with chest pain
less than 2 seconds

## 2023-09-01 NOTE — DISCHARGE NOTE PROVIDER - HOSPITAL COURSE
Mr. Liao is a 87 year old M with history of DM2 (non-insulin dependent, HTN, HLD. COPD who presents with chest pressure and dyspnea at rest for the last week. He describes the chest pressure as pinch like sensation as if plastic is sitting on his chest (substernal). He states it is non-exertional occurs when he is lying down. He states over the last week he has had a decline in his urine output with associated dysuria, which has progressed to not being able to urinate in the last day. He  reports that the pain is intermittent and occurs when he is lying down (non-exertional). He also reports he has lost 10 pounds in the last 6 months with associated decrease in appetite.  He reports a few months ago his urine test was positive for blood for which he had it worked up per patient.     #Dyspnea: Pt underwent a CT chest which showed new diagnosis of pulm fibrosis. He had an echo done which did not show any new wall motion abnormalities, mild diastolic dysfunction, and calcified aortic valve. Pt underwent a stress test which showed ***. Pulm was consulted for pulm fibrosis, he was initiated on symbicort. serology sent and is pending. Pt will f/u outpatient with pulm     #New onset Afib-his course was complicated by afib with rvr resulting in hypotension. Pt given IVF and placed on metoprolol BID. he self converted back to sinus. EP and cards on board, no cardioversion needed. will need f/u as outpatient    #RUPA likely 2/2 to dehydration: Cr peak was 1.58. Pt initially diuresed because of concern for heart failure. Pt was fluid resuscitated with normalization of sodium and improvement of cr to 1.24. Losartan was held during hospital stay.    #lactic acidosis: likely secondary to metformin and RUPA. metformin d/c to be resumed as outpatient once repeat labs are done Mr. Liao is a 87 year old M with history of DM2 (non-insulin dependent, HTN, HLD. COPD who presents with chest pressure and dyspnea at rest for the last week. He describes the chest pressure as pinch like sensation as if plastic is sitting on his chest (substernal). He states it is non-exertional occurs when he is lying down. He states over the last week he has had a decline in his urine output with associated dysuria, which has progressed to not being able to urinate in the last day. He  reports that the pain is intermittent and occurs when he is lying down (non-exertional). He also reports he has lost 10 pounds in the last 6 months with associated decrease in appetite.  He reports a few months ago his urine test was positive for blood for which he had it worked up per patient.     #Dyspnea: Pt underwent a CT chest which showed new diagnosis of pulm fibrosis. He had an echo done which did not show any new wall motion abnormalities, mild diastolic dysfunction, and calcified aortic valve. Pt underwent a stress test which was negative. trop x 2 mildly elevated but in the setting of rupa. Pulm was consulted for pulm fibrosis, he was initiated on symbicort. serology sent and is pending. Pt will f/u outpatient with pulm     #New onset Afib-his course was complicated by afib with rvr resulting in hypotension. Pt given IVF and placed on metoprolol BID. he self converted back to sinus. EP and cards on board, no cardioversion needed. will need f/u as outpatient    #RUPA likely 2/2 to dehydration: Cr peak was 1.58. Pt initially diuresed because of concern for heart failure. Pt was fluid resuscitated with normalization of sodium and improvement of cr to 1.24. Losartan was held during hospital stay.    #lactic acidosis: likely secondary to metformin and RUPA. metformin d/c to be resumed as outpatient once repeat labs are done

## 2023-09-01 NOTE — PROGRESS NOTE ADULT - PROBLEM SELECTOR PLAN 11
Statement Selected DVT prophylaxis: eliquis  GI prophylaxis: none   Diet: diabetes cardiac diet continue losartan 100 mg daily and amlodipine 2.5 mg daily with BP parameters   -c/w BB  -continue to monitor BP losartan d/c yesterday due to hypotension  off of amlodipine  will see if we can resume low dose losartan inpatient vs outpatient  -c/w BB  -continue to monitor BP

## 2023-09-01 NOTE — DIETITIAN INITIAL EVALUATION ADULT - PERTINENT LABORATORY DATA
(9/1) Na 135, BUN 30<H>, Cr 1.24, <H>, K+ 4.5, Phos 4.3, Mg 2.30  (8/31) HbA1c 7.9%<H>  POCT: (9/1) 131, (8/31) 121-258

## 2023-09-01 NOTE — PROGRESS NOTE ADULT - ASSESSMENT
87 year old M with history of DM2, HTN, HLD. COPD sent from the office of Dr. Duyen Black for chest pain.    Likely demand ischemia with mildly elevated but flat enzymes. Low c/f ACS.    TTE showing EF64%, grade 1 DD, calcified aortic valve with peak gradient 28.    On 8/31, developed new AF with RVR, rates in the 140s-150s, improved on Lopressor and started on AC, 2:30am on 9/1 converted back to sinus.    Recommendations:  - Tele  - Continue Lopressor 25mg BID  - Continue Eliquis  - EP was considering LANA/DCCV today, no longer applicable given patient self-converted back to sinus  - C/w lipitor  - No need to treat for ACS  - Euvolemic on exam, can continue off lasix  - On the schedule for nuclear stress test either today or Sunday    Note incomplete until cosigned by attending.    Dima Grace, PGY-4  Cardiology Fellow    For all new consults  [email or web address]  Login: [email or web address]

## 2023-09-01 NOTE — PROGRESS NOTE ADULT - SUBJECTIVE AND OBJECTIVE BOX
CHIEF COMPLAINT: caal    Interval Events: Feels well, slight cough but chronic.    REVIEW OF SYSTEMS:  Constitutional: [ ] negative [ ] fevers [ ] chills [ ] weight loss [ ] weight gain  HEENT: [ ] negative [ ] dry eyes [ ] eye irritation [ ] postnasal drip [ ] nasal congestion  CV: [ ] negative  [ ] chest pain [ ] orthopnea [ ] palpitations [ ] murmur  Resp: [ ] negative [ ] cough [ ] shortness of breath [ ] dyspnea [ ] wheezing [ ] sputum [ ] hemoptysis  GI: [ ] negative [ ] nausea [ ] vomiting [ ] diarrhea [ ] constipation [ ] abd pain [ ] dysphagia   : [ ] negative [ ] dysuria [ ] nocturia [ ] hematuria [ ] increased urinary frequency  Musculoskeletal: [ ] negative [ ] back pain [ ] myalgias [ ] arthralgias [ ] fracture  Skin: [ ] negative [ ] rash [ ] itch  Neurological: [ ] negative [ ] headache [ ] dizziness [ ] syncope [ ] weakness [ ] numbness  Psychiatric: [ ] negative [ ] anxiety [ ] depression  Endocrine: [ ] negative [ ] diabetes [ ] thyroid problem  Hematologic/Lymphatic: [ ] negative [ ] anemia [ ] bleeding problem  Allergic/Immunologic: [ ] negative [ ] itchy eyes [ ] nasal discharge [ ] hives [ ] angioedema  [x ] All other systems negative  [ ] Unable to assess ROS because ________    OBJECTIVE:  ICU Vital Signs Last 24 Hrs  T(C): 36.4 (01 Sep 2023 13:07), Max: 36.9 (31 Aug 2023 15:37)  T(F): 97.6 (01 Sep 2023 13:07), Max: 98.4 (31 Aug 2023 15:37)  HR: 73 (01 Sep 2023 13:07) (67 - 101)  BP: 142/74 (01 Sep 2023 13:07) (97/75 - 142/74)  BP(mean): --  ABP: --  ABP(mean): --  RR: 18 (01 Sep 2023 13:07) (17 - 22)  SpO2: 98% (01 Sep 2023 13:07) (93% - 98%)    O2 Parameters below as of 01 Sep 2023 13:07  Patient On (Oxygen Delivery Method): room air              08-31 @ 07:01  -  09-01 @ 07:00  --------------------------------------------------------  IN: 1080 mL / OUT: 2250 mL / NET: -1170 mL      CAPILLARY BLOOD GLUCOSE      POCT Blood Glucose.: 131 mg/dL (01 Sep 2023 09:06)      PHYSICAL EXAM:  General: NAD, laying in bed  HEENT: PERRLA, EOMI, sclera non-icteric  Neck: JVD absent  Respiratory: Bibasilar crackles, no wheezing, no accessory muscle use  Cardiovascular:  No murmurs/rubs/gallops  Abdomen: Soft, NT, ND  Extremities: No LE edema   Skin: No rashes or lesions   Neurological: Sensation grossly intact, strength 5/5 in all extremities  Psychiatry: [address] MEDICATIONS:  apixaban 2.5 milliGRAM(s) Oral every 12 hours      metoprolol tartrate 25 milliGRAM(s) Oral every 12 hours    atorvastatin 10 milliGRAM(s) Oral at bedtime  dextrose 50% Injectable 12.5 Gram(s) IV Push once  dextrose 50% Injectable 25 Gram(s) IV Push once  dextrose 50% Injectable 25 Gram(s) IV Push once  dextrose Oral Gel 15 Gram(s) Oral once PRN  glucagon  Injectable 1 milliGRAM(s) IntraMuscular once  insulin glargine Injectable (LANTUS) 5 Unit(s) SubCutaneous at bedtime  insulin lispro (ADMELOG) corrective regimen sliding scale   SubCutaneous three times a day before meals  insulin lispro (ADMELOG) corrective regimen sliding scale   SubCutaneous at bedtime    albuterol    90 MICROgram(s) HFA Inhaler 2 Puff(s) Inhalation every 6 hours PRN  budesonide  80 MICROgram(s)/formoterol 4.5 MICROgram(s) Inhaler 2 Puff(s) Inhalation two times a day  montelukast 10 milliGRAM(s) Oral daily    acetaminophen     Tablet .. 650 milliGRAM(s) Oral every 6 hours PRN  melatonin 3 milliGRAM(s) Oral at bedtime PRN    polyethylene glycol 3350 17 Gram(s) Oral two times a day  simethicone 80 milliGRAM(s) Chew every 6 hours PRN      tamsulosin 0.4 milliGRAM(s) Oral at bedtime    dextrose 5%. 1000 milliLiter(s) IV Continuous <Continuous>  dextrose 5%. 1000 milliLiter(s) IV Continuous <Continuous>  lactated ringers. 1000 milliLiter(s) IV Continuous <Continuous>            LABS:                        11.8   12.62 )-----------( 347      ( 01 Sep 2023 06:18 )             36.2     Hgb Trend: 11.8<--, 11.9<--, 12.5<--, 12.2<--  09-01    135  |  97<L>  |  30<H>  ----------------------------<  135<H>  4.5   |  26  |  1.24    Ca    9.1      01 Sep 2023 06:18  Phos  4.3     09-01  Mg     2.30     09-01      Creatinine Trend: 1.24<--, 1.38<--, 1.55<--, 1.47<--, 1.58<--    Urinalysis Basic - ( 01 Sep 2023 06:18 )    Color: x / Appearance: x / SG: x / pH: x  Gluc: 135 mg/dL / Ketone: x  / Bili: x / Urobili: x   Blood: x / Protein: x / Nitrite: x   Leuk Esterase: x / RBC: x / WBC x   Sq Epi: x / Non Sq Epi: x / Bacteria: x            MICROBIOLOGY:     RADIOLOGY:  [ ] Reviewed and interpreted by me    PULMONARY FUNCTION TESTS:    EKG:

## 2023-09-01 NOTE — PROGRESS NOTE ADULT - SUBJECTIVE AND OBJECTIVE BOX
Abena Prieto MD   [facility] Medicine  Teams preferred  Pager: [phone]    Patient is a 87y old  Male who presents with a chief complaint of NSTEMI, renal failure, and CHF (01 Sep 2023 08:05)      INTERVAL HPI/OVERNIGHT EVENTS:    MEDICATIONS  (STANDING):  apixaban 2.5 milliGRAM(s) Oral every 12 hours  atorvastatin 10 milliGRAM(s) Oral at bedtime  budesonide  80 MICROgram(s)/formoterol 4.5 MICROgram(s) Inhaler 2 Puff(s) Inhalation two times a day  dextrose 5%. 1000 milliLiter(s) (100 mL/Hr) IV Continuous <Continuous>  dextrose 5%. 1000 milliLiter(s) (50 mL/Hr) IV Continuous <Continuous>  dextrose 50% Injectable 12.5 Gram(s) IV Push once  dextrose 50% Injectable 25 Gram(s) IV Push once  dextrose 50% Injectable 25 Gram(s) IV Push once  glucagon  Injectable 1 milliGRAM(s) IntraMuscular once  insulin glargine Injectable (LANTUS) 5 Unit(s) SubCutaneous at bedtime  insulin lispro (ADMELOG) corrective regimen sliding scale   SubCutaneous at bedtime  insulin lispro (ADMELOG) corrective regimen sliding scale   SubCutaneous three times a day before meals  metoprolol tartrate 25 milliGRAM(s) Oral every 12 hours  montelukast 10 milliGRAM(s) Oral daily  polyethylene glycol 3350 17 Gram(s) Oral two times a day  tamsulosin 0.4 milliGRAM(s) Oral at bedtime    MEDICATIONS  (PRN):  acetaminophen     Tablet .. 650 milliGRAM(s) Oral every 6 hours PRN Mild Pain (1 - 3)  albuterol    90 MICROgram(s) HFA Inhaler 2 Puff(s) Inhalation every 6 hours PRN for bronchospasm  dextrose Oral Gel 15 Gram(s) Oral once PRN Blood Glucose LESS THAN 70 milliGRAM(s)/deciliter  melatonin 3 milliGRAM(s) Oral at bedtime PRN Insomnia  simethicone 80 milliGRAM(s) Chew every 6 hours PRN Gas      Allergies    No Known Allergies    Intolerances        REVIEW OF SYSTEMS:  Please see interval HPI:    Vital Signs Last 24 Hrs  T(C): 36.7 (01 Sep 2023 06:15), Max: 36.9 (31 Aug 2023 15:37)  T(F): 98.1 (01 Sep 2023 06:15), Max: 98.4 (31 Aug 2023 15:37)  HR: 67 (01 Sep 2023 06:15) (67 - 137)  BP: 122/76 (01 Sep 2023 06:15) (62/37 - 146/123)  BP(mean): 51 (31 Aug 2023 09:15) (51 - 51)  RR: 18 (01 Sep 2023 06:15) (17 - 22)  SpO2: 97% (01 Sep 2023 06:15) (93% - 98%)    Parameters below as of 01 Sep 2023 06:15  Patient On (Oxygen Delivery Method): room air      I&O's Detail    31 Aug 2023 07:01  -  01 Sep 2023 07:00  --------------------------------------------------------  IN:    Oral Fluid: 240 mL    Oral Fluid: 840 mL  Total IN: 1080 mL    OUT:    Voided (mL): 2250 mL  Total OUT: 2250 mL    Total NET: -1170 mL            PHYSICAL EXAM:  Vital Signs Last 24 Hrs  T(C): 36.7 (01 Sep 2023 06:15), Max: 36.9 (31 Aug 2023 15:37)  T(F): 98.1 (01 Sep 2023 06:15), Max: 98.4 (31 Aug 2023 15:37)  HR: 67 (01 Sep 2023 06:15) (67 - 137)  BP: 122/76 (01 Sep 2023 06:15) (62/37 - 146/123)  BP(mean): 51 (31 Aug 2023 09:15) (51 - 51)  RR: 18 (01 Sep 2023 06:15) (17 - 22)  SpO2: 97% (01 Sep 2023 06:15) (93% - 98%)    Parameters below as of 01 Sep 2023 06:15  Patient On (Oxygen Delivery Method): room air      CONSTITUTIONAL: NAD,   ENMT: Moist oral mucosa,   RESPIRATORY: Normal respiratory effort; lungs are clear to auscultation bilaterally  CARDIOVASCULAR: Regular rate and rhythm, normal S1 and S2, no murmur/rub/gallop; No lower extremity edema;   ABDOMEN: Nontender to palpation, normoactive bowel sounds, no rebound/guarding; No hepatosplenomegaly  EXT: no edema b/l  NEUROLOGY: alert, following commands  SKIN: No rashes; no palpable lesions      LABS:    31 Aug 2023 20:50    129    |  94     |  34     ----------------------------<  206    4.6     |  23     |  1.38     Ca    9.0        31 Aug 2023 20:50  Phos  4.6       31 Aug 2023 20:50  Mg     2.10      31 Aug 2023 20:50        CAPILLARY BLOOD GLUCOSE      POCT Blood Glucose.: 180 mg/dL (31 Aug 2023 21:22)  POCT Blood Glucose.: 121 mg/dL (31 Aug 2023 17:25)  POCT Blood Glucose.: 258 mg/dL (31 Aug 2023 12:45)  POCT Blood Glucose.: 218 mg/dL (31 Aug 2023 08:33)    BLOOD CULTURE  08-30 @ 07:35   No growth at 24 hours  --  --  08-30 @ 07:21   No growth at 24 hours  --  --  08-30 @ 00:26   >=3 organisms. Probable collection contamination.  --  --  08-29 @ 23:00   No growth at 48 Hours  --  --  08-29 @ 22:50   Growth in aerobic bottle: Staphylococcus hominis  Coagulase Negative Staphylococci isolated from a single blood culture set  may represent contamination.  Contact the Microbiology Department at [phone] if susceptibility  testing is clinically indicated.  Direct identification is available within approximately 3-5  hours either by Blood Panel Multiplexed PCR or Direct  MALDI-TOF. Details: [email or web address]  --  Blood Culture PCR    RADIOLOGY & ADDITIONAL TESTS:    Imaging Personally Reviewed:  [ ] YES     Consultant(s) Notes Reviewed:      Care Discussed with Consultants/Other Providers: Abena Prieto MD   [facility] Medicine  Teams preferred  Pager: [phone]    Patient is a 87y old  Male who presents with a chief complaint of NSTEMI, renal failure, and CHF (01 Sep 2023 08:05)      INTERVAL HPI/OVERNIGHT EVENTS: currently on RA doing well. denies CP/SOB. overnight Pt broke into sinus rhythm. no other concerns    MEDICATIONS  (STANDING):  apixaban 2.5 milliGRAM(s) Oral every 12 hours  atorvastatin 10 milliGRAM(s) Oral at bedtime  budesonide  80 MICROgram(s)/formoterol 4.5 MICROgram(s) Inhaler 2 Puff(s) Inhalation two times a day  dextrose 5%. 1000 milliLiter(s) (100 mL/Hr) IV Continuous <Continuous>  dextrose 5%. 1000 milliLiter(s) (50 mL/Hr) IV Continuous <Continuous>  dextrose 50% Injectable 12.5 Gram(s) IV Push once  dextrose 50% Injectable 25 Gram(s) IV Push once  dextrose 50% Injectable 25 Gram(s) IV Push once  glucagon  Injectable 1 milliGRAM(s) IntraMuscular once  insulin glargine Injectable (LANTUS) 5 Unit(s) SubCutaneous at bedtime  insulin lispro (ADMELOG) corrective regimen sliding scale   SubCutaneous at bedtime  insulin lispro (ADMELOG) corrective regimen sliding scale   SubCutaneous three times a day before meals  metoprolol tartrate 25 milliGRAM(s) Oral every 12 hours  montelukast 10 milliGRAM(s) Oral daily  polyethylene glycol 3350 17 Gram(s) Oral two times a day  tamsulosin 0.4 milliGRAM(s) Oral at bedtime    MEDICATIONS  (PRN):  acetaminophen     Tablet .. 650 milliGRAM(s) Oral every 6 hours PRN Mild Pain (1 - 3)  albuterol    90 MICROgram(s) HFA Inhaler 2 Puff(s) Inhalation every 6 hours PRN for bronchospasm  dextrose Oral Gel 15 Gram(s) Oral once PRN Blood Glucose LESS THAN 70 milliGRAM(s)/deciliter  melatonin 3 milliGRAM(s) Oral at bedtime PRN Insomnia  simethicone 80 milliGRAM(s) Chew every 6 hours PRN Gas      Allergies    No Known Allergies    Intolerances        REVIEW OF SYSTEMS:  Please see interval HPI:    Vital Signs Last 24 Hrs  T(C): 36.7 (01 Sep 2023 06:15), Max: 36.9 (31 Aug 2023 15:37)  T(F): 98.1 (01 Sep 2023 06:15), Max: 98.4 (31 Aug 2023 15:37)  HR: 67 (01 Sep 2023 06:15) (67 - 137)  BP: 122/76 (01 Sep 2023 06:15) (62/37 - 146/123)  BP(mean): 51 (31 Aug 2023 09:15) (51 - 51)  RR: 18 (01 Sep 2023 06:15) (17 - 22)  SpO2: 97% (01 Sep 2023 06:15) (93% - 98%)    Parameters below as of 01 Sep 2023 06:15  Patient On (Oxygen Delivery Method): room air      I&O's Detail    31 Aug 2023 07:01  -  01 Sep 2023 07:00  --------------------------------------------------------  IN:    Oral Fluid: 240 mL    Oral Fluid: 840 mL  Total IN: 1080 mL    OUT:    Voided (mL): 2250 mL  Total OUT: 2250 mL    Total NET: -1170 mL            PHYSICAL EXAM:  Vital Signs Last 24 Hrs  T(C): 36.7 (01 Sep 2023 06:15), Max: 36.9 (31 Aug 2023 15:37)  T(F): 98.1 (01 Sep 2023 06:15), Max: 98.4 (31 Aug 2023 15:37)  HR: 67 (01 Sep 2023 06:15) (67 - 137)  BP: 122/76 (01 Sep 2023 06:15) (62/37 - 146/123)  BP(mean): 51 (31 Aug 2023 09:15) (51 - 51)  RR: 18 (01 Sep 2023 06:15) (17 - 22)  SpO2: 97% (01 Sep 2023 06:15) (93% - 98%)    Parameters below as of 01 Sep 2023 06:15  Patient On (Oxygen Delivery Method): room air      CONSTITUTIONAL: NAD,   ENMT: Moist oral mucosa,   RESPIRATORY: Normal respiratory effort; mild inspiratory crackles  CARDIOVASCULAR: Regular rate and rhythm, normal S1 and S2, no murmur/rub/gallop; No lower extremity edema;   ABDOMEN: Nontender to palpation, normoactive bowel sounds, no rebound/guarding; No hepatosplenomegaly  EXT: no edema b/l  NEUROLOGY: alert, following commands  SKIN: No rashes; no palpable lesions      LABS:    31 Aug 2023 20:50    129    |  94     |  34     ----------------------------<  206    4.6     |  23     |  1.38     Ca    9.0        31 Aug 2023 20:50  Phos  4.6       31 Aug 2023 20:50  Mg     2.10      31 Aug 2023 20:50        CAPILLARY BLOOD GLUCOSE      POCT Blood Glucose.: 180 mg/dL (31 Aug 2023 21:22)  POCT Blood Glucose.: 121 mg/dL (31 Aug 2023 17:25)  POCT Blood Glucose.: 258 mg/dL (31 Aug 2023 12:45)  POCT Blood Glucose.: 218 mg/dL (31 Aug 2023 08:33)    BLOOD CULTURE  08-30 @ 07:35   No growth at 24 hours  --  --  08-30 @ 07:21   No growth at 24 hours  --  --  08-30 @ 00:26   >=3 organisms. Probable collection contamination.  --  --  08-29 @ 23:00   No growth at 48 Hours  --  --  08-29 @ 22:50   Growth in aerobic bottle: Staphylococcus hominis  Coagulase Negative Staphylococci isolated from a single blood culture set  may represent contamination.  Contact the Microbiology Department at [phone] if susceptibility  testing is clinically indicated.  Direct identification is available within approximately 3-5  hours either by Blood Panel Multiplexed PCR or Direct  MALDI-TOF. Details: [email or web address]  --  Blood Culture PCR    RADIOLOGY & ADDITIONAL TESTS:    Imaging Personally Reviewed:  [ ] YES     Consultant(s) Notes Reviewed:      Care Discussed with Consultants/Other Providers:

## 2023-09-01 NOTE — PROGRESS NOTE ADULT - PROBLEM SELECTOR PLAN 8
-at home on glimepiride 2 mg BID, jardiance 10 mg daily and metformin 500 mg in AM and 1000 mg in the PM.   -will do correctional insulin for now and 5 units of lantus for now, adjust based on insulin requirements   -A1C is 8  -BG ACHS  -Lactic acidosis 2/2 to metformin in the setting of RUPA? -WBC 17 and tenderness to abdomen with palpation  -given benign exam today, hold off on CT abd  -bowel regimen-may need enema  -Blood culturex2, 1 is coag neg staph, the other is negative  -s/p cipro and flagyl-d/c after 24 hrs  -UA negative, urine culture -pending   -LA normalized

## 2023-09-01 NOTE — PROGRESS NOTE ADULT - PROBLEM SELECTOR PLAN 9
continue losartan 100 mg daily and amlodipine 2.5 mg daily with BP parameters   -c/w BB  -continue to monitor BP -LA 2.2 on admission   -now normalized  -no other infectious prodrome  -stop antibiotics

## 2023-09-01 NOTE — DISCHARGE NOTE NURSING/CASE MANAGEMENT/SOCIAL WORK - PATIENT PORTAL LINK FT
You can access the FollowMyHealth Patient Portal offered by [facility] by registering at the following website: [email or web address]. By joining [email or web address]’s FollowMyHealth portal, you will also be able to view your health information using other applications (apps) compatible with our system.

## 2023-09-01 NOTE — DIETITIAN INITIAL EVALUATION ADULT - NS FNS DIET ORDER
Consistent Carbohydrate {Evening Snack}  DASH/TLC {Sodium & Cholesterol Restricted}  No Concentrated Potassium  Low Sodium  No Concentrated Phosphorus

## 2023-09-01 NOTE — PROGRESS NOTE ADULT - PROBLEM SELECTOR PLAN 5
resolved  -bowel regimen  -bladder scan q8h  -c/w tamsulosin appreciate pulm input  start symbicort  will f/u outpatient with Pulm appreciate pulm input  c/w symbicort  will f/u outpatient with Pulm

## 2023-09-01 NOTE — DISCHARGE NOTE PROVIDER - CARE PROVIDERS DIRECT ADDRESSES
,[email or web address]

## 2023-09-01 NOTE — DISCHARGE NOTE PROVIDER - NSDCCPCAREPLAN_GEN_ALL_CORE_FT
PRINCIPAL DISCHARGE DIAGNOSIS  Diagnosis: Shortness of breath  Assessment and Plan of Treatment: You have been scheduled for a virtual Pulmonology appointment with Dr. Lisker on 9/5/23 at 9AM. At the time of your appointment. youll receive a text/email invite for your telehealthl session. Please click on the link, enter your name and then you'll be redirected to a virtual waiting room where youll wait until the doctor connects with you.     PRINCIPAL DISCHARGE DIAGNOSIS  Diagnosis: Shortness of breath  Assessment and Plan of Treatment: we think this was because of your new diagnosis of pulmonary fibrosis. It may also be because your heart rate was fast (Afib). you are feeling much better now      SECONDARY DISCHARGE DIAGNOSES  Diagnosis: Chest pain  Assessment and Plan of Treatment: You were seen by cardiology and had a stress test which showed ***. Please follow up with your cardiologist    Diagnosis: Pulmonary fibrosis  Assessment and Plan of Treatment: You have been scheduled for a virtual Pulmonology appointment with Dr. Lisker on 9/5/23 at 9AM. At the time of your appointment. youll receive a text/email invite for your telehealth. session. Please click on the link, enter your name and then you'll be redirected to a virtual waiting room where youll wait until the doctor connects with you.    Diagnosis: RUPA (acute kidney injury)  Assessment and Plan of Treatment: You kidney function has improved to closer to a normal range by the time of discharge. Your sodium is completely normal. Please resume your Cozaar (losartan) tomorrow at half the dose. follow up with your primary care doctor to see if it needs to be increased again    Diagnosis: Afib  Assessment and Plan of Treatment: You were diagnosed with Afib, which is an irregular heart rhythm. You are back in normal heart rhthym now (sinus). However, the recommendation is still that you remain on a blood thinner (Eliquis) to decrease your risk of stroke. Please follow up with the cardiology doctors and rhthym doctors (EP) as outaptient. Please also continue with new medicine called metoprolol.    Diagnosis: DM2 (diabetes mellitus, type 2)  Assessment and Plan of Treatment: Please STOP taking metformin until you see your primary care doctor. Continue with Januvia. We want to recheck your labs before we resume metformin and make sure your kidney function has improved     PRINCIPAL DISCHARGE DIAGNOSIS  Diagnosis: Shortness of breath  Assessment and Plan of Treatment: we think this was because of your new diagnosis of pulmonary fibrosis. It may also be because your heart rate was fast (Afib). you are feeling much better now      SECONDARY DISCHARGE DIAGNOSES  Diagnosis: Chest pain  Assessment and Plan of Treatment: You were seen by cardiology and had a stress test which was thankfully negative.  Please follow up with your cardiologist    Diagnosis: Pulmonary fibrosis  Assessment and Plan of Treatment: You have been scheduled for a virtual Pulmonology appointment with Dr. Lisker on 9/5/23 at 9AM. At the time of your appointment. youll receive a text/email invite for your telehealth. session. Please click on the link, enter your name and then you'll be redirected to a virtual waiting room where youll wait until the doctor connects with you.    Diagnosis: RUPA (acute kidney injury)  Assessment and Plan of Treatment: You kidney function has improved to closer to a normal range by the time of discharge. Your sodium is completely normal. Please resume your Cozaar (losartan) tomorrow at half the dose. follow up with your primary care doctor to see if it needs to be increased again    Diagnosis: Afib  Assessment and Plan of Treatment: You were diagnosed with Afib, which is an irregular heart rhythm. You are back in normal heart rhthym now (sinus). However, the recommendation is still that you remain on a blood thinner (Eliquis) to decrease your risk of stroke. Please follow up with the cardiology doctors and rhthym doctors (EP) as outaptient. Please also continue with new medicine called metoprolol.    Diagnosis: DM2 (diabetes mellitus, type 2)  Assessment and Plan of Treatment: Please STOP taking metformin until you see your primary care doctor. Continue with Januvia. We want to recheck your labs before we resume metformin and make sure your kidney function has improved

## 2023-09-01 NOTE — PROGRESS NOTE ADULT - PROBLEM SELECTOR PLAN 7
-LA 2.2 on admission   -now normalized  -no other infectious prodrome  -stop antibiotics resolved  -bowel regimen  -bladder scan q8h  -c/w tamsulosin

## 2023-09-01 NOTE — DISCHARGE NOTE PROVIDER - NSDCFUADDAPPT_GEN_ALL_CORE_FT
Please see your PCP early next week. You should recheck a basic metabolic panel when you see your PCP to follow up on your kidney function and see if your metformin can be resumed!

## 2023-09-01 NOTE — DISCHARGE NOTE NURSING/CASE MANAGEMENT/SOCIAL WORK - NSDCPEFALRISK_GEN_ALL_CORE
For information on Fall & Injury Prevention, visit: [email or web address] OR  [email or web address] OR  https://www.cdc.gov/steadi/patient.html

## 2023-09-01 NOTE — PROGRESS NOTE ADULT - PROBLEM SELECTOR PLAN 3
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 could have also been from Afib with RVR  CT chest shows signs of pulm fibrosis  appreciate pulm input, f/u outpatient most likely from pulm fibrosis. could have also been from Afib with RVR  CT chest shows signs of pulm fibrosis  appreciate pulm input, f/u outpatient-emailed for outpatient appt

## 2023-09-01 NOTE — PROGRESS NOTE ADULT - PROBLEM SELECTOR PLAN 4
-Cr is 1.58, unknown baseline, improving with fluids  -no longer retaining, c/w BPH meds  -FLC ratio is WNL given renal impairment. There does seem to be a predominance of IgG. 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

## 2023-09-01 NOTE — PROGRESS NOTE ADULT - ATTENDING COMMENTS
Patient with history of COPD, admitted for 2 weeks of intermittent chest pain with dyspnea on exertion, decreased urine output, found to have RUPA, A-fib with RVR.  CT chest with radiographic findings suggestive of UIP, not previously diagnosed.  Continue Symbicort, albuterol as needed, undiagnosed ILD, pending serologies to evaluate for connective tissue disease.  Patient not hypoxemic or with signs of infection or ILD exacerbation.  Needs follow-up with pulmonary as outpatient. pulmonary signing off, reconsult as needed.

## 2023-09-01 NOTE — PROGRESS NOTE ADULT - REASON FOR ADMISSION
NSTEMI, renal failure, and CHF
Problem: Pain  Goal: Verbalizes/displays adequate comfort level or baseline comfort level  Outcome: Progressing  Flowsheets (Taken 10/18/2022 0014)  Verbalizes/displays adequate comfort level or baseline comfort level: Assess pain using appropriate pain scale     Problem: Postpartum  Goal: Experiences normal postpartum course  Description:  Postpartum OB-Pregnancy care plan goal which identifies if the mother is experiencing a normal postpartum course  Outcome: Progressing     Problem: Postpartum  Goal: Appropriate maternal -  bonding  Description:  Postpartum OB-Pregnancy care plan goal which identifies if the mother and  are bonding appropriately  Outcome: Progressing     Problem: Postpartum  Goal: Establishment of infant feeding pattern  Description:  Postpartum OB-Pregnancy care plan goal which identifies if the mother is establishing a feeding pattern with their   Outcome: Progressing     Problem: Postpartum  Goal: Incisions, wounds, or drain sites healing without S/S of infection  Outcome: Progressing     Problem: Infection - Adult  Goal: Absence of infection at discharge  Outcome: Progressing     Problem: Infection - Adult  Goal: Absence of infection during hospitalization  Outcome: Progressing     Problem: Infection - Adult  Goal: Absence of fever/infection during anticipated neutropenic period  Outcome: Progressing     Problem: Safety - Adult  Goal: Free from fall injury  Outcome: Progressing     Problem: Chronic Conditions and Co-morbidities  Goal: Patient's chronic conditions and co-morbidity symptoms are monitored and maintained or improved  Outcome: Progressing
NSTEMI, renal failure, and CHF

## 2023-09-01 NOTE — DISCHARGE NOTE PROVIDER - NSDCMRMEDTOKEN_GEN_ALL_CORE_FT
Albuterol (Eqv-ProAir HFA) 90 mcg/inh inhalation aerosol: 2 puff(s) inhaled 4 times a day as needed for  bronchospasm  amLODIPine 2.5 mg oral tablet: 1 tab(s) orally once a day  Cozaar 100 mg oral tablet: 1 tab(s) orally once a day  glimepiride 2 mg oral tablet: 1 tab(s) orally 2 times a day  Jardiance 10 mg oral tablet: 1 tab(s) orally once a day  Lipitor 10 mg oral tablet: 1 tab(s) orally once a day  metFORMIN 1000 mg oral tablet: 1 tab(s) orally once a day (at bedtime)  metFORMIN 500 mg oral tablet: 1 tab(s) orally once a day  montelukast 10 mg oral tablet: 1 tab(s) orally once a day   albuterol 90 mcg/inh inhalation aerosol: 2 puff(s) inhaled every 6 hours As needed for bronchospasm  apixaban 5 mg oral tablet: 1 tab(s) orally 2 times a day  atorvastatin 10 mg oral tablet: 1 tab(s) orally once a day (at bedtime)  budesonide-formoterol 80 mcg-4.5 mcg/inh inhalation aerosol: 2 puff(s) inhaled 2 times a day  Cozaar 100 mg oral tablet: 0.5 tab(s) orally once a day Take  half tablet (50mg) daily  starting 9/2/23  glimepiride 2 mg oral tablet: 1 tab(s) orally 2 times a day  Jardiance 10 mg oral tablet: 1 tab(s) orally once a day  metoprolol tartrate 25 mg oral tablet: 1 tab(s) orally every 12 hours  montelukast 10 mg oral tablet: 1 tab(s) orally once a day  tamsulosin 0.4 mg oral capsule: 1 cap(s) orally once a day (at bedtime)

## 2023-09-01 NOTE — PROGRESS NOTE ADULT - ASSESSMENT
Mr. Liao is a 87 year old M with history of DM2 (non-insulin dependent, HTN, HLD. COPD who presents with progressive chest pressure over the last few weeks. Patient has dyspnea at rest for the last week likely 2/2 to new diagnosis of pulm fibrosis. Stay complicated by Afib with RVR, new onset, now self resolved and back in sinus

## 2023-09-01 NOTE — DISCHARGE NOTE PROVIDER - PROVIDER TOKENS
PROVIDER:[TOKEN:[[id number]],SCHEDULEDAPPT:[09/05/2023],SCHEDULEDAPPTTIME:[09:00 AM]] PROVIDER:[TOKEN:[[id number]],SCHEDULEDAPPT:[09/05/2023],SCHEDULEDAPPTTIME:[09:00 AM]],PROVIDER:[TOKEN:[[id number]],FOLLOWUP:[1 week]]

## 2023-09-02 LAB — DSDNA AB SER-ACNC: <12 IU/ML — SIGNIFICANT CHANGE UP

## 2023-09-02 RX ORDER — LOSARTAN POTASSIUM 100 MG/1
1 TABLET, FILM COATED ORAL
Qty: 30 | Refills: 0
Start: 2023-09-02 | End: 2023-10-01

## 2023-09-02 RX ORDER — TAMSULOSIN HYDROCHLORIDE 0.4 MG/1
1 CAPSULE ORAL
Qty: 30 | Refills: 0
Start: 2023-09-02 | End: 2023-10-01

## 2023-09-03 ENCOUNTER — TRANSCRIPTION ENCOUNTER (OUTPATIENT)
Age: 88
End: 2023-09-03

## 2023-09-03 PROCEDURE — 93308 TTE F-UP OR LMTD: CPT | Mod: 26

## 2023-09-04 LAB
CULTURE RESULTS: SIGNIFICANT CHANGE UP
IGA FLD-MCNC: 571 MG/DL — HIGH (ref 84–499)
IGG FLD-MCNC: 1856 MG/DL — HIGH (ref 610–1660)
IGM SERPL-MCNC: 46 MG/DL — SIGNIFICANT CHANGE UP (ref 35–242)
KAPPA LC SER QL IFE: 11.31 MG/DL — HIGH (ref 0.33–1.94)
KAPPA/LAMBDA FREE LIGHT CHAIN RATIO, SERUM: 2.55 RATIO — HIGH (ref 0.26–1.65)
LAMBDA LC SER QL IFE: 4.43 MG/DL — HIGH (ref 0.57–2.63)
SPECIMEN SOURCE: SIGNIFICANT CHANGE UP

## 2023-09-05 ENCOUNTER — TRANSCRIPTION ENCOUNTER (OUTPATIENT)
Age: 88
End: 2023-09-05

## 2023-09-05 ENCOUNTER — APPOINTMENT (OUTPATIENT)
Dept: PULMONOLOGY | Facility: CLINIC | Age: 88
End: 2023-09-05
Payer: MEDICARE

## 2023-09-05 DIAGNOSIS — J84.10 PULMONARY FIBROSIS, UNSPECIFIED: ICD-10-CM

## 2023-09-05 DIAGNOSIS — I48.91 UNSPECIFIED ATRIAL FIBRILLATION: ICD-10-CM

## 2023-09-05 DIAGNOSIS — Z86.39 PERSONAL HISTORY OF OTHER ENDOCRINE, NUTRITIONAL AND METABOLIC DISEASE: ICD-10-CM

## 2023-09-05 LAB — ALDOLASE SERPL-CCNC: 5.1 U/L — SIGNIFICANT CHANGE UP (ref 3.3–10.3)

## 2023-09-05 PROCEDURE — 99496 TRANSJ CARE MGMT HIGH F2F 7D: CPT | Mod: 95

## 2023-09-05 RX ORDER — ALBUTEROL SULFATE 90 UG/1
108 (90 BASE) INHALANT RESPIRATORY (INHALATION)
Qty: 1 | Refills: 1 | Status: ACTIVE | COMMUNITY
Start: 2023-09-05 | End: 1900-01-01

## 2023-09-05 NOTE — HISTORY OF PRESENT ILLNESS
[Home] : at home, [unfilled] , at the time of the visit. [Medical Office: ([facility])___] : at the medical office located in  [Family Member] : family member [Verbal consent obtained from patient] : the patient, [unfilled] [[facility]] : Post-hospitalization from [facility] [ILD (interstitial lung disease)] : ILD (interstitial lung disease) [Other: _____] : [unfilled] [Yes] : Yes [Admitted on: ___] : The patient was admitted on [unfilled] [Discharged on ___] : discharged on [unfilled] [Discharge Summary] : discharge summary [Pertinent Labs] : pertinent labs [Radiology Findings] : radiology findings [Discharge Med List] : discharge medication list [Med Reconciliation] : medication reconciliation has been completed [Patient Contacted By: ____] : and contacted by [unfilled] [FreeTextEntry2] : Post hospital discharge telehealth visit.  Patient's son, a mild, present as well.  Patient's son cell phone use, [phone].  87-year-old male with past medical history of known diabetes and hypertension.  Patient was sent in for admission to [facility] from his cardiologist office, Dr. Anthony Simons, for an abnormal EKG, chest pain.  In the hospital patient was ruled out for an MI.  He was found to be in new onset atrial fibrillation with RVR, hypotension.  RUPA secondary to volume depletion.  He was treated with IV fluids.  And started on a beta-blocker and anticoagulation.  Patient was also noted to have evidence of pulmonary fibrosis on CT of his chest, as well as a small pericardial effusion.  Not hypoxic.  He did not require oxygen at discharge.  Echo had showed diastolic dysfunction, possible aortic necessary they said if required more information.  He was discharged on Friday prior to the Labor Day weekend.  He was in sinus rhythm at the time of discharge.  Discharged with Eliquis.  Also started on Symbicort.  Montelukast  Patient's pain has resolved.  Was actually described as chest tightness.  It has not recurred since coming home. He has noticed some dyspnea with exertion over the past few months things like walking upstairs.  He is a former smoker but he quit 30 years ago.  At that time had pneumonia.  He was born in [place].  Worked in the [employer], without occupational exposures specifically.  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

## 2023-09-05 NOTE — PHYSICAL EXAM
[No Respiratory Distress] : no respiratory distress  [No Accessory Muscle Use] : no accessory muscle use [Normal] : affect was normal and insight and judgment were intact [05822 - High Complexity requires an extensive number of possible diagnoses and/or the management options, extensive complexity of the medical data (tests, etc.) to be reviewed, and a high risk of significant complications, morbidity, and/or mortality as w] : High Complexity

## 2023-09-05 NOTE — ASSESSMENT
[FreeTextEntry1] : [id number] [place] male, former smoker quit 30 years ago, diabetes and hypertension, sent in to [facility] for admission with chest tightness and abnormal EKG.  New onset atrial fibrillation, with episode of RVR and hypertension.  CT chest also with noted some pulmonary fibrosis.  Not hypoxic.  Patient was treated with IV fluids, beta-blocker, statin anticoagulation.  Improved.  Discharged with Eliquis.  Metoprolol.  Also discharged with mood and as needed albuterol.  Has been on montelukast though unclear why.  Remote tobacco history quit 30 years ago. I suspect his symptoms were related to the new onset A-fib.  Pulmonary fibrosis would not cause intermittent chest tightness and pain.  Symptoms improved after he was reverted back to sinus rhythm which occurred before he left the hospital.  Patient advised to follow-up with his cardiologist.  He will also follow-up with his primary care physician.  We will plan for in person visit with a pulmonary function test.  Also referring for home care services postdischarge.  PMD - [facility] - Anthony Simons

## 2023-09-06 PROBLEM — E11.9 TYPE 2 DIABETES MELLITUS WITHOUT COMPLICATIONS: Chronic | Status: ACTIVE | Noted: 2023-08-29

## 2023-09-06 LAB
CULTURE RESULTS: SIGNIFICANT CHANGE UP
SPECIMEN SOURCE: SIGNIFICANT CHANGE UP

## 2023-09-08 LAB
% ALBUMIN: SIGNIFICANT CHANGE UP %
% ALPHA 1: SIGNIFICANT CHANGE UP %
% ALPHA 2: SIGNIFICANT CHANGE UP %
% BETA: SIGNIFICANT CHANGE UP %
% GAMMA, URINE: PRESENT
% GAMMA: SIGNIFICANT CHANGE UP %
ALBUMIN 24H MFR UR ELPH: SIGNIFICANT CHANGE UP
ALBUMIN SERPL ELPH-MCNC: SIGNIFICANT CHANGE UP G/DL (ref 3.3–4.4)
ALPHA1 GLOB 24H MFR UR ELPH: SIGNIFICANT CHANGE UP
ALPHA1 GLOB SERPL ELPH-MCNC: SIGNIFICANT CHANGE UP G/DL (ref 0.1–0.3)
ALPHA2 GLOB 24H MFR UR ELPH: SIGNIFICANT CHANGE UP
ALPHA2 GLOB SERPL ELPH-MCNC: SIGNIFICANT CHANGE UP G/DL (ref 0.6–1)
B-GLOBULIN 24H MFR UR ELPH: SIGNIFICANT CHANGE UP
B-GLOBULIN SERPL ELPH-MCNC: SIGNIFICANT CHANGE UP G/DL (ref 0.6–1.1)
GAMMA GLOBULIN: SIGNIFICANT CHANGE UP G/DL (ref 0.7–1.7)
INTERPRETATION 24H UR IFE-IMP: SIGNIFICANT CHANGE UP
INTERPRETATION 24H UR IFE-IMP: SIGNIFICANT CHANGE UP
INTERPRETATION SERPL IFE-IMP: SIGNIFICANT CHANGE UP
M PROTEIN 24H UR ELPH-MRATE: SIGNIFICANT CHANGE UP
PROT ?TM UR-MCNC: 8 MG/DL — SIGNIFICANT CHANGE UP
PROT PATTERN 24H UR ELPH-IMP: SIGNIFICANT CHANGE UP
PROT PATTERN SERPL ELPH-IMP: SIGNIFICANT CHANGE UP
PROT SERPL-MCNC: 9 G/DL — SIGNIFICANT CHANGE UP

## 2023-09-13 ENCOUNTER — APPOINTMENT (OUTPATIENT)
Dept: PULMONOLOGY | Facility: CLINIC | Age: 88
End: 2023-09-13

## 2023-09-15 ENCOUNTER — TRANSCRIPTION ENCOUNTER (OUTPATIENT)
Age: 88
End: 2023-09-15

## 2023-09-15 ENCOUNTER — EMERGENCY (EMERGENCY)
Facility: HOSPITAL | Age: 88
LOS: 1 days | Discharge: HOME | End: 2023-09-15
Attending: EMERGENCY MEDICINE | Admitting: EMERGENCY MEDICINE
Payer: MEDICARE

## 2023-09-15 VITALS
TEMPERATURE: 98 F | SYSTOLIC BLOOD PRESSURE: 124 MMHG | HEART RATE: 76 BPM | OXYGEN SATURATION: 100 % | DIASTOLIC BLOOD PRESSURE: 70 MMHG | HEIGHT: 66 IN | RESPIRATION RATE: 16 BRPM

## 2023-09-15 VITALS
TEMPERATURE: 98 F | HEART RATE: 74 BPM | OXYGEN SATURATION: 100 % | SYSTOLIC BLOOD PRESSURE: 119 MMHG | RESPIRATION RATE: 16 BRPM | DIASTOLIC BLOOD PRESSURE: 73 MMHG

## 2023-09-15 LAB
ALBUMIN SERPL ELPH-MCNC: 3.1 G/DL — LOW (ref 3.3–5)
ALP SERPL-CCNC: 55 U/L — SIGNIFICANT CHANGE UP (ref 40–120)
ALT FLD-CCNC: 25 U/L — SIGNIFICANT CHANGE UP (ref 4–41)
ANION GAP SERPL CALC-SCNC: 9 MMOL/L — SIGNIFICANT CHANGE UP (ref 7–14)
APTT BLD: 28.2 SEC — SIGNIFICANT CHANGE UP (ref 24.5–35.6)
AST SERPL-CCNC: 14 U/L — SIGNIFICANT CHANGE UP (ref 4–40)
BILIRUB SERPL-MCNC: 0.3 MG/DL — SIGNIFICANT CHANGE UP (ref 0.2–1.2)
BUN SERPL-MCNC: 37 MG/DL — HIGH (ref 7–23)
CALCIUM SERPL-MCNC: 9.5 MG/DL — SIGNIFICANT CHANGE UP (ref 8.4–10.5)
CHLORIDE SERPL-SCNC: 96 MMOL/L — LOW (ref 98–107)
CO2 SERPL-SCNC: 24 MMOL/L — SIGNIFICANT CHANGE UP (ref 22–31)
CREAT SERPL-MCNC: 1.21 MG/DL — SIGNIFICANT CHANGE UP (ref 0.5–1.3)
EGFR: 58 ML/MIN/1.73M2 — LOW
GLUCOSE SERPL-MCNC: 148 MG/DL — HIGH (ref 70–99)
HCT VFR BLD CALC: 40.1 % — SIGNIFICANT CHANGE UP (ref 39–50)
HGB BLD-MCNC: 13.2 G/DL — SIGNIFICANT CHANGE UP (ref 13–17)
INR BLD: 1.32 RATIO — HIGH (ref 0.85–1.18)
MAGNESIUM SERPL-MCNC: 2.4 MG/DL — SIGNIFICANT CHANGE UP (ref 1.6–2.6)
MCHC RBC-ENTMCNC: 29.2 PG — SIGNIFICANT CHANGE UP (ref 27–34)
MCHC RBC-ENTMCNC: 32.9 GM/DL — SIGNIFICANT CHANGE UP (ref 32–36)
MCV RBC AUTO: 88.7 FL — SIGNIFICANT CHANGE UP (ref 80–100)
NRBC # BLD: 0 /100 WBCS — SIGNIFICANT CHANGE UP (ref 0–0)
NRBC # FLD: 0 K/UL — SIGNIFICANT CHANGE UP (ref 0–0)
PHOSPHATE SERPL-MCNC: 4 MG/DL — SIGNIFICANT CHANGE UP (ref 2.5–4.5)
PLATELET # BLD AUTO: 444 K/UL — HIGH (ref 150–400)
POTASSIUM SERPL-MCNC: 4.9 MMOL/L — SIGNIFICANT CHANGE UP (ref 3.5–5.3)
POTASSIUM SERPL-SCNC: 4.9 MMOL/L — SIGNIFICANT CHANGE UP (ref 3.5–5.3)
PROT SERPL-MCNC: 7.5 G/DL — SIGNIFICANT CHANGE UP (ref 6–8.3)
PROTHROM AB SERPL-ACNC: 14.8 SEC — HIGH (ref 9.5–13)
RBC # BLD: 4.52 M/UL — SIGNIFICANT CHANGE UP (ref 4.2–5.8)
RBC # FLD: 13 % — SIGNIFICANT CHANGE UP (ref 10.3–14.5)
SODIUM SERPL-SCNC: 129 MMOL/L — LOW (ref 135–145)
TROPONIN T, HIGH SENSITIVITY RESULT: 25 NG/L — SIGNIFICANT CHANGE UP
WBC # BLD: 16.97 K/UL — HIGH (ref 3.8–10.5)
WBC # FLD AUTO: 16.97 K/UL — HIGH (ref 3.8–10.5)

## 2023-09-15 PROCEDURE — 71046 X-RAY EXAM CHEST 2 VIEWS: CPT | Mod: 26

## 2023-09-15 PROCEDURE — 93010 ELECTROCARDIOGRAM REPORT: CPT

## 2023-09-15 PROCEDURE — 99284 EMERGENCY DEPT VISIT MOD MDM: CPT

## 2023-09-15 NOTE — ED PROVIDER NOTE - PATIENT PORTAL LINK FT
You can access the FollowMyHealth Patient Portal offered by [facility] by registering at the following website: [email or web address]. By joining [facility]’s FollowMyHealth portal, you will also be able to view your health information using other applications (apps) compatible with our system.

## 2023-09-15 NOTE — ED PROVIDER NOTE - OBJECTIVE STATEMENT
87 year old male pmhx Afib on eliquis, pulm fibrosis, HTN, DM, HCL, presents after an episode of palpitations/rapid heart rate and some SOB at 6pm today. Pt was on the toilet and turned suddenly when he began feeling his heart race. Pt was recently admitted and diagnosed with Afib and Pulmonary fibrosis 2 weeks ago. Since discharge he has had a few episodes of palpitations typically after exerting himself. Pt denies any chest pain, pressure or tightness. Pt denies any dizziness, edema or syncope.

## 2023-09-15 NOTE — ED PROVIDER NOTE - NSICDXPASTMEDICALHX_GEN_ALL_CORE_FT
PAST MEDICAL HISTORY:  Afib     COPD (chronic obstructive pulmonary disease)     DM (diabetes mellitus)     Former smoker     HLD (hyperlipidemia)     HTN (hypertension)     Pulmonary fibrosis

## 2023-09-15 NOTE — ED PROVIDER NOTE - CLINICAL SUMMARY MEDICAL DECISION MAKING FREE TEXT BOX
87 year old male Hx of Afib on eliquis, pulmonary fibrosis, HTN, DM, HLD presenting after an episode of palpitations, tachycardia and associated SOB at 6pm today. Pt was diagnosed with Afib 2 weeks ago and was admitted to the hospital. Due to history and risk factors plan is to do cardiac workup, CXR, EKG, Troponin as well as CBC, CMP. Pending normal results pt plan will be to have pt follow up with his cardiologist Dr. Humphrey outpatient.

## 2023-09-15 NOTE — ED ADULT NURSE NOTE - OBJECTIVE STATEMENT
Received the patient in room 23 with c/o an episode of  palpitation and SOB lasted for 2-3 mins. Son called EMS for evaluation. Alert and oriented x 4, safety maintained. Sinus rhythm on cardiac monitor. Respirations are even and non labored. 20 G IV line inserted in left fore arm. Due labs sent. will continue to monitor.

## 2023-09-15 NOTE — ED ADULT TRIAGE NOTE - CHIEF COMPLAINT QUOTE
Pt c/o palpitations, SOB at 5pm. Pt states  symptoms lasted approx 15 min then resolved. pt told his son when he got home and called 911. No complaints of chest pain, headache, nausea, dizziness, vomiting  SOB, fever, chills verbalized.

## 2023-09-15 NOTE — ED PROVIDER NOTE - NSFOLLOWUPINSTRUCTIONS_ED_ALL_ED_FT
Palpitations    A palpitation is the feeling that your heartbeat is irregular or is faster than normal. It may feel like your heart is fluttering or skipping a beat. They may be caused by many things, including smoking, caffeine, alcohol, stress, and certain medicines. Although most causes of palpitations are not serious, palpitations can be a sign of a serious medical problem. Avoid caffeine, alcohol, and tobacco products at home. Try to reduce stress and anxiety and make sure to get plenty of rest.     - Continue all medications as prescribed.   - Follow up with your CARDIOLOGIST in next week.     SEEK IMMEDIATE MEDICAL CARE IF YOU HAVE ANY OF THE FOLLOWING SYMPTOMS: chest pain, shortness of breath, severe headache, dizziness/lightheadedness, or fainting.

## 2023-09-15 NOTE — ED PROVIDER NOTE - PROGRESS NOTE DETAILS
Lab work, ECG and CXR all discussed with pt, explained that all were within normal limits. It was advised that pt should follow up with his cardiologist outpatient within the next week and that he should return in the case of any worsening symptoms. Pt understood and agreed to the plan. - Carmen Warren MS4

## 2023-09-18 PROBLEM — J84.10 PULMONARY FIBROSIS, UNSPECIFIED: Chronic | Status: ACTIVE | Noted: 2023-09-15

## 2023-09-18 PROBLEM — I48.91 UNSPECIFIED ATRIAL FIBRILLATION: Chronic | Status: ACTIVE | Noted: 2023-09-15

## 2023-09-20 ENCOUNTER — NON-APPOINTMENT (OUTPATIENT)
Age: 88
End: 2023-09-20

## 2023-09-20 ENCOUNTER — INPATIENT (INPATIENT)
Facility: HOSPITAL | Age: 88
LOS: 3 days | Discharge: HOME HEALTH | End: 2023-09-24
Attending: INTERNAL MEDICINE | Admitting: INTERNAL MEDICINE
Payer: MEDICARE

## 2023-09-20 VITALS
OXYGEN SATURATION: 97 % | RESPIRATION RATE: 18 BRPM | TEMPERATURE: 98 F | HEIGHT: 66 IN | DIASTOLIC BLOOD PRESSURE: 52 MMHG | HEART RATE: 75 BPM | SYSTOLIC BLOOD PRESSURE: 79 MMHG

## 2023-09-20 DIAGNOSIS — R06.09 OTHER FORMS OF DYSPNEA: ICD-10-CM

## 2023-09-20 DIAGNOSIS — J84.9 INTERSTITIAL PULMONARY DISEASE, UNSPECIFIED: ICD-10-CM

## 2023-09-20 DIAGNOSIS — J96.01 ACUTE RESPIRATORY FAILURE WITH HYPOXIA: ICD-10-CM

## 2023-09-20 DIAGNOSIS — U07.1 COVID-19: ICD-10-CM

## 2023-09-20 DIAGNOSIS — I48.20 CHRONIC ATRIAL FIBRILLATION, UNSPECIFIED: ICD-10-CM

## 2023-09-20 DIAGNOSIS — E87.1 HYPO-OSMOLALITY AND HYPONATREMIA: ICD-10-CM

## 2023-09-20 DIAGNOSIS — E11.9 TYPE 2 DIABETES MELLITUS WITHOUT COMPLICATIONS: ICD-10-CM

## 2023-09-20 DIAGNOSIS — Z29.9 ENCOUNTER FOR PROPHYLACTIC MEASURES, UNSPECIFIED: ICD-10-CM

## 2023-09-20 LAB
ALBUMIN SERPL ELPH-MCNC: 3 G/DL — LOW (ref 3.3–5)
ALP SERPL-CCNC: 56 U/L — SIGNIFICANT CHANGE UP (ref 40–120)
ALT FLD-CCNC: 21 U/L — SIGNIFICANT CHANGE UP (ref 4–41)
ANION GAP SERPL CALC-SCNC: 9 MMOL/L — SIGNIFICANT CHANGE UP (ref 7–14)
APTT BLD: 30 SEC — SIGNIFICANT CHANGE UP (ref 24.5–35.6)
AST SERPL-CCNC: 14 U/L — SIGNIFICANT CHANGE UP (ref 4–40)
B PERT DNA SPEC QL NAA+PROBE: SIGNIFICANT CHANGE UP
B PERT+PARAPERT DNA PNL SPEC NAA+PROBE: SIGNIFICANT CHANGE UP
BASOPHILS # BLD AUTO: 0.01 K/UL — SIGNIFICANT CHANGE UP (ref 0–0.2)
BASOPHILS NFR BLD AUTO: 0.1 % — SIGNIFICANT CHANGE UP (ref 0–2)
BILIRUB SERPL-MCNC: 0.6 MG/DL — SIGNIFICANT CHANGE UP (ref 0.2–1.2)
BORDETELLA PARAPERTUSSIS (RAPRVP): SIGNIFICANT CHANGE UP
BUN SERPL-MCNC: 28 MG/DL — HIGH (ref 7–23)
C PNEUM DNA SPEC QL NAA+PROBE: SIGNIFICANT CHANGE UP
CALCIUM SERPL-MCNC: 8.5 MG/DL — SIGNIFICANT CHANGE UP (ref 8.4–10.5)
CHLORIDE SERPL-SCNC: 97 MMOL/L — LOW (ref 98–107)
CO2 SERPL-SCNC: 23 MMOL/L — SIGNIFICANT CHANGE UP (ref 22–31)
CREAT SERPL-MCNC: 1.2 MG/DL — SIGNIFICANT CHANGE UP (ref 0.5–1.3)
EGFR: 59 ML/MIN/1.73M2 — LOW
EOSINOPHIL # BLD AUTO: 0.01 K/UL — SIGNIFICANT CHANGE UP (ref 0–0.5)
EOSINOPHIL NFR BLD AUTO: 0.1 % — SIGNIFICANT CHANGE UP (ref 0–6)
FLUAV SUBTYP SPEC NAA+PROBE: SIGNIFICANT CHANGE UP
FLUBV RNA SPEC QL NAA+PROBE: SIGNIFICANT CHANGE UP
GLUCOSE SERPL-MCNC: 169 MG/DL — HIGH (ref 70–99)
HADV DNA SPEC QL NAA+PROBE: SIGNIFICANT CHANGE UP
HCOV 229E RNA SPEC QL NAA+PROBE: SIGNIFICANT CHANGE UP
HCOV HKU1 RNA SPEC QL NAA+PROBE: SIGNIFICANT CHANGE UP
HCOV NL63 RNA SPEC QL NAA+PROBE: SIGNIFICANT CHANGE UP
HCOV OC43 RNA SPEC QL NAA+PROBE: SIGNIFICANT CHANGE UP
HCT VFR BLD CALC: 39.5 % — SIGNIFICANT CHANGE UP (ref 39–50)
HGB BLD-MCNC: 12.5 G/DL — LOW (ref 13–17)
HMPV RNA SPEC QL NAA+PROBE: SIGNIFICANT CHANGE UP
HPIV1 RNA SPEC QL NAA+PROBE: SIGNIFICANT CHANGE UP
HPIV2 RNA SPEC QL NAA+PROBE: SIGNIFICANT CHANGE UP
HPIV3 RNA SPEC QL NAA+PROBE: SIGNIFICANT CHANGE UP
HPIV4 RNA SPEC QL NAA+PROBE: SIGNIFICANT CHANGE UP
IANC: 12.33 K/UL — HIGH (ref 1.8–7.4)
IMM GRANULOCYTES NFR BLD AUTO: 0.6 % — SIGNIFICANT CHANGE UP (ref 0–0.9)
INR BLD: 1.79 RATIO — HIGH (ref 0.85–1.18)
LYMPHOCYTES # BLD AUTO: 1.04 K/UL — SIGNIFICANT CHANGE UP (ref 1–3.3)
LYMPHOCYTES # BLD AUTO: 7.1 % — LOW (ref 13–44)
M PNEUMO DNA SPEC QL NAA+PROBE: SIGNIFICANT CHANGE UP
MCHC RBC-ENTMCNC: 29 PG — SIGNIFICANT CHANGE UP (ref 27–34)
MCHC RBC-ENTMCNC: 31.6 GM/DL — LOW (ref 32–36)
MCV RBC AUTO: 91.6 FL — SIGNIFICANT CHANGE UP (ref 80–100)
MONOCYTES # BLD AUTO: 1.08 K/UL — HIGH (ref 0–0.9)
MONOCYTES NFR BLD AUTO: 7.4 % — SIGNIFICANT CHANGE UP (ref 2–14)
NEUTROPHILS # BLD AUTO: 12.33 K/UL — HIGH (ref 1.8–7.4)
NEUTROPHILS NFR BLD AUTO: 84.7 % — HIGH (ref 43–77)
NRBC # BLD: 0 /100 WBCS — SIGNIFICANT CHANGE UP (ref 0–0)
NRBC # FLD: 0 K/UL — SIGNIFICANT CHANGE UP (ref 0–0)
PLATELET # BLD AUTO: 353 K/UL — SIGNIFICANT CHANGE UP (ref 150–400)
POTASSIUM SERPL-MCNC: 4.9 MMOL/L — SIGNIFICANT CHANGE UP (ref 3.5–5.3)
POTASSIUM SERPL-SCNC: 4.9 MMOL/L — SIGNIFICANT CHANGE UP (ref 3.5–5.3)
PROT SERPL-MCNC: 7.7 G/DL — SIGNIFICANT CHANGE UP (ref 6–8.3)
PROTHROM AB SERPL-ACNC: 19.7 SEC — HIGH (ref 9.5–13)
RAPID RVP RESULT: DETECTED
RBC # BLD: 4.31 M/UL — SIGNIFICANT CHANGE UP (ref 4.2–5.8)
RBC # FLD: 13.2 % — SIGNIFICANT CHANGE UP (ref 10.3–14.5)
RSV RNA SPEC QL NAA+PROBE: SIGNIFICANT CHANGE UP
RV+EV RNA SPEC QL NAA+PROBE: SIGNIFICANT CHANGE UP
SARS-COV-2 RNA SPEC QL NAA+PROBE: DETECTED
SODIUM SERPL-SCNC: 129 MMOL/L — LOW (ref 135–145)
TROPONIN T, HIGH SENSITIVITY RESULT: 43 NG/L — SIGNIFICANT CHANGE UP
TROPONIN T, HIGH SENSITIVITY RESULT: 44 NG/L — SIGNIFICANT CHANGE UP
WBC # BLD: 14.56 K/UL — HIGH (ref 3.8–10.5)
WBC # FLD AUTO: 14.56 K/UL — HIGH (ref 3.8–10.5)

## 2023-09-20 PROCEDURE — 99285 EMERGENCY DEPT VISIT HI MDM: CPT

## 2023-09-20 PROCEDURE — 99223 1ST HOSP IP/OBS HIGH 75: CPT

## 2023-09-20 PROCEDURE — 71045 X-RAY EXAM CHEST 1 VIEW: CPT | Mod: 26

## 2023-09-20 RX ORDER — APIXABAN 2.5 MG/1
5 TABLET, FILM COATED ORAL EVERY 12 HOURS
Refills: 0 | Status: DISCONTINUED | OUTPATIENT
Start: 2023-09-20 | End: 2023-09-24

## 2023-09-20 RX ORDER — LANOLIN ALCOHOL/MO/W.PET/CERES
3 CREAM (GRAM) TOPICAL AT BEDTIME
Refills: 0 | Status: DISCONTINUED | OUTPATIENT
Start: 2023-09-20 | End: 2023-09-24

## 2023-09-20 RX ORDER — METOPROLOL TARTRATE 50 MG
25 TABLET ORAL EVERY 12 HOURS
Refills: 0 | Status: DISCONTINUED | OUTPATIENT
Start: 2023-09-20 | End: 2023-09-21

## 2023-09-20 RX ORDER — BUDESONIDE AND FORMOTEROL FUMARATE DIHYDRATE 160; 4.5 UG/1; UG/1
2 AEROSOL RESPIRATORY (INHALATION)
Refills: 0 | Status: DISCONTINUED | OUTPATIENT
Start: 2023-09-20 | End: 2023-09-22

## 2023-09-20 RX ORDER — DEXTROSE 50 % IN WATER 50 %
25 SYRINGE (ML) INTRAVENOUS ONCE
Refills: 0 | Status: DISCONTINUED | OUTPATIENT
Start: 2023-09-20 | End: 2023-09-24

## 2023-09-20 RX ORDER — DEXTROSE 50 % IN WATER 50 %
12.5 SYRINGE (ML) INTRAVENOUS ONCE
Refills: 0 | Status: DISCONTINUED | OUTPATIENT
Start: 2023-09-20 | End: 2023-09-24

## 2023-09-20 RX ORDER — DEXAMETHASONE 0.5 MG/5ML
6 ELIXIR ORAL DAILY
Refills: 0 | Status: DISCONTINUED | OUTPATIENT
Start: 2023-09-20 | End: 2023-09-24

## 2023-09-20 RX ORDER — REMDESIVIR 5 MG/ML
INJECTION INTRAVENOUS
Refills: 0 | Status: COMPLETED | OUTPATIENT
Start: 2023-09-20 | End: 2023-09-24

## 2023-09-20 RX ORDER — IPRATROPIUM/ALBUTEROL SULFATE 18-103MCG
3 AEROSOL WITH ADAPTER (GRAM) INHALATION ONCE
Refills: 0 | Status: COMPLETED | OUTPATIENT
Start: 2023-09-20 | End: 2023-09-20

## 2023-09-20 RX ORDER — TAMSULOSIN HYDROCHLORIDE 0.4 MG/1
0.4 CAPSULE ORAL AT BEDTIME
Refills: 0 | Status: DISCONTINUED | OUTPATIENT
Start: 2023-09-20 | End: 2023-09-24

## 2023-09-20 RX ORDER — REMDESIVIR 5 MG/ML
100 INJECTION INTRAVENOUS EVERY 24 HOURS
Refills: 0 | Status: COMPLETED | OUTPATIENT
Start: 2023-09-21 | End: 2023-09-24

## 2023-09-20 RX ORDER — ATORVASTATIN CALCIUM 80 MG/1
10 TABLET, FILM COATED ORAL AT BEDTIME
Refills: 0 | Status: DISCONTINUED | OUTPATIENT
Start: 2023-09-20 | End: 2023-09-24

## 2023-09-20 RX ORDER — MONTELUKAST 4 MG/1
10 TABLET, CHEWABLE ORAL DAILY
Refills: 0 | Status: DISCONTINUED | OUTPATIENT
Start: 2023-09-20 | End: 2023-09-24

## 2023-09-20 RX ORDER — ACETAMINOPHEN 500 MG
650 TABLET ORAL EVERY 6 HOURS
Refills: 0 | Status: DISCONTINUED | OUTPATIENT
Start: 2023-09-20 | End: 2023-09-24

## 2023-09-20 RX ORDER — SODIUM CHLORIDE 9 MG/ML
1000 INJECTION, SOLUTION INTRAVENOUS
Refills: 0 | Status: DISCONTINUED | OUTPATIENT
Start: 2023-09-20 | End: 2023-09-24

## 2023-09-20 RX ORDER — REMDESIVIR 5 MG/ML
200 INJECTION INTRAVENOUS EVERY 24 HOURS
Refills: 0 | Status: COMPLETED | OUTPATIENT
Start: 2023-09-20 | End: 2023-09-20

## 2023-09-20 RX ORDER — ALBUTEROL 90 UG/1
2 AEROSOL, METERED ORAL EVERY 6 HOURS
Refills: 0 | Status: DISCONTINUED | OUTPATIENT
Start: 2023-09-20 | End: 2023-09-24

## 2023-09-20 RX ORDER — GLUCAGON INJECTION, SOLUTION 0.5 MG/.1ML
1 INJECTION, SOLUTION SUBCUTANEOUS ONCE
Refills: 0 | Status: DISCONTINUED | OUTPATIENT
Start: 2023-09-20 | End: 2023-09-24

## 2023-09-20 RX ORDER — TRANEXAMIC ACID 100 MG/ML
5 INJECTION, SOLUTION INTRAVENOUS ONCE
Refills: 0 | Status: DISCONTINUED | OUTPATIENT
Start: 2023-09-20 | End: 2023-09-20

## 2023-09-20 RX ORDER — SODIUM CHLORIDE 9 MG/ML
500 INJECTION INTRAMUSCULAR; INTRAVENOUS; SUBCUTANEOUS ONCE
Refills: 0 | Status: COMPLETED | OUTPATIENT
Start: 2023-09-20 | End: 2023-09-20

## 2023-09-20 RX ORDER — DEXTROSE 50 % IN WATER 50 %
15 SYRINGE (ML) INTRAVENOUS ONCE
Refills: 0 | Status: DISCONTINUED | OUTPATIENT
Start: 2023-09-20 | End: 2023-09-24

## 2023-09-20 RX ORDER — GLIMEPIRIDE 1 MG
1 TABLET ORAL
Refills: 0 | DISCHARGE

## 2023-09-20 RX ORDER — ONDANSETRON 8 MG/1
4 TABLET, FILM COATED ORAL EVERY 8 HOURS
Refills: 0 | Status: DISCONTINUED | OUTPATIENT
Start: 2023-09-20 | End: 2023-09-24

## 2023-09-20 RX ORDER — EMPAGLIFLOZIN 10 MG/1
1 TABLET, FILM COATED ORAL
Refills: 0 | DISCHARGE

## 2023-09-20 RX ADMIN — SODIUM CHLORIDE 500 MILLILITER(S): 9 INJECTION INTRAMUSCULAR; INTRAVENOUS; SUBCUTANEOUS at 15:51

## 2023-09-20 RX ADMIN — TAMSULOSIN HYDROCHLORIDE 0.4 MILLIGRAM(S): 0.4 CAPSULE ORAL at 21:19

## 2023-09-20 RX ADMIN — BUDESONIDE AND FORMOTEROL FUMARATE DIHYDRATE 2 PUFF(S): 160; 4.5 AEROSOL RESPIRATORY (INHALATION) at 21:18

## 2023-09-20 RX ADMIN — Medication 3 MILLILITER(S): at 13:20

## 2023-09-20 RX ADMIN — Medication 50 MILLIGRAM(S): at 13:21

## 2023-09-20 RX ADMIN — Medication 3 MILLILITER(S): at 20:16

## 2023-09-20 RX ADMIN — ATORVASTATIN CALCIUM 10 MILLIGRAM(S): 80 TABLET, FILM COATED ORAL at 21:18

## 2023-09-20 RX ADMIN — REMDESIVIR 200 MILLIGRAM(S): 5 INJECTION INTRAVENOUS at 21:22

## 2023-09-20 NOTE — ED ADULT NURSE REASSESSMENT NOTE - NS ED NURSE REASSESS COMMENT FT1
Break RN-Hand off report given to [facility] RN. No signs of acute distress noted. Patient stable for transport. Call out made to ACP MD due to notable wheeze. Respirations even and unlabored. Oxygen saturation WNL on 2L NC Able to speak full complete sentences without difficulty. Airway open patent and unobstructed Pending orders and interventions Blanca POSADAS

## 2023-09-20 NOTE — ED ADULT NURSE NOTE - TEMPLATE
Per fax,    Requested Prescriptions     Pending Prescriptions Disp Refills    omeprazole (PRILOSEC) 20 mg capsule [Pharmacy Med Name: OMEPRAZOLE  20MG  CAP] 90 Cap      Sig: TAKE 1 CAPSULE BY MOUTH  DAILY    furosemide (LASIX) 40 mg tablet [Pharmacy Med Name: FUROSEMIDE  40MG  TAB] 135 Tab      Sig: TAKE 1 AND 1/2 TABLETS BY  MOUTH DAILY    ZENPEP 20,000-63,000- 84,000 unit capsule [Pharmacy Med Name: Prudencio Brown  CAP  [id number]] 300 Cap      Sig: TAKE 1 CAPSULE THREE TIMES  A DAY WITH MEALS General

## 2023-09-20 NOTE — H&P ADULT - PROBLEM SELECTOR PLAN 2
Patient with worsening SOB found to have COVID-19   -With hypoxia requiring O2   -CXR shows chronic ILD   -Will treat pt with dexamethasone and remdesivir IV   -Wean off oxygen as tolerated

## 2023-09-20 NOTE — ED PROVIDER NOTE - PROGRESS NOTE DETAILS
Isabella Med Tox Fellow: pt doing well though tachypnic to 30 on 2L NC oxygen while at rest. Pt TBA to tele for dyspnea on exertion given pts history.

## 2023-09-20 NOTE — ED PROVIDER NOTE - OBJECTIVE STATEMENT
87M h/o pulmonary fibrosis, DM, AFib, COPD, HTN, HLD p/w sob and chest pain. Reports symptom onset after doing physical therapy yesterady. Chest pain localized to anterior chest and describes as sharp, nonexertional, non-radiating. Also reports sob at reset and with exertion. Denies fever/chills. No cough. No calf pain/swelling. No known sick contacts or recent travels. No abd pain, n/v/d. Has bronchodilators at home that he took this AM and states these helped his symptoms.

## 2023-09-20 NOTE — ED ADULT NURSE NOTE - NSFALLUNIVINTERV_ED_ALL_ED
Bed/Stretcher in lowest position, wheels locked, appropriate side rails in place/Call bell, personal items and telephone in reach/Instruct patient to call for assistance before getting out of bed/chair/stretcher/Non-slip footwear applied when patient is off stretcher/Bracey to call system/Physically safe environment - no spills, clutter or unnecessary equipment/Purposeful proactive rounding/Room/bathroom lighting operational, light cord in reach

## 2023-09-20 NOTE — H&P ADULT - ASSESSMENT
86 yo M with PMhx of ILD, HTN, DMII, afib on eliquis, and HLD presents to the ED with worsening SOB, found to have COVID-19.

## 2023-09-20 NOTE — H&P ADULT - HISTORY OF PRESENT ILLNESS
88 yo M with PMhx of ILD, HTN, DMII, afib on eliquis, and HLD presents to the ED with worsening SOB. Patient reports that he started having SOB yesterday while doing PT. It progressively got worse. It was associated with L sided chest pain but it resolved. Today, his VNS came to his house and found that his BP and SO2 were low, prompting him to come to the ED.   In the ED, his vitals were notable for low BP and hypoxia. Labs were notable for leukocytosis, anemia, hyponatremia to 129, slight hyperglycemia, and RVP positive for SARSCOV2. Xray chest showed Pulmonary fibrosis without focal consolidation. EKG showed no acute changes.

## 2023-09-20 NOTE — H&P ADULT - NSHPPHYSICALEXAM_GEN_ALL_CORE
Vital Signs Last 24 Hrs  T(C): 36.4 (20 Sep 2023 12:33), Max: 36.4 (20 Sep 2023 12:33)  T(F): 97.6 (20 Sep 2023 12:33), Max: 97.6 (20 Sep 2023 12:33)  HR: 67 (20 Sep 2023 18:45) (67 - 77)  BP: 100/67 (20 Sep 2023 18:45) (79/52 - 100/67)  BP(mean): --  RR: 20 (20 Sep 2023 18:45) (18 - 21)  SpO2: 100% (20 Sep 2023 18:45) (97% - 100%)    Parameters below as of 20 Sep 2023 16:02  Patient On (Oxygen Delivery Method): nasal cannula  O2 Flow (L/min): 2  GENERAL: NAD, well-developed  HEENT:  Atraumatic, Normocephalic, Conjunctiva and sclera clear, oral mucosa moist, clear w/o any exudate   NECK: Supple, No JVD  CHEST/LUNG: b/l crackles; No wheeze  HEART: Regular rate and rhythm; No murmurs, rubs, or gallops  ABDOMEN: Soft, Nontender, Nondistended; Bowel sounds present  EXTREMITIES:  2+ Peripheral Pulses, No clubbing, cyanosis, or edema  PSYCH: AAOx3, normal affect  NEUROLOGY: non-focal, moving all extremities   SKIN: No rashes or lesions

## 2023-09-20 NOTE — H&P ADULT - PROBLEM SELECTOR PLAN 3
Patient with chronic ILD   -CXR showed no acute changes   -RVP is positive for SARsCOV2   -C/w dexamethasone

## 2023-09-20 NOTE — ED ADULT TRIAGE NOTE - CHIEF COMPLAINT QUOTE
PPE for this encounter included facial mask    Patient seen at: 2325    Chief Complaint   Patient presents with   • Skin Problem        History Of Present Illness  Lee is a 55 year old male presenting with a past history of diabetes and complaints of an insect bite to the left ankle.  Patient noticed some insect bite several days ago to his left ankle.  He did not think about for couple of days but today noticed that there was some increasing pain.  Not associate with any fevers or chills.  The area around the envenomation is quite sensitive.  There is no spreading redness now.  Patient is concerned because of the increasing pain in the history of diabetes that he may be developing an infection.  Last tetanus shot was a couple years ago.      Past Medical History  Past Medical History:   Diagnosis Date   • Abnormal kidney function    • ADHD    • Chronic left shoulder pain 12/3/2019   • Controlled type 2 diabetes mellitus with diabetic nephropathy (CMS/[facility])     DX 12/2012   • Current mild episode of major depressive disorder without prior episode (CMS/HCC) 9/12/2017    Dr Oneal   • Diverticulitis large intestine w/o perforation or abscess w/o bleeding     2018, 2012   • Diverticulosis    • Erectile dysfunction associated with type 2 diabetes mellitus (CMS/[facility])    • History of elevated PSA 7/13/2018   • Hypertension, benign 9/23/2013   • Mixed hyperlipidemia 9/23/2013   • Obesity (BMI 30-39.9) 9/23/2013   • Polycythemia    • Right shoulder pain    • Sleep apnea     cpap   • Thrombocytopenia (CMS/HCC) 1/18/2017        Surgical History  Past Surgical History:   Procedure Laterality Date   • Lasik surgery     • Shoulder arthroscopy Left 12/2019   • Sigmoidectomy  03/10/2021   • Tonsillectomy  1984   • Trigger finger release Right 09/2019        Social History  Social History     Tobacco Use   • Smoking status: Light Tobacco Smoker     Packs/day: 0.00     Types: Cigars   • Smokeless tobacco: Never Used   • Tobacco  comment: 4 cigars weekly    Vaping Use   • Vaping Use: Never assessed   Substance Use Topics   • Alcohol use: Yes     Comment: 1-2 drinks 2-3 months   • Drug use: No     Comment: vaping rare       Family History    Family History   Problem Relation Age of Onset   • Dementia/Alzheimers Mother    • Dementia/Alzheimers Father    • Hypertension Father    • Diabetes Father    • Migraine Sister    • Multiple Sclerosis Sister    • Patient is unaware of any medical problems Sister    • Cancer, Breast Maternal Aunt         Allergies  ALLERGIES:  Patient has no known allergies.    Medications  (Not in a hospital admission)      Review of Systems  Review of Systems   Constitutional: Negative for chills and fever.   Respiratory: Negative for cough and shortness of breath.    Cardiovascular: Negative for chest pain.         Last Recorded Vitals  Blood pressure 134/70, pulse 65, temperature 98.2 °F (36.8 °C), resp. rate 15, height 5' 11\" (1.803 m), weight 106.5 kg (234 lb 12.6 oz), SpO2 100 %.    Physical Exam  General Examination:  Patient is alert and pleasant, no acute distress  Skin: Warm and Dry, normal for ethnicity, no rashes  Head: Normocephalic, atraumatic  Eye: Normal conjunctiva, non icteric, Extra occular muscles are intact, vision normal for patient  ENT: Oropharynx is well hydrated, normal external ears and nose,   Neck: Trachea in the midline, supple  Lungs: No respiratory distress, equal breath sounds, lungs are clear to auscultation   Cardiac: Regular in rate and rhythm, no murmurs    Extremities: No deformity, no edema, normal range of motion, examination of the left lateral ankle area reveals a small area of erythema that appears to be about 3 mm in diameter.  Appears to be mostly some scraped tissue.  There is no spreading erythema around it.  There is no swelling to the area.  Patient is sensitive to palpation of the tissue around there.  There is normal dorsiflexion plantarflexion of the toes.  Excellent  dorsalis pedis and posterior tibialis pulses.  Back: Normal range of motion, normal alignment  Neurologic: Alert and oriented x4, normal strength, normal speech and normal thought process  Psychiatric: Cooperative, normal mood and affect    Patient does not appear to have any systemic symptoms.  He is having increasing discomfort to that area and so with his diabetes will be placed on some antibiotics in case this is an early infection.  Discomfort may be related to envenomation although the patient also notes that he was scraping that area with his opposite foot because it was itchy earlier.  He is asked to follow-up closely with his primary care doctor and to return to the emergency department for increasing symptoms or other complaints.    Imaging  Imaging Results    None        Labs   No results found for this visit on 10/06/21.          Assessment & Plan   ED Diagnosis        Final diagnosis    Insect bite of left ankle, initial encounter                 Medications   doxycycline hyclate (VIBRAMYCIN) capsule 100 mg (has no administration in time range)      MDM    Code Status    Code Status: Prior    Primary Care Physician  MD Lorenzo Garcia MD     This documentation accurately reflects the work and decisions made by me, Lorenzo Alonzo MD.     Lorenzo Alonzo MD  10/06/21 [id number]     pt c/o left side chest pain and sob x 2 days. pt 02 Sat 92% upon EMS arrival, placed on 3l NC. pt hypotensive in triage. arrives with 20 g to left hand.

## 2023-09-20 NOTE — H&P ADULT - PROBLEM SELECTOR PLAN 1
Patient with acute hypoxic respiratory failure   -Due to COVID-19 in the setting of chronic ILD   -CXR showed no focal PNA, monitor off abx   -COVID-19 treatment as below.   -Wean off oxygen as tolerated.

## 2023-09-20 NOTE — ED ADULT NURSE NOTE - CHIEF COMPLAINT QUOTE
pt c/o left side chest pain and sob x 2 days. pt 02 Sat 92% upon EMS arrival, placed on 3l NC. pt hypotensive in triage. arrives with 20 g to left hand.

## 2023-09-20 NOTE — ED PROVIDER NOTE - CLINICAL SUMMARY MEDICAL DECISION MAKING FREE TEXT BOX
87M h/o pulmonary fibrosis, DM, AFib, COPD, HTN, HLD p/w sob and chest pain with exam notable for crackles at bases b/l - ddx includes ACS vs pulmonary fibrosis/COPD exacerbation vs viral syndrome, less likely due to PE. Will get troponin and EKG to assess for ACS, check cbc, cmp, RVP. Will give duoneb and steroids for possible COPD/IPF. Check CXR to assess for edema or infectious etiology. Per daughter, pt has been getting short of breath going minimal distances at home, currently sat 95-97% on room air. Will need ambulatory sat prior to any dc though anticipate pt will likely require admission, pending w/u

## 2023-09-20 NOTE — H&P ADULT - NSHPLABSRESULTS_GEN_ALL_CORE
12.5   14.56 )-----------( 353      ( 20 Sep 2023 13:38 )             39.5     Hgb Trend: 12.5<--, 13.2<--  09-20    129<L>  |  97<L>  |  28<H>  ----------------------------<  169<H>  4.9   |  23  |  1.20    Ca    8.5      20 Sep 2023 13:38    TPro  7.7  /  Alb  3.0<L>  /  TBili  0.6  /  DBili  x   /  AST  14  /  ALT  21  /  AlkPhos  56  09-20    Creatinine Trend: 1.20<--, 1.21<--, 1.24<--, 1.38<--, 1.55<--, 1.47<--  PT/INR - ( 20 Sep 2023 13:38 )   PT: 19.7 sec;   INR: 1.79 ratio         PTT - ( 20 Sep 2023 13:38 )  PTT:30.0 sec      Urinalysis Basic - ( 20 Sep 2023 13:38 )    Color: x / Appearance: x / SG: x / pH: x  Gluc: 169 mg/dL / Ketone: x  / Bili: x / Urobili: x   Blood: x / Protein: x / Nitrite: x   Leuk Esterase: x / RBC: x / WBC x   Sq Epi: x / Non Sq Epi: x / Bacteria: x        CXR as reviewed by the radiologist: Pulmonary fibrosis without focal consolidation.      EKG is reviewed by me

## 2023-09-20 NOTE — ED ADULT NURSE NOTE - OBJECTIVE STATEMENT
pt aox4, ambulatory from home, comes in for onset of chest pain today. pt noted to be mildly tachypneic with abdominal muscle breathing. crackles noted at the bases. no edema to extremities. pt able to speak full sentences. skin intact. pt with left hand 20g in place inserted by medics. ekg completed, right ac20g inserted using aseptic technique, +blood return pt aox4, ambulatory from home, comes in for onset of chest pain today. pt noted to be mildly tachypneic with abdominal muscle breathing. crackles noted at the bases. no edema to extremities. pt able to speak full sentences. skin intact. pt with left hand 20g in place inserted by medics. ekg completed, right ac20g inserted using aseptic technique, +blood return.     pt notes he was recently diagnosed with cystic fibrosis during his last admission.

## 2023-09-21 ENCOUNTER — APPOINTMENT (OUTPATIENT)
Dept: PULMONOLOGY | Facility: CLINIC | Age: 88
End: 2023-09-21

## 2023-09-21 DIAGNOSIS — E87.5 HYPERKALEMIA: ICD-10-CM

## 2023-09-21 LAB
A1C WITH ESTIMATED AVERAGE GLUCOSE RESULT: 7.9 % — HIGH (ref 4–5.6)
ALBUMIN SERPL ELPH-MCNC: 3.2 G/DL — LOW (ref 3.3–5)
ALP SERPL-CCNC: 60 U/L — SIGNIFICANT CHANGE UP (ref 40–120)
ALT FLD-CCNC: 24 U/L — SIGNIFICANT CHANGE UP (ref 4–41)
ANION GAP SERPL CALC-SCNC: 11 MMOL/L — SIGNIFICANT CHANGE UP (ref 7–14)
ANION GAP SERPL CALC-SCNC: 17 MMOL/L — HIGH (ref 7–14)
ANTI PM-SCL-100 PLUS: <20 UNITS — SIGNIFICANT CHANGE UP
ANTI-SAE 1 IGG: <20 UNITS — SIGNIFICANT CHANGE UP
ANTI-SS-A 52 KD AB, IGG PLUS: 39 UNITS — HIGH
ANTI-U1-RNP AB PLUS: <20 UNITS — SIGNIFICANT CHANGE UP
AST SERPL-CCNC: 16 U/L — SIGNIFICANT CHANGE UP (ref 4–40)
BASOPHILS # BLD AUTO: 0.02 K/UL — SIGNIFICANT CHANGE UP (ref 0–0.2)
BASOPHILS NFR BLD AUTO: 0.1 % — SIGNIFICANT CHANGE UP (ref 0–2)
BILIRUB SERPL-MCNC: 0.4 MG/DL — SIGNIFICANT CHANGE UP (ref 0.2–1.2)
BUN SERPL-MCNC: 24 MG/DL — HIGH (ref 7–23)
BUN SERPL-MCNC: 30 MG/DL — HIGH (ref 7–23)
CALCIUM SERPL-MCNC: 8.5 MG/DL — SIGNIFICANT CHANGE UP (ref 8.4–10.5)
CALCIUM SERPL-MCNC: 8.8 MG/DL — SIGNIFICANT CHANGE UP (ref 8.4–10.5)
CHLORIDE SERPL-SCNC: 94 MMOL/L — LOW (ref 98–107)
CHLORIDE SERPL-SCNC: 99 MMOL/L — SIGNIFICANT CHANGE UP (ref 98–107)
CHOLEST SERPL-MCNC: 122 MG/DL — SIGNIFICANT CHANGE UP
CO2 SERPL-SCNC: 20 MMOL/L — LOW (ref 22–31)
CO2 SERPL-SCNC: 22 MMOL/L — SIGNIFICANT CHANGE UP (ref 22–31)
CREAT SERPL-MCNC: 1.03 MG/DL — SIGNIFICANT CHANGE UP (ref 0.5–1.3)
CREAT SERPL-MCNC: 1.16 MG/DL — SIGNIFICANT CHANGE UP (ref 0.5–1.3)
CRP SERPL-MCNC: 175.9 MG/L — HIGH
EGFR: 61 ML/MIN/1.73M2 — SIGNIFICANT CHANGE UP
EGFR: 70 ML/MIN/1.73M2 — SIGNIFICANT CHANGE UP
EJ MYOMARKER3 PLUS: NEGATIVE — SIGNIFICANT CHANGE UP
ENA JO1 AB SER IA-ACNC: <20 UNITS — SIGNIFICANT CHANGE UP
EOSINOPHIL # BLD AUTO: 0.03 K/UL — SIGNIFICANT CHANGE UP (ref 0–0.5)
EOSINOPHIL NFR BLD AUTO: 0.2 % — SIGNIFICANT CHANGE UP (ref 0–6)
ESTIMATED AVERAGE GLUCOSE: 180 — SIGNIFICANT CHANGE UP
FERRITIN SERPL-MCNC: 922 NG/ML — HIGH (ref 30–400)
FIBRILLARIN (U3 RNP) PLUS: NEGATIVE — SIGNIFICANT CHANGE UP
GLUCOSE BLDC GLUCOMTR-MCNC: 124 MG/DL — HIGH (ref 70–99)
GLUCOSE BLDC GLUCOMTR-MCNC: 268 MG/DL — HIGH (ref 70–99)
GLUCOSE BLDC GLUCOMTR-MCNC: 268 MG/DL — HIGH (ref 70–99)
GLUCOSE BLDC GLUCOMTR-MCNC: 270 MG/DL — HIGH (ref 70–99)
GLUCOSE SERPL-MCNC: 115 MG/DL — HIGH (ref 70–99)
GLUCOSE SERPL-MCNC: 230 MG/DL — HIGH (ref 70–99)
HAPTOGLOB SERPL-MCNC: 240 MG/DL — HIGH (ref 34–200)
HCT VFR BLD CALC: 40.4 % — SIGNIFICANT CHANGE UP (ref 39–50)
HDLC SERPL-MCNC: 55 MG/DL — SIGNIFICANT CHANGE UP
HGB BLD-MCNC: 12.9 G/DL — LOW (ref 13–17)
IANC: 13.11 K/UL — HIGH (ref 1.8–7.4)
IMM GRANULOCYTES NFR BLD AUTO: 0.7 % — SIGNIFICANT CHANGE UP (ref 0–0.9)
KU MYOMARKER3 PLUS: NEGATIVE — SIGNIFICANT CHANGE UP
LDH SERPL L TO P-CCNC: 263 U/L — HIGH (ref 135–225)
LIPID PNL WITH DIRECT LDL SERPL: 56 MG/DL — SIGNIFICANT CHANGE UP
LYMPHOCYTES # BLD AUTO: 1.93 K/UL — SIGNIFICANT CHANGE UP (ref 1–3.3)
LYMPHOCYTES # BLD AUTO: 11.6 % — LOW (ref 13–44)
MAGNESIUM SERPL-MCNC: 2.6 MG/DL — SIGNIFICANT CHANGE UP (ref 1.6–2.6)
MCHC RBC-ENTMCNC: 29.6 PG — SIGNIFICANT CHANGE UP (ref 27–34)
MCHC RBC-ENTMCNC: 31.9 GM/DL — LOW (ref 32–36)
MCV RBC AUTO: 92.7 FL — SIGNIFICANT CHANGE UP (ref 80–100)
MDA5 (P140)(CADM-140) PLUS: <20 UNITS — SIGNIFICANT CHANGE UP
MI-2 PLUS: NEGATIVE — SIGNIFICANT CHANGE UP
MONOCYTES # BLD AUTO: 1.48 K/UL — HIGH (ref 0–0.9)
MONOCYTES NFR BLD AUTO: 8.9 % — SIGNIFICANT CHANGE UP (ref 2–14)
NEUTROPHILS # BLD AUTO: 13.11 K/UL — HIGH (ref 1.8–7.4)
NEUTROPHILS NFR BLD AUTO: 78.5 % — HIGH (ref 43–77)
NON HDL CHOLESTEROL: 67 MG/DL — SIGNIFICANT CHANGE UP
NRBC # BLD: 0 /100 WBCS — SIGNIFICANT CHANGE UP (ref 0–0)
NRBC # FLD: 0 K/UL — SIGNIFICANT CHANGE UP (ref 0–0)
NXP-2 (P140) MYOPLUS: <20 UNITS — SIGNIFICANT CHANGE UP
OJ MYOMARKER3 PLUS: NEGATIVE — SIGNIFICANT CHANGE UP
PHOSPHATE SERPL-MCNC: 3.2 MG/DL — SIGNIFICANT CHANGE UP (ref 2.5–4.5)
PL-12 PLUS: NEGATIVE — SIGNIFICANT CHANGE UP
PL-7 PLUS: NEGATIVE — SIGNIFICANT CHANGE UP
PLATELET # BLD AUTO: 336 K/UL — SIGNIFICANT CHANGE UP (ref 150–400)
POTASSIUM SERPL-MCNC: 5.2 MMOL/L — SIGNIFICANT CHANGE UP (ref 3.5–5.3)
POTASSIUM SERPL-MCNC: 5.4 MMOL/L — HIGH (ref 3.5–5.3)
POTASSIUM SERPL-SCNC: 5.2 MMOL/L — SIGNIFICANT CHANGE UP (ref 3.5–5.3)
POTASSIUM SERPL-SCNC: 5.4 MMOL/L — HIGH (ref 3.5–5.3)
PROT SERPL-MCNC: 8.3 G/DL — SIGNIFICANT CHANGE UP (ref 6–8.3)
RBC # BLD: 4.36 M/UL — SIGNIFICANT CHANGE UP (ref 4.2–5.8)
RBC # FLD: 13.4 % — SIGNIFICANT CHANGE UP (ref 10.3–14.5)
SODIUM SERPL-SCNC: 131 MMOL/L — LOW (ref 135–145)
SODIUM SERPL-SCNC: 132 MMOL/L — LOW (ref 135–145)
SRP MYOMARKER3 PLUS: NEGATIVE — SIGNIFICANT CHANGE UP
TIF GAMMA (P155/140) PLUS: <20 UNITS — SIGNIFICANT CHANGE UP
TRIGL SERPL-MCNC: 54 MG/DL — SIGNIFICANT CHANGE UP
U2 SNRNP PLUS: NEGATIVE — SIGNIFICANT CHANGE UP
WBC # BLD: 16.68 K/UL — HIGH (ref 3.8–10.5)
WBC # FLD AUTO: 16.68 K/UL — HIGH (ref 3.8–10.5)

## 2023-09-21 PROCEDURE — 99232 SBSQ HOSP IP/OBS MODERATE 35: CPT

## 2023-09-21 PROCEDURE — 99223 1ST HOSP IP/OBS HIGH 75: CPT

## 2023-09-21 PROCEDURE — 93010 ELECTROCARDIOGRAM REPORT: CPT

## 2023-09-21 RX ORDER — INSULIN LISPRO 100/ML
VIAL (ML) SUBCUTANEOUS AT BEDTIME
Refills: 0 | Status: DISCONTINUED | OUTPATIENT
Start: 2023-09-21 | End: 2023-09-24

## 2023-09-21 RX ORDER — METOPROLOL TARTRATE 50 MG
25 TABLET ORAL EVERY 12 HOURS
Refills: 0 | Status: DISCONTINUED | OUTPATIENT
Start: 2023-09-21 | End: 2023-09-24

## 2023-09-21 RX ORDER — INSULIN LISPRO 100/ML
VIAL (ML) SUBCUTANEOUS
Refills: 0 | Status: DISCONTINUED | OUTPATIENT
Start: 2023-09-21 | End: 2023-09-24

## 2023-09-21 RX ORDER — METOPROLOL TARTRATE 50 MG
25 TABLET ORAL ONCE
Refills: 0 | Status: COMPLETED | OUTPATIENT
Start: 2023-09-21 | End: 2023-09-21

## 2023-09-21 RX ADMIN — Medication 25 MILLIGRAM(S): at 06:33

## 2023-09-21 RX ADMIN — REMDESIVIR 200 MILLIGRAM(S): 5 INJECTION INTRAVENOUS at 21:52

## 2023-09-21 RX ADMIN — BUDESONIDE AND FORMOTEROL FUMARATE DIHYDRATE 2 PUFF(S): 160; 4.5 AEROSOL RESPIRATORY (INHALATION) at 21:52

## 2023-09-21 RX ADMIN — MONTELUKAST 10 MILLIGRAM(S): 4 TABLET, CHEWABLE ORAL at 10:10

## 2023-09-21 RX ADMIN — APIXABAN 5 MILLIGRAM(S): 2.5 TABLET, FILM COATED ORAL at 17:08

## 2023-09-21 RX ADMIN — BUDESONIDE AND FORMOTEROL FUMARATE DIHYDRATE 2 PUFF(S): 160; 4.5 AEROSOL RESPIRATORY (INHALATION) at 10:09

## 2023-09-21 RX ADMIN — Medication 25 MILLIGRAM(S): at 12:14

## 2023-09-21 RX ADMIN — Medication 6 MILLIGRAM(S): at 06:33

## 2023-09-21 RX ADMIN — Medication 25 MILLIGRAM(S): at 17:09

## 2023-09-21 RX ADMIN — ATORVASTATIN CALCIUM 10 MILLIGRAM(S): 80 TABLET, FILM COATED ORAL at 21:52

## 2023-09-21 RX ADMIN — Medication 3: at 17:10

## 2023-09-21 RX ADMIN — APIXABAN 5 MILLIGRAM(S): 2.5 TABLET, FILM COATED ORAL at 06:33

## 2023-09-21 RX ADMIN — TAMSULOSIN HYDROCHLORIDE 0.4 MILLIGRAM(S): 0.4 CAPSULE ORAL at 21:52

## 2023-09-21 NOTE — CONSULT NOTE ADULT - SUBJECTIVE AND OBJECTIVE BOX
HPI:  88 yo M with PMhx of ILD, HTN, DMII, afib on eliquis, and HLD presents to the ED with worsening SOB. Patient reports that he started having SOB yesterday while doing PT. It progressively got worse. It was associated with L sided chest pain but it resolved. Today, his VNS came to his house and found that his BP and SO2 were low, prompting him to come to the ED.   In the ED, his vitals were notable for low BP and hypoxia. Labs were notable for leukocytosis, anemia, hyponatremia to 129, slight hyperglycemia, and RVP positive for SARSCOV2. Xray chest showed Pulmonary fibrosis without focal consolidation. EKG showed no acute changes.       PAST MEDICAL & SURGICAL HISTORY:  DM (diabetes mellitus)      HTN (hypertension)      HLD (hyperlipidemia)      Former smoker      COPD (chronic obstructive pulmonary disease)      Afib      Pulmonary fibrosis      No significant past surgical history          FAMILY HISTORY:  No pertinent family history in first degree relatives        SOCIAL HISTORY:  Smoking: [ ] Never Smoked [ ] Former Smoker (__ packs x ___ years) [ ] Current Smoker  (__ packs x ___ years)  Substance Use: [ ] Never Used [ ] Used ____  EtOH Use:  Marital Status: [ ] Single [ ]  [ ]  [ ]   Sexual History:   Occupation:  Recent Travel:  Country of Birth:  Advance Directives:    Allergies    No Known Allergies    Intolerances        HOME MEDICATIONS:  Home Medications:  albuterol 90 mcg/inh inhalation aerosol: 2 puff(s) inhaled every 6 hours As needed for bronchospasm (20 Sep 2023 19:40)  atorvastatin 10 mg oral tablet: 1 tab(s) orally once a day (at bedtime) (20 Sep 2023 19:40)  Cozaar 100 mg oral tablet: 0.5 tab(s) orally once a day Take  half tablet (50mg) daily  starting 9/2/23 (20 Sep 2023 19:40)  glimepiride 2 mg oral tablet: 1 tab(s) orally 2 times a day (20 Sep 2023 19:40)  Jardiance 10 mg oral tablet: 1 tab(s) orally once a day (20 Sep 2023 19:40)  montelukast 10 mg oral tablet: 1 tab(s) orally once a day (20 Sep 2023 19:40)      REVIEW OF SYSTEMS:  All systems negative except as documented above.    OBJECTIVE:  ICU Vital Signs Last 24 Hrs  T(C): 36.6 (21 Sep 2023 13:45), Max: 37 (20 Sep 2023 21:20)  T(F): 97.8 (21 Sep 2023 13:45), Max: 98.6 (20 Sep 2023 21:20)  HR: 85 (21 Sep 2023 13:45) (66 - 110)  BP: 110/63 (21 Sep 2023 13:45) (97/69 - 113/80)  BP(mean): --  ABP: --  ABP(mean): --  RR: 18 (21 Sep 2023 13:45) (18 - 21)  SpO2: 99% (21 Sep 2023 13:45) (97% - 100%)    O2 Parameters below as of 21 Sep 2023 13:45  Patient On (Oxygen Delivery Method): nasal cannula  O2 Flow (L/min): 2            CAPILLARY BLOOD GLUCOSE      POCT Blood Glucose.: 268 mg/dL (21 Sep 2023 13:54)      PHYSICAL EXAM:  General: NAD  HEENT: EOMI, sclera anicteric  Neck: supple  Cardiovascular: RR  Respiratory: CTAB, no wheezes, crackles, or rhonci  Abdomen: soft  Extremities: warm and well perfused, no edema, no clubbing  Skin: no rashes  Neurological: AOx3, no focal deficits    HOSPITAL MEDICATIONS:  Standing Meds:  apixaban 5 milliGRAM(s) Oral every 12 hours  atorvastatin 10 milliGRAM(s) Oral at bedtime  budesonide  80 MICROgram(s)/formoterol 4.5 MICROgram(s) Inhaler 2 Puff(s) Inhalation two times a day  dexAMETHasone     Tablet 6 milliGRAM(s) Oral daily  dextrose 5%. 1000 milliLiter(s) IV Continuous <Continuous>  dextrose 5%. 1000 milliLiter(s) IV Continuous <Continuous>  dextrose 50% Injectable 25 Gram(s) IV Push once  dextrose 50% Injectable 12.5 Gram(s) IV Push once  dextrose 50% Injectable 25 Gram(s) IV Push once  glucagon  Injectable 1 milliGRAM(s) IntraMuscular once  metoprolol tartrate 25 milliGRAM(s) Oral every 12 hours  montelukast 10 milliGRAM(s) Oral daily  remdesivir  IVPB 100 milliGRAM(s) IV Intermittent every 24 hours  remdesivir  IVPB   IV Intermittent   tamsulosin 0.4 milliGRAM(s) Oral at bedtime      PRN Meds:  acetaminophen     Tablet .. 650 milliGRAM(s) Oral every 6 hours PRN  albuterol    90 MICROgram(s) HFA Inhaler 2 Puff(s) Inhalation every 6 hours PRN  aluminum hydroxide/magnesium hydroxide/simethicone Suspension 30 milliLiter(s) Oral every 4 hours PRN  dextrose Oral Gel 15 Gram(s) Oral once PRN  melatonin 3 milliGRAM(s) Oral at bedtime PRN  ondansetron Injectable 4 milliGRAM(s) IV Push every 8 hours PRN      LABS:                        12.9   16.68 )-----------( 336      ( 21 Sep 2023 07:19 )             40.4     Hgb Trend: 12.9<--, 12.5<--, 13.2<--  09-21    131<L>  |  94<L>  |  24<H>  ----------------------------<  115<H>  5.4<H>   |  20<L>  |  1.03    Ca    8.8      21 Sep 2023 07:19    TPro  8.3  /  Alb  3.2<L>  /  TBili  0.4  /  DBili  x   /  AST  16  /  ALT  24  /  AlkPhos  60  09-21    Creatinine Trend: 1.03<--, 1.20<--, 1.21<--, 1.24<--, 1.38<--, 1.55<--  PT/INR - ( 20 Sep 2023 13:38 )   PT: 19.7 sec;   INR: 1.79 ratio         PTT - ( 20 Sep 2023 13:38 )  PTT:30.0 sec  Urinalysis Basic - ( 21 Sep 2023 07:19 )    Color: x / Appearance: x / SG: x / pH: x  Gluc: 115 mg/dL / Ketone: x  / Bili: x / Urobili: x   Blood: x / Protein: x / Nitrite: x   Leuk Esterase: x / RBC: x / WBC x   Sq Epi: x / Non Sq Epi: x / Bacteria: x            MICROBIOLOGY:       RADIOLOGY:  [x] Reviewed and interpreted by me   HPI:  86 yo M with PMhx of ILD, HTN, DMII, afib on eliquis, and HLD presents to the ED with worsening SOB. Pt last hospitalized 3 weeks ago for dyspnea and CP. Found to be in afib with RVR. CT showed pulmonary fibrosis, new diagnosis. Currently on eliquis and metoprolol for Afib. Returned to ED on 9/20 for dyspnea. Found to be COVID positive. CXR stable, no signs of pneumonia. No fever, chill, rhinorrhea, sore throat, abd pain, urinary sx. Does have a new cough. Pulm consulted as pt has acute hypoxic resp failure iso of ILD and COPD.    Distance hx of heavy smoking. Smoke 50 cig/day for 10-15 years, quit 30 years ago. 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 No use of wood stove or animal dung.       PAST MEDICAL & SURGICAL HISTORY:  DM (diabetes mellitus)      HTN (hypertension)      HLD (hyperlipidemia)      Former smoker      COPD (chronic obstructive pulmonary disease)      Afib      Pulmonary fibrosis      No significant past surgical history          FAMILY HISTORY:  No pertinent family history in first degree relatives        SOCIAL HISTORY:  Smoking: [ ] Never Smoked [ ] Former Smoker (__ packs x ___ years) [ ] Current Smoker  (__ packs x ___ years)  Substance Use: [ ] Never Used [ ] Used ____  EtOH Use:  Marital Status: [ ] Single [ ]  [ ]  [ ]   Sexual History:   Occupation:  Recent Travel:  Country of Birth:  Advance Directives:    Allergies    No Known Allergies    Intolerances        HOME MEDICATIONS:  Home Medications:  albuterol 90 mcg/inh inhalation aerosol: 2 puff(s) inhaled every 6 hours As needed for bronchospasm (20 Sep 2023 19:40)  atorvastatin 10 mg oral tablet: 1 tab(s) orally once a day (at bedtime) (20 Sep 2023 19:40)  Cozaar 100 mg oral tablet: 0.5 tab(s) orally once a day Take  half tablet (50mg) daily  starting 9/2/23 (20 Sep 2023 19:40)  glimepiride 2 mg oral tablet: 1 tab(s) orally 2 times a day (20 Sep 2023 19:40)  Jardiance 10 mg oral tablet: 1 tab(s) orally once a day (20 Sep 2023 19:40)  montelukast 10 mg oral tablet: 1 tab(s) orally once a day (20 Sep 2023 19:40)      REVIEW OF SYSTEMS:  All systems negative except as documented above.    OBJECTIVE:  ICU Vital Signs Last 24 Hrs  T(C): 36.6 (21 Sep 2023 13:45), Max: 37 (20 Sep 2023 21:20)  T(F): 97.8 (21 Sep 2023 13:45), Max: 98.6 (20 Sep 2023 21:20)  HR: 85 (21 Sep 2023 13:45) (66 - 110)  BP: 110/63 (21 Sep 2023 13:45) (97/69 - 113/80)  BP(mean): --  ABP: --  ABP(mean): --  RR: 18 (21 Sep 2023 13:45) (18 - 21)  SpO2: 99% (21 Sep 2023 13:45) (97% - 100%)    O2 Parameters below as of 21 Sep 2023 13:45  Patient On (Oxygen Delivery Method): nasal cannula  O2 Flow (L/min): 2            CAPILLARY BLOOD GLUCOSE      POCT Blood Glucose.: 268 mg/dL (21 Sep 2023 13:54)      PHYSICAL EXAM:  General: NAD. reclining in bed  HEENT: EOMI, sclera anicteric  Neck: supple  Cardiovascular: RR  Respiratory: tachypnea, no wheezing, bibasilar course crackle  Abdomen: soft  Extremities: warm and well perfused, no edema, no clubbing  Skin: no rashes  Neurological: AOx3, no focal deficits    HOSPITAL MEDICATIONS:  Standing Meds:  apixaban 5 milliGRAM(s) Oral every 12 hours  atorvastatin 10 milliGRAM(s) Oral at bedtime  budesonide  80 MICROgram(s)/formoterol 4.5 MICROgram(s) Inhaler 2 Puff(s) Inhalation two times a day  dexAMETHasone     Tablet 6 milliGRAM(s) Oral daily  dextrose 5%. 1000 milliLiter(s) IV Continuous <Continuous>  dextrose 5%. 1000 milliLiter(s) IV Continuous <Continuous>  dextrose 50% Injectable 25 Gram(s) IV Push once  dextrose 50% Injectable 12.5 Gram(s) IV Push once  dextrose 50% Injectable 25 Gram(s) IV Push once  glucagon  Injectable 1 milliGRAM(s) IntraMuscular once  metoprolol tartrate 25 milliGRAM(s) Oral every 12 hours  montelukast 10 milliGRAM(s) Oral daily  remdesivir  IVPB 100 milliGRAM(s) IV Intermittent every 24 hours  remdesivir  IVPB   IV Intermittent   tamsulosin 0.4 milliGRAM(s) Oral at bedtime      PRN Meds:  acetaminophen     Tablet .. 650 milliGRAM(s) Oral every 6 hours PRN  albuterol    90 MICROgram(s) HFA Inhaler 2 Puff(s) Inhalation every 6 hours PRN  aluminum hydroxide/magnesium hydroxide/simethicone Suspension 30 milliLiter(s) Oral every 4 hours PRN  dextrose Oral Gel 15 Gram(s) Oral once PRN  melatonin 3 milliGRAM(s) Oral at bedtime PRN  ondansetron Injectable 4 milliGRAM(s) IV Push every 8 hours PRN      LABS:                        12.9   16.68 )-----------( 336      ( 21 Sep 2023 07:19 )             40.4     Hgb Trend: 12.9<--, 12.5<--, 13.2<--  09-21    131<L>  |  94<L>  |  24<H>  ----------------------------<  115<H>  5.4<H>   |  20<L>  |  1.03    Ca    8.8      21 Sep 2023 07:19    TPro  8.3  /  Alb  3.2<L>  /  TBili  0.4  /  DBili  x   /  AST  16  /  ALT  24  /  AlkPhos  60  09-21    Creatinine Trend: 1.03<--, 1.20<--, 1.21<--, 1.24<--, 1.38<--, 1.55<--  PT/INR - ( 20 Sep 2023 13:38 )   PT: 19.7 sec;   INR: 1.79 ratio         PTT - ( 20 Sep 2023 13:38 )  PTT:30.0 sec  Urinalysis Basic - ( 21 Sep 2023 07:19 )    Color: x / Appearance: x / SG: x / pH: x  Gluc: 115 mg/dL / Ketone: x  / Bili: x / Urobili: x   Blood: x / Protein: x / Nitrite: x   Leuk Esterase: x / RBC: x / WBC x   Sq Epi: x / Non Sq Epi: x / Bacteria: x            MICROBIOLOGY:       RADIOLOGY:  [x] Reviewed and interpreted by me

## 2023-09-21 NOTE — PROGRESS NOTE ADULT - PROBLEM SELECTOR PLAN 6
Patient with hx Afib   -increase metoprolol 25 mg to q8 for now, given RVR periodically   -C/w eliquis 5 mg BID

## 2023-09-21 NOTE — PROGRESS NOTE ADULT - PROBLEM SELECTOR PLAN 3
Patient with chronic ILD   -CXR showed no acute changes   -RVP is positive for SARsCOV2   -continue dexamethasone and remdesivir IV   - pulm consulted

## 2023-09-22 LAB
ALBUMIN SERPL ELPH-MCNC: 2.7 G/DL — LOW (ref 3.3–5)
ALP SERPL-CCNC: 53 U/L — SIGNIFICANT CHANGE UP (ref 40–120)
ALT FLD-CCNC: 20 U/L — SIGNIFICANT CHANGE UP (ref 4–41)
ANION GAP SERPL CALC-SCNC: 9 MMOL/L — SIGNIFICANT CHANGE UP (ref 7–14)
AST SERPL-CCNC: 13 U/L — SIGNIFICANT CHANGE UP (ref 4–40)
BILIRUB SERPL-MCNC: 0.3 MG/DL — SIGNIFICANT CHANGE UP (ref 0.2–1.2)
BUN SERPL-MCNC: 30 MG/DL — HIGH (ref 7–23)
CALCIUM SERPL-MCNC: 8.6 MG/DL — SIGNIFICANT CHANGE UP (ref 8.4–10.5)
CHLORIDE SERPL-SCNC: 98 MMOL/L — SIGNIFICANT CHANGE UP (ref 98–107)
CO2 SERPL-SCNC: 24 MMOL/L — SIGNIFICANT CHANGE UP (ref 22–31)
CREAT SERPL-MCNC: 1.04 MG/DL — SIGNIFICANT CHANGE UP (ref 0.5–1.3)
EGFR: 69 ML/MIN/1.73M2 — SIGNIFICANT CHANGE UP
GLUCOSE BLDC GLUCOMTR-MCNC: 100 MG/DL — HIGH (ref 70–99)
GLUCOSE BLDC GLUCOMTR-MCNC: 123 MG/DL — HIGH (ref 70–99)
GLUCOSE BLDC GLUCOMTR-MCNC: 191 MG/DL — HIGH (ref 70–99)
GLUCOSE BLDC GLUCOMTR-MCNC: 202 MG/DL — HIGH (ref 70–99)
GLUCOSE SERPL-MCNC: 97 MG/DL — SIGNIFICANT CHANGE UP (ref 70–99)
HCT VFR BLD CALC: 38.1 % — LOW (ref 39–50)
HGB BLD-MCNC: 12 G/DL — LOW (ref 13–17)
MAGNESIUM SERPL-MCNC: 2.7 MG/DL — HIGH (ref 1.6–2.6)
MCHC RBC-ENTMCNC: 28.8 PG — SIGNIFICANT CHANGE UP (ref 27–34)
MCHC RBC-ENTMCNC: 31.5 GM/DL — LOW (ref 32–36)
MCV RBC AUTO: 91.4 FL — SIGNIFICANT CHANGE UP (ref 80–100)
NRBC # BLD: 0 /100 WBCS — SIGNIFICANT CHANGE UP (ref 0–0)
NRBC # FLD: 0 K/UL — SIGNIFICANT CHANGE UP (ref 0–0)
PHOSPHATE SERPL-MCNC: 3 MG/DL — SIGNIFICANT CHANGE UP (ref 2.5–4.5)
PLATELET # BLD AUTO: 283 K/UL — SIGNIFICANT CHANGE UP (ref 150–400)
POTASSIUM SERPL-MCNC: 5 MMOL/L — SIGNIFICANT CHANGE UP (ref 3.5–5.3)
POTASSIUM SERPL-SCNC: 5 MMOL/L — SIGNIFICANT CHANGE UP (ref 3.5–5.3)
PROT SERPL-MCNC: 7.4 G/DL — SIGNIFICANT CHANGE UP (ref 6–8.3)
RBC # BLD: 4.17 M/UL — LOW (ref 4.2–5.8)
RBC # FLD: 13.5 % — SIGNIFICANT CHANGE UP (ref 10.3–14.5)
SODIUM SERPL-SCNC: 131 MMOL/L — LOW (ref 135–145)
WBC # BLD: 14.01 K/UL — HIGH (ref 3.8–10.5)
WBC # FLD AUTO: 14.01 K/UL — HIGH (ref 3.8–10.5)

## 2023-09-22 PROCEDURE — 99232 SBSQ HOSP IP/OBS MODERATE 35: CPT

## 2023-09-22 PROCEDURE — 99232 SBSQ HOSP IP/OBS MODERATE 35: CPT | Mod: GC

## 2023-09-22 RX ORDER — SODIUM ZIRCONIUM CYCLOSILICATE 10 G/10G
10 POWDER, FOR SUSPENSION ORAL ONCE
Refills: 0 | Status: DISCONTINUED | OUTPATIENT
Start: 2023-09-22 | End: 2023-09-23

## 2023-09-22 RX ORDER — BUDESONIDE AND FORMOTEROL FUMARATE DIHYDRATE 160; 4.5 UG/1; UG/1
2 AEROSOL RESPIRATORY (INHALATION)
Refills: 0 | Status: DISCONTINUED | OUTPATIENT
Start: 2023-09-22 | End: 2023-09-24

## 2023-09-22 RX ADMIN — Medication 25 MILLIGRAM(S): at 17:52

## 2023-09-22 RX ADMIN — Medication 6 MILLIGRAM(S): at 05:05

## 2023-09-22 RX ADMIN — ALBUTEROL 2 PUFF(S): 90 AEROSOL, METERED ORAL at 19:11

## 2023-09-22 RX ADMIN — TAMSULOSIN HYDROCHLORIDE 0.4 MILLIGRAM(S): 0.4 CAPSULE ORAL at 21:21

## 2023-09-22 RX ADMIN — REMDESIVIR 200 MILLIGRAM(S): 5 INJECTION INTRAVENOUS at 22:41

## 2023-09-22 RX ADMIN — APIXABAN 5 MILLIGRAM(S): 2.5 TABLET, FILM COATED ORAL at 05:06

## 2023-09-22 RX ADMIN — MONTELUKAST 10 MILLIGRAM(S): 4 TABLET, CHEWABLE ORAL at 12:30

## 2023-09-22 RX ADMIN — ATORVASTATIN CALCIUM 10 MILLIGRAM(S): 80 TABLET, FILM COATED ORAL at 21:21

## 2023-09-22 RX ADMIN — Medication 2: at 12:36

## 2023-09-22 RX ADMIN — Medication 25 MILLIGRAM(S): at 05:06

## 2023-09-22 RX ADMIN — BUDESONIDE AND FORMOTEROL FUMARATE DIHYDRATE 2 PUFF(S): 160; 4.5 AEROSOL RESPIRATORY (INHALATION) at 08:40

## 2023-09-22 RX ADMIN — BUDESONIDE AND FORMOTEROL FUMARATE DIHYDRATE 2 PUFF(S): 160; 4.5 AEROSOL RESPIRATORY (INHALATION) at 21:29

## 2023-09-22 RX ADMIN — APIXABAN 5 MILLIGRAM(S): 2.5 TABLET, FILM COATED ORAL at 17:52

## 2023-09-22 NOTE — PROVIDER CONTACT NOTE (OTHER) - SITUATION
HR per cardiac monitor down to 43.
as per tele tech, pt converted into afib from sinus rhythm.
patient has a heart rate of 160 sustained

## 2023-09-22 NOTE — CONSULT NOTE ADULT - ASSESSMENT
An 87-year-old male with a history of ILD, HTN, non-insulin-dependent DM2, A-Fib on apixaban, COPD, and COVID-19, presented with worsening SOB. 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 His hospital course included remdesivir, dexamethasone. Currently, he is on 2L oxygen therapy.    # UIP  - Chest CT (8/30) showed fibrotic interstitial lung disease (UIP pattern)  - Serology showed positive anti-52  - Pulmonology recommended to start antifibrotic as outpatient   - No systemic signs of CTDs     # COVID-19 infection  - Currently on 2 l NC, remdesivir, dexamethasone     Recommendations  1.  An 87-year-old male with a history of ILD, HTN, non-insulin-dependent DM2, A-Fib on apixaban, COPD, and COVID-19, presented with worsening SOB. 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 His hospital course included remdesivir, dexamethasone. Currently, he is on 2L oxygen therapy.    # UIP  - Chest CT (8/30) showed fibrotic interstitial lung disease (UIP pattern)  - Serology showed positive Anti-52  - Pulmonology recommended to start antifibrotic as outpatient   - No systemic signs of CTDs     # COVID-19 infection  - Currently on 2 l NC, remdesivir, dexamethasone     Recommendations  1. We are agree with Pulmonology to initiate antifibrotic treatment, which can begin on an outpatient basis.  2. Currently, there is no indication for steroids or any other immunosuppressive agents at this time for ILD  3. The patient will be scheduled for follow-up with the Rheumatology clinic upon discharge.  4. Defer COVID-19 treatment to the primary team    Rheumatology will sign off the patient     D/w Dr. Dangelo Barron MD  PGY-4  Rheumatology Fellow  [employer] on TEAMS  [phone]

## 2023-09-22 NOTE — CONSULT NOTE ADULT - SUBJECTIVE AND OBJECTIVE BOX
***consult has been received. 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 The patient was previously hospitalized 3  weeks ago due to dyspnea, during which a chest CT revealed fibrotic interstitial lung disease, indicating UIP pattern. He was scheduled for an outpatient pulmonology appointment, but the patient returned to the ED on September 20th, reporting dyspnea and a cough persisting for the past 4-5 days. While in the ED, the patient tested positive for COVID-19. A chest X-ray showed no acute changes, and the patient denied experiencing fever, chills, rhinorrhea, sore throat, abdominal pain, rash, joint pain, sicca symptoms, or Raynaud's phenomenon.    No family history of CTDs    Hospital course:   - Pt was started on remdesivir for 5 days and dexamethasone 6 mg IV for 10 days   - Pulm consulted as pt has acute hypoxic resp failure iso of ILD and COPD. Recommended antifibrotic treatment as outpatient  - Currently on NC 2L      Rheum ROS    Denies fevers/chills/weight loss/night sweats/alopecia/sinus disease/asthma history/oral ulcers/dysphagia/vision changes/Raynauds/VTE/miscarraiges/sicca symptoms/urinary changes/edema/SOB/joint pain/swelling/jaw claudication/vision changes/rash/photosensitivity/morning stiffness    Endorses fevers/chills/weight loss/night sweats/alopecia/sinus disease/asthma history/oral ulcers/dysphagia/vision changes/Raynauds/VTE/miscarraiges/sicca symptoms/urinary changes/edema/SOB/joint pain/swelling/jaw claudication/vision changes/rash/photosensitivity/morning stiffness      MEDICATIONS  (STANDING):  apixaban 5 milliGRAM(s) Oral every 12 hours  atorvastatin 10 milliGRAM(s) Oral at bedtime  budesonide  80 MICROgram(s)/formoterol 4.5 MICROgram(s) Inhaler 2 Puff(s) Inhalation two times a day  dexAMETHasone     Tablet 6 milliGRAM(s) Oral daily  dextrose 5%. 1000 milliLiter(s) (100 mL/Hr) IV Continuous <Continuous>  dextrose 5%. 1000 milliLiter(s) (50 mL/Hr) IV Continuous <Continuous>  dextrose 50% Injectable 12.5 Gram(s) IV Push once  dextrose 50% Injectable 25 Gram(s) IV Push once  dextrose 50% Injectable 25 Gram(s) IV Push once  glucagon  Injectable 1 milliGRAM(s) IntraMuscular once  insulin lispro (ADMELOG) corrective regimen sliding scale   SubCutaneous at bedtime  insulin lispro (ADMELOG) corrective regimen sliding scale   SubCutaneous three times a day before meals  metoprolol tartrate 25 milliGRAM(s) Oral every 12 hours  montelukast 10 milliGRAM(s) Oral daily  remdesivir  IVPB 100 milliGRAM(s) IV Intermittent every 24 hours  remdesivir  IVPB   IV Intermittent   tamsulosin 0.4 milliGRAM(s) Oral at bedtime    MEDICATIONS  (PRN):  acetaminophen     Tablet .. 650 milliGRAM(s) Oral every 6 hours PRN Temp greater or equal to 38C (100.4F), Mild Pain (1 - 3)  albuterol    90 MICROgram(s) HFA Inhaler 2 Puff(s) Inhalation every 6 hours PRN for bronchospasm  aluminum hydroxide/magnesium hydroxide/simethicone Suspension 30 milliLiter(s) Oral every 4 hours PRN Dyspepsia  dextrose Oral Gel 15 Gram(s) Oral once PRN Blood Glucose LESS THAN 70 milliGRAM(s)/deciliter  guaiFENesin Oral Liquid (Sugar-Free) 200 milliGRAM(s) Oral every 6 hours PRN Cough  melatonin 3 milliGRAM(s) Oral at bedtime PRN Insomnia  ondansetron Injectable 4 milliGRAM(s) IV Push every 8 hours PRN Nausea and/or Vomiting      Allergies    No Known Allergies    Intolerances        PERTINENT MEDICATION HISTORY:      Social History:  Former smoker (20 Sep 2023 19:33)      PAST MEDICAL & SURGICAL HISTORY:  DM (diabetes mellitus)      HTN (hypertension)      HLD (hyperlipidemia)      Former smoker      COPD (chronic obstructive pulmonary disease)      Afib      Pulmonary fibrosis      No significant past surgical history          OCCUPATION:  [employer]AV[employer] HISTORY:    FAMILY HISTORY:  No pertinent family history in first degree relatives        Vital Signs Last 24 Hrs  T(C): 36.5 (22 Sep 2023 05:04), Max: 36.6 (21 Sep 2023 13:45)  T(F): 97.7 (22 Sep 2023 05:04), Max: 97.8 (21 Sep 2023 13:45)  HR: 43 (22 Sep 2023 06:45) (43 - 85)  BP: 118/72 (22 Sep 2023 06:45) (110/63 - 120/60)  BP(mean): --  RR: 18 (22 Sep 2023 05:04) (18 - 18)  SpO2: 98% (22 Sep 2023 05:04) (97% - 100%)    Parameters below as of 22 Sep 2023 05:04  Patient On (Oxygen Delivery Method): nasal cannula  O2 Flow (L/min): 2      Physical Exam:  General: No apparent distress  HEENT: EOMI, MMM  CVS: +S1/S2, RRR, no murmurs/rubs/gallops  Resp: CTA b/l. No crackles/wheezing  GI: Soft, NT/ND +BS  MSK: no swelling/warmth/erythema of the joints of the UE/LE  Neuro: AAOx3  Skin: no visible rashes    LABS:                        12.0   14.01 )-----------( 283      ( 22 Sep 2023 07:47 )             38.1     09-22    131<L>  |  98  |  30<H>  ----------------------------<  97  5.0   |  24  |  1.04    Ca    8.6      22 Sep 2023 07:47  Phos  3.0     09-22  Mg     2.70     09-22    TPro  7.4  /  Alb  2.7<L>  /  TBili  0.3  /  DBili  x   /  AST  13  /  ALT  20  /  AlkPhos  53  09-22    PT/INR - ( 20 Sep 2023 13:38 )   PT: 19.7 sec;   INR: 1.79 ratio         PTT - ( 20 Sep 2023 13:38 )  PTT:30.0 sec  Urinalysis Basic - ( 22 Sep 2023 07:47 )    Color: x / Appearance: x / SG: x / pH: x  Gluc: 97 mg/dL / Ketone: x  / Bili: x / Urobili: x   Blood: x / Protein: x / Nitrite: x   Leuk Esterase: x / RBC: x / WBC x   Sq Epi: x / Non Sq Epi: x / Bacteria: x            Rheumatology Work Up    C-Reactive Protein, Serum: 175.9 mg/L *H* (09-21-23 @ 07:19)  Centromere Antibody: <0.2 AI [Fluorescent Bead Immunoassay                      Centomere B Antibodies, IgG                      <1.0 AI (negative)                      > or =1.0 AI (positive)                      Reference values apply to all  ages.] (09-01-23 @ 06:18)  Creatine Kinase, Serum: 111 U/L [30 - 200] (09-01-23 @ 06:18)  Cytoplasmic (c-ANCA) Antibody: Negative (09-01-23 @ 06:18)    Myomarker Panel 3 Plus (09.01.23 @ 06:18)    Anti-SS-A 52 kD Ab, IgG Plus: 39: This test was developed and its performance characteristics  determined by [facility]. It has not been cleared or  approved by the Food and Drug Administration. Units    SUKUMAR Antibody Screening Test (09.01.23 @ 06:18)    SM ([facility]) Ab FBIA: <0.2 AI    Anti-Ribonuclear Protein: <0.2: Fluorescent Bead Immunoassy                      Reference Ranges for RNP and SM:                      <1.0 AI (negative)                      > or =1.0 AI (positive)                      Reference values apply to all ages AI    [place]-1 Antibody (09.01.23 @ 06:18)    [place]-1 Antibody: <0.2: Fluorescent Bead Immunoassay                       [place] 1 Antibodies, IgG                      <1.0 AI (negative)                      > or =1.0 AI (positive)                      Reference values apply to all ages. AI    Sjogren&#x27;s Syndrome Antibodies (09.01.23 @ 06:18)    Anti SS-A Antibody: <0.2 AI   Anti SS-B Antibody: <0.2: Fluorescent Bead Immunoassay   Reference Ranges for SS-A AND SS-B:   <1.0 AI (negative)   > or =1.0 AI (positive)   Reference values apply  to all ages. AI    Double Stranded DNA Antibody (09.01.23 @ 06:18)    Double Stranded DNA Antibody: <12: Method: EIA            Reference Ranges            Interpretation            <= 29    IU/mL     Negative            30 - 75  IU/mL     Borderline            > 75      IU/mL     Positive IU/mL    Scleroderma Antibodies: <0.2: Fluorescent Bead Immunoassay                       Scl 70  Antibodies, IgG                      <1.0 AI (negative)                      > or =1.0 AI (positive)                      Reference values apply to all ages. AI (09.01.23 @ 06:18)        RADIOLOGY & ADDITIONAL STUDIES:    < from: Xray Chest 1 View- PORTABLE-Urgent (Xray Chest 1 View- PORTABLE-Urgent .) (09.20.23 @ 15:18) >  Pulmonary fibrosis without focal consolidation.      < end of copied text >  < from: CT Chest No Cont (08.30.23 @ 11:23) >    IMPRESSION:  Fibrotic interstitial lung disease, imaging pattern suggesting UIP.   Pulmonary nodulesmeasuring up to 5 mm, can be reassessed on a follow-up   chest CT in 12 months, in the absence of prior imaging for comparison.    Small pericardial effusion. Tiny focus of pericardial air; correlate with   recent intervention.    < end of copied text >   ***consult has been received. 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 The patient was previously hospitalized 3  weeks ago due to dyspnea, during which a chest CT revealed fibrotic interstitial lung disease, indicating UIP pattern. He was scheduled for an outpatient pulmonology appointment, but the patient returned to the ED on September 20th, reporting dyspnea and a cough persisting for the past 4-5 days. While in the ED, the patient tested positive for COVID-19. A chest X-ray showed no acute changes, and the patient denied experiencing fever, chills, rhinorrhea, sore throat, abdominal pain, rash, joint pain, sicca symptoms, or Raynaud's phenomenon. Undergoing Rheumatology consult for possible CTDs.     No family history of CTDs    Hospital course:   - Pt was started on Remdesivir for 5 days and dexamethasone 6 mg IV for 10 days   - Pulm Recommended antifibrotic treatment as outpatient, rheum consult   - Currently on     MEDICATIONS  (STANDING):  apixaban 5 milliGRAM(s) Oral every 12 hours  atorvastatin 10 milliGRAM(s) Oral at bedtime  budesonide  80 MICROgram(s)/formoterol 4.5 MICROgram(s) Inhaler 2 Puff(s) Inhalation two times a day  dexAMETHasone     Tablet 6 milliGRAM(s) Oral daily  dextrose 5%. 1000 milliLiter(s) (100 mL/Hr) IV Continuous <Continuous>  dextrose 5%. 1000 milliLiter(s) (50 mL/Hr) IV Continuous <Continuous>  dextrose 50% Injectable 12.5 Gram(s) IV Push once  dextrose 50% Injectable 25 Gram(s) IV Push once  dextrose 50% Injectable 25 Gram(s) IV Push once  glucagon  Injectable 1 milliGRAM(s) IntraMuscular once  insulin lispro (ADMELOG) corrective regimen sliding scale   SubCutaneous at bedtime  insulin lispro (ADMELOG) corrective regimen sliding scale   SubCutaneous three times a day before meals  metoprolol tartrate 25 milliGRAM(s) Oral every 12 hours  montelukast 10 milliGRAM(s) Oral daily  remdesivir  IVPB 100 milliGRAM(s) IV Intermittent every 24 hours  remdesivir  IVPB   IV Intermittent   tamsulosin 0.4 milliGRAM(s) Oral at bedtime    MEDICATIONS  (PRN):  acetaminophen     Tablet .. 650 milliGRAM(s) Oral every 6 hours PRN Temp greater or equal to 38C (100.4F), Mild Pain (1 - 3)  albuterol    90 MICROgram(s) HFA Inhaler 2 Puff(s) Inhalation every 6 hours PRN for bronchospasm  aluminum hydroxide/magnesium hydroxide/simethicone Suspension 30 milliLiter(s) Oral every 4 hours PRN Dyspepsia  dextrose Oral Gel 15 Gram(s) Oral once PRN Blood Glucose LESS THAN 70 milliGRAM(s)/deciliter  guaiFENesin Oral Liquid (Sugar-Free) 200 milliGRAM(s) Oral every 6 hours PRN Cough  melatonin 3 milliGRAM(s) Oral at bedtime PRN Insomnia  ondansetron Injectable 4 milliGRAM(s) IV Push every 8 hours PRN Nausea and/or Vomiting      Allergies    No Known Allergies    Intolerances        PERTINENT MEDICATION HISTORY:      Social History:  Former smoker (20 Sep 2023 19:33)      PAST MEDICAL & SURGICAL HISTORY:  DM (diabetes mellitus)      HTN (hypertension)      HLD (hyperlipidemia)      Former smoker      COPD (chronic obstructive pulmonary disease)      Afib      Pulmonary fibrosis      No significant past surgical history          OCCUPATION:  [employer]AV[employer] HISTORY:    FAMILY HISTORY:  No pertinent family history in first degree relatives        Vital Signs Last 24 Hrs  T(C): 36.5 (22 Sep 2023 05:04), Max: 36.6 (21 Sep 2023 13:45)  T(F): 97.7 (22 Sep 2023 05:04), Max: 97.8 (21 Sep 2023 13:45)  HR: 43 (22 Sep 2023 06:45) (43 - 85)  BP: 118/72 (22 Sep 2023 06:45) (110/63 - 120/60)  BP(mean): --  RR: 18 (22 Sep 2023 05:04) (18 - 18)  SpO2: 98% (22 Sep 2023 05:04) (97% - 100%)    Parameters below as of 22 Sep 2023 05:04  Patient On (Oxygen Delivery Method): nasal cannula  O2 Flow (L/min): 2      Physical Exam:  General: No apparent distress  HEENT: EOMI, MMM  CVS: +S1/S2, RRR, no murmurs/rubs/gallops  Resp: CTA b/l. No crackles/wheezing  GI: Soft, NT/ND +BS  MSK: no swelling/warmth/erythema of the joints of the UE/LE  Neuro: AAOx3  Skin: no visible rashes    LABS:                        12.0   14.01 )-----------( 283      ( 22 Sep 2023 07:47 )             38.1     09-22    131<L>  |  98  |  30<H>  ----------------------------<  97  5.0   |  24  |  1.04    Ca    8.6      22 Sep 2023 07:47  Phos  3.0     09-22  Mg     2.70     09-22    TPro  7.4  /  Alb  2.7<L>  /  TBili  0.3  /  DBili  x   /  AST  13  /  ALT  20  /  AlkPhos  53  09-22    PT/INR - ( 20 Sep 2023 13:38 )   PT: 19.7 sec;   INR: 1.79 ratio         PTT - ( 20 Sep 2023 13:38 )  PTT:30.0 sec  Urinalysis Basic - ( 22 Sep 2023 07:47 )    Color: x / Appearance: x / SG: x / pH: x  Gluc: 97 mg/dL / Ketone: x  / Bili: x / Urobili: x   Blood: x / Protein: x / Nitrite: x   Leuk Esterase: x / RBC: x / WBC x   Sq Epi: x / Non Sq Epi: x / Bacteria: x            Rheumatology Work Up    C-Reactive Protein, Serum: 175.9 mg/L *H* (09-21-23 @ 07:19)  Centromere Antibody: <0.2 AI [Fluorescent Bead Immunoassay                      Centomere B Antibodies, IgG                      <1.0 AI (negative)                      > or =1.0 AI (positive)                      Reference values apply to all  ages.] (09-01-23 @ 06:18)  Creatine Kinase, Serum: 111 U/L [30 - 200] (09-01-23 @ 06:18)  Cytoplasmic (c-ANCA) Antibody: Negative (09-01-23 @ 06:18)    Myomarker Panel 3 Plus (09.01.23 @ 06:18)    Anti-SS-A 52 kD Ab, IgG Plus: 39: This test was developed and its performance characteristics  determined by [facility]. It has not been cleared or  approved by the Food and Drug Administration. Units    SUKUMAR Antibody Screening Test (09.01.23 @ 06:18)    SM ([facility]) Ab FBIA: <0.2 AI    Anti-Ribonuclear Protein: <0.2: Fluorescent Bead Immunoassy                      Reference Ranges for RNP and SM:                      <1.0 AI (negative)                      > or =1.0 AI (positive)                      Reference values apply to all ages AI    [place]-1 Antibody (09.01.23 @ 06:18)    [place]-1 Antibody: <0.2: Fluorescent Bead Immunoassay                       [place] 1 Antibodies, IgG                      <1.0 AI (negative)                      > or =1.0 AI (positive)                      Reference values apply to all ages. AI    Sjogren&#x27;s Syndrome Antibodies (09.01.23 @ 06:18)    Anti SS-A Antibody: <0.2 AI   Anti SS-B Antibody: <0.2: Fluorescent Bead Immunoassay   Reference Ranges for SS-A AND SS-B:   <1.0 AI (negative)   > or =1.0 AI (positive)   Reference values apply  to all ages. AI    Double Stranded DNA Antibody (09.01.23 @ 06:18)    Double Stranded DNA Antibody: <12: Method: EIA            Reference Ranges            Interpretation            <= 29    IU/mL     Negative            30 - 75  IU/mL     Borderline            > 75      IU/mL     Positive IU/mL    Scleroderma Antibodies: <0.2: Fluorescent Bead Immunoassay                       Scl 70  Antibodies, IgG                      <1.0 AI (negative)                      > or =1.0 AI (positive)                      Reference values apply to all ages. AI (09.01.23 @ 06:18)        RADIOLOGY & ADDITIONAL STUDIES:    < from: Xray Chest 1 View- PORTABLE-Urgent (Xray Chest 1 View- PORTABLE-Urgent .) (09.20.23 @ 15:18) >  Pulmonary fibrosis without focal consolidation.      < end of copied text >  < from: CT Chest No Cont (08.30.23 @ 11:23) >    IMPRESSION:  Fibrotic interstitial lung disease, imaging pattern suggesting UIP.   Pulmonary nodulesmeasuring up to 5 mm, can be reassessed on a follow-up   chest CT in 12 months, in the absence of prior imaging for comparison.    Small pericardial effusion. Tiny focus of pericardial air; correlate with   recent intervention.    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 The patient was previously hospitalized 3  weeks ago due to dyspnea, during which a chest CT revealed fibrotic interstitial lung disease, indicating UIP pattern. He was scheduled for an outpatient pulmonology appointment, but the patient returned to the ED on September 20th, reporting dyspnea and a cough persisting for the past 4-5 days. While in the ED, the patient tested positive for COVID-19. A chest X-ray showed no acute changes, and the patient denied experiencing fever, chills, rhinorrhea, sore throat, abdominal pain, rash, joint pain, sicca symptoms, or Raynaud's phenomenon. Undergoing Rheumatology consult for possible CTDs.     No family history of CTDs    Hospital course:   - Pt was started on Remdesivir for 5 days and dexamethasone 6 mg IV for 10 days   - Pulm Recommended antifibrotic treatment as outpatient, rheum consult     MEDICATIONS  (STANDING):  apixaban 5 milliGRAM(s) Oral every 12 hours  atorvastatin 10 milliGRAM(s) Oral at bedtime  budesonide  80 MICROgram(s)/formoterol 4.5 MICROgram(s) Inhaler 2 Puff(s) Inhalation two times a day  dexAMETHasone     Tablet 6 milliGRAM(s) Oral daily  dextrose 5%. 1000 milliLiter(s) (100 mL/Hr) IV Continuous <Continuous>  dextrose 5%. 1000 milliLiter(s) (50 mL/Hr) IV Continuous <Continuous>  dextrose 50% Injectable 12.5 Gram(s) IV Push once  dextrose 50% Injectable 25 Gram(s) IV Push once  dextrose 50% Injectable 25 Gram(s) IV Push once  glucagon  Injectable 1 milliGRAM(s) IntraMuscular once  insulin lispro (ADMELOG) corrective regimen sliding scale   SubCutaneous at bedtime  insulin lispro (ADMELOG) corrective regimen sliding scale   SubCutaneous three times a day before meals  metoprolol tartrate 25 milliGRAM(s) Oral every 12 hours  montelukast 10 milliGRAM(s) Oral daily  remdesivir  IVPB 100 milliGRAM(s) IV Intermittent every 24 hours  remdesivir  IVPB   IV Intermittent   tamsulosin 0.4 milliGRAM(s) Oral at bedtime    MEDICATIONS  (PRN):  acetaminophen     Tablet .. 650 milliGRAM(s) Oral every 6 hours PRN Temp greater or equal to 38C (100.4F), Mild Pain (1 - 3)  albuterol    90 MICROgram(s) HFA Inhaler 2 Puff(s) Inhalation every 6 hours PRN for bronchospasm  aluminum hydroxide/magnesium hydroxide/simethicone Suspension 30 milliLiter(s) Oral every 4 hours PRN Dyspepsia  dextrose Oral Gel 15 Gram(s) Oral once PRN Blood Glucose LESS THAN 70 milliGRAM(s)/deciliter  guaiFENesin Oral Liquid (Sugar-Free) 200 milliGRAM(s) Oral every 6 hours PRN Cough  melatonin 3 milliGRAM(s) Oral at bedtime PRN Insomnia  ondansetron Injectable 4 milliGRAM(s) IV Push every 8 hours PRN Nausea and/or Vomiting      Allergies    No Known Allergies    Intolerances        PERTINENT MEDICATION HISTORY:      Social History:  Former smoker (20 Sep 2023 19:33)      PAST MEDICAL & SURGICAL HISTORY:  DM (diabetes mellitus)      HTN (hypertension)      HLD (hyperlipidemia)      Former smoker      COPD (chronic obstructive pulmonary disease)      Afib      Pulmonary fibrosis      No significant past surgical history          OCCUPATION:  [employer] HISTORY:    FAMILY HISTORY:  No pertinent family history in first degree relatives        Vital Signs Last 24 Hrs  T(C): 36.5 (22 Sep 2023 05:04), Max: 36.6 (21 Sep 2023 13:45)  T(F): 97.7 (22 Sep 2023 05:04), Max: 97.8 (21 Sep 2023 13:45)  HR: 43 (22 Sep 2023 06:45) (43 - 85)  BP: 118/72 (22 Sep 2023 06:45) (110/63 - 120/60)  BP(mean): --  RR: 18 (22 Sep 2023 05:04) (18 - 18)  SpO2: 98% (22 Sep 2023 05:04) (97% - 100%)    Parameters below as of 22 Sep 2023 05:04  Patient On (Oxygen Delivery Method): nasal cannula  O2 Flow (L/min): 2      Physical Exam:  General: No apparent distress  HEENT: EOMI, MMM  CVS: +S1/S2, RRR, no murmurs/rubs/gallops  Resp: CTA b/l. B/L crackles from mid to lower zones   GI: Soft, NT/ND +BS  MSK:Left shoulder and both knee ROM is limited, crepitus on the exam  Neuro: AAOx3  Skin: no visible rashes    LABS:                        12.0   14.01 )-----------( 283      ( 22 Sep 2023 07:47 )             38.1     09-22    131<L>  |  98  |  30<H>  ----------------------------<  97  5.0   |  24  |  1.04    Ca    8.6      22 Sep 2023 07:47  Phos  3.0     09-22  Mg     2.70     09-22    TPro  7.4  /  Alb  2.7<L>  /  TBili  0.3  /  DBili  x   /  AST  13  /  ALT  20  /  AlkPhos  53  09-22    PT/INR - ( 20 Sep 2023 13:38 )   PT: 19.7 sec;   INR: 1.79 ratio         PTT - ( 20 Sep 2023 13:38 )  PTT:30.0 sec  Urinalysis Basic - ( 22 Sep 2023 07:47 )    Color: x / Appearance: x / SG: x / pH: x  Gluc: 97 mg/dL / Ketone: x  / Bili: x / Urobili: x   Blood: x / Protein: x / Nitrite: x   Leuk Esterase: x / RBC: x / WBC x   Sq Epi: x / Non Sq Epi: x / Bacteria: x            Rheumatology Work Up    C-Reactive Protein, Serum: 175.9 mg/L *H* (09-21-23 @ 07:19)  Centromere Antibody: <0.2 AI [Fluorescent Bead Immunoassay                      Centomere B Antibodies, IgG                      <1.0 AI (negative)                      > or =1.0 AI (positive)                      Reference values apply to all  ages.] (09-01-23 @ 06:18)  Creatine Kinase, Serum: 111 U/L [30 - 200] (09-01-23 @ 06:18)  Cytoplasmic (c-ANCA) Antibody: Negative (09-01-23 @ 06:18)    Myomarker Panel 3 Plus (09.01.23 @ 06:18)    Anti-SS-A 52 kD Ab, IgG Plus: 39: This test was developed and its performance characteristics  determined by [facility]. It has not been cleared or  approved by the Food and Drug Administration. Units    SUKUMAR Antibody Screening Test (09.01.23 @ 06:18)    SM ([facility]) Ab FBIA: <0.2 AI    Anti-Ribonuclear Protein: <0.2: Fluorescent Bead Immunoassy                      Reference Ranges for RNP and SM:                      <1.0 AI (negative)                      > or =1.0 AI (positive)                      Reference values apply to all ages AI    [place]-1 Antibody (09.01.23 @ 06:18)    [place]-1 Antibody: <0.2: Fluorescent Bead Immunoassay                       [place] 1 Antibodies, IgG                      <1.0 AI (negative)                      > or =1.0 AI (positive)                      Reference values apply to all ages. AI    Sjogren&#x27;s Syndrome Antibodies (09.01.23 @ 06:18)    Anti SS-A Antibody: <0.2 AI   Anti SS-B Antibody: <0.2: Fluorescent Bead Immunoassay   Reference Ranges for SS-A AND SS-B:   <1.0 AI (negative)   > or =1.0 AI (positive)   Reference values apply  to all ages. AI    Double Stranded DNA Antibody (09.01.23 @ 06:18)    Double Stranded DNA Antibody: <12: Method: EIA            Reference Ranges            Interpretation            <= 29    IU/mL     Negative            30 - 75  IU/mL     Borderline            > 75      IU/mL     Positive IU/mL    Scleroderma Antibodies: <0.2: Fluorescent Bead Immunoassay                       Scl 70  Antibodies, IgG                      <1.0 AI (negative)                      > or =1.0 AI (positive)                      Reference values apply to all ages. AI (09.01.23 @ 06:18)        RADIOLOGY & ADDITIONAL STUDIES:    < from: Xray Chest 1 View- PORTABLE-Urgent (Xray Chest 1 View- PORTABLE-Urgent .) (09.20.23 @ 15:18) >  Pulmonary fibrosis without focal consolidation.      < end of copied text >  < from: CT Chest No Cont (08.30.23 @ 11:23) >    IMPRESSION:  Fibrotic interstitial lung disease, imaging pattern suggesting UIP.   Pulmonary nodulesmeasuring up to 5 mm, can be reassessed on a follow-up   chest CT in 12 months, in the absence of prior imaging for comparison.    Small pericardial effusion. Tiny focus of pericardial air; correlate with   recent intervention.    < end of copied text >

## 2023-09-22 NOTE — PROVIDER CONTACT NOTE (OTHER) - ASSESSMENT
Pt HR per cardiac monitor down to 43.   /72  Pt stable and asymptomatic
no complaints
pt denies dizziness, chest pain, and shortness of breath.

## 2023-09-22 NOTE — PROVIDER CONTACT NOTE (OTHER) - ACTION/TREATMENT ORDERED:
will order metoprolol and reassess
ACP made aware. No new orders at this time. Continuos monitoring in place.
As per provider, no further interventions at this time. RN to administer ordered AM meds as scheduled.

## 2023-09-22 NOTE — PHYSICAL THERAPY INITIAL EVALUATION ADULT - GAIT DEVIATIONS NOTED, PT EVAL
decreased jadon/decreased step length/decreased stride length Burow's Graft Text: The defect edges were debeveled with a #15 scalpel blade.  Given the location of the defect, shape of the defect, the proximity to free margins and the presence of a standing cone deformity a Burow's skin graft was deemed most appropriate. The standing cone was removed and this tissue was then trimmed to the shape of the primary defect. The adipose tissue was also removed until only dermis and epidermis were left.  The skin margins of the secondary defect were undermined to an appropriate distance in all directions utilizing iris scissors.  The secondary defect was closed with interrupted buried subcutaneous sutures.  The skin edges were then re-apposed with running  sutures.  The skin graft was then placed in the primary defect and oriented appropriately.

## 2023-09-22 NOTE — PROGRESS NOTE ADULT - PROBLEM SELECTOR PLAN 6
Patient with hx Afib   -increase metoprolol 25 mg to q8 for now, given RVR periodically   -C/w eliquis 5 mg BID Patient with hx Afib   - if needed increased metoprolol 25 mg to q8 for RVR    -C/w eliquis 5 mg BID

## 2023-09-22 NOTE — PROGRESS NOTE ADULT - PROBLEM SELECTOR PLAN 3
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

## 2023-09-22 NOTE — PHYSICAL THERAPY INITIAL EVALUATION ADULT - RANGE OF MOTION EXAMINATION, REHAB EVAL
except left shoulder flexion to at least 70 degrees (states this is baseline)/bilateral upper extremity ROM was WFL (within functional limits)/bilateral lower extremity ROM was WFL (within functional limits)

## 2023-09-22 NOTE — PHYSICAL THERAPY INITIAL EVALUATION ADULT - ADL SKILLS, REHAB EVAL
[facility]Grant-Blackford Mental Health [phone]. [phone] (home)  
 Complete- we'll have to make sure preop gets out to both places.   
Add additional dates/info to preop for 2nd procedure and then i'll sign off/addend it  
Addended pre op physical faxed to [facility] at [phone], pre op physical also place at   for , patient advised.  
MR, pt seen 10/18/2022 for pre op for Carpal tunnel surger.  Pt now having Glaucoma surgery (RIGHT TUBE SHUNT) through [facility] on 11/03/22- would like to know if Pre Op can be addended to approve upcoming surgery.    Please review and advise if approved, would like pre op faxed to [phone]    Carolynn Metcalf CMA    
Note updated with additional surgery information    Carolynn Metcalf, CMA    
Patient  the form today.  
independent

## 2023-09-22 NOTE — PHYSICAL THERAPY INITIAL EVALUATION ADULT - PRECAUTIONS/LIMITATIONS, REHAB EVAL
fall precautions/oxygen therapy device and L/min airborne precautions/fall precautions/oxygen therapy device and L/min

## 2023-09-22 NOTE — PHYSICAL THERAPY INITIAL EVALUATION ADULT - GAIT DISTANCE, PT EVAL
50 feet within room while on portable O2 n/c, c/o mild SOB towards end of ambulation however resolved with rest and deep breathing

## 2023-09-22 NOTE — PHYSICAL THERAPY INITIAL EVALUATION ADULT - PERTINENT HX OF CURRENT PROBLEM, REHAB EVAL
88 y/o Male with PMhx of ILD, HTN, DMII, afib on eliquis, and HLD presented to the ED with worsening SOB, found to have COVID-19.

## 2023-09-22 NOTE — PHYSICAL THERAPY INITIAL EVALUATION ADULT - LEVEL OF INDEPENDENCE: GAIT, REHAB EVAL
Pt is discharged home at this time. Pt verbalizes understanding of DC instructions and follow up care. Pt respirations even and unlabored. Pt ambulates out of department with steady, even gait.  
Report given to FITO Cuellar.   
SPINE SURGERY CONSULT NOTE    We were asked to see Jose Angel at the request of Kishan Latif for a  consultation regarding his Thoracic spine.     Fall: November 2019, fell down 10 stairs in [place]. Resulting in C3 TP Fx, scalp lac, Left 4th PIP injury, small SDH, with memory and gait unsteadiness.     Acute / ICU Admit: 12/10/2019 SDH (subdural hematoma)   s/p craniotomy for subdural hematoma evacuation (12/11/2019; Dr. Vyas)    IRP  12/13/2019  Subdural hemorrhage    12/14/2019 IRP H&P: M: functional and symmetric  (slightly weaker  on Left);  S: SILT. Reflexes: No  pathologic. Coordination: grossly intact.     Acute Re-Admission Date: 12/15/2019 for Acute new onset seizure disorder possible sec to Recent Event of Subdural hematoma with severe headache.    12/16/2016 @1200: Neurology L>R weakness, difficulty with dexterity/picking up pills   12/16/2019 @1556. Seizure activity (tonic/clonic)     12/16/2019 @1746 Stroke Team: Left side deficits noted after ambulating pt to BR; L facial droop, slurred speech, vision changes, Left neglect      CC:  T2 comp fx; questioning stability.  Left C3 lateral mass fracture    HPI: Patient is a 76 year old male  who fell down stairs.  He was found to have a left C3 lateral mass fracture.  A recent CT scan revealed evidence of a T2 compression fracture.  Currently, Jose has pain localized to the upper thoracic region without significant radiation.  He has profound left upper extremity weakness.    We have been consulted to evaluate potential surgical need.     Symptoms are:   [x]  Constant  []  Comes and goes      Type of pain:  []  Sharp  []  Dull  []  Throbbing  [x]  Ache  []  Shooting  []  Burning  []  Numbness/Tingling             Interferes with:  []  Sitting  []  Standing  []  Bending  []  Walking  []  Sleeping  []  Work  []  Recreation             Treatments tried:  []  Medication  []  Exercise  []  Physical Therapy  []  Chiropractor  []  Acupuncture  []  Steroid 
[facility] 
  • Need for pneumococcal vaccine     pvax    • Need for zoster vaccine     zvax   • Pancreatic cancer (CMS/HCC)     XRT, chemo, Whipple  by Dr High, who follows; [facility] Dr Villa saw  rec f/u prn with her   • Rash, skin     Derm Dr Makayla Royal has seen incl , on topicals, apparently skin bx late    • Screening for lipoid disorders     11/10 , TG86, HDL60, OCF907   • Steatohepatitis, non-alcoholic    • Wears glasses        Past Surgical History:  Past Surgical History:   Procedure Laterality Date   • Colonoscopy     • Craniotomy Right 2019    Subdural hematoma evacuation   • Ercp  2011   • Esophagogastroduodenoscopy  3/14/2016    EGD and endoscopic ultrasound   • Removal gallbladder      Cholecystectomy    • Skin biopsy     • Tonsillectomy and adenoidectomy  as a child   • Total hip replacement Left 2014    Total Hip Replacement; Dr. Whipple, [facility]   • Whipple procedure w/ laparoscopy  10/2004    pancreaticoduodenectomy done with whipple       Family History:  Family Status   Relation Name Status   • Mo     • Fa     • Bro  Alive   • Son  Alive   • Bro  Alive   • Son  Alive   • Son  Alive   • MGMo     • MGFa     • PGMo     • PGFa         Social History:    Tobacco Use: Quit          Packs/Day: 0     Years:            Quit date: 1975       Comment: quit smoking in his 20's      Medications Prior to Admission   Medication Sig Dispense Refill   • Multiple Vitamins-Minerals (VITAMIN - THERAPEUTIC MULTIVITAMINS W/MINERALS) tablet Take 1 tablet by mouth daily.     • Insulin Degludec (TRESIBA SC) Inject 6 Units into the skin nightly.     • zolpidem (AMBIEN) 10 MG tablet Take 1 tablet by mouth nightly as needed for Sleep. 90 tablet 1   • albuterol 108 (90 Base) MCG/ACT inhaler Inhale 2 puffs into the lungs every 4 hours as needed for Shortness of Breath or Wheezing. 1 Inhaler 0   • CYANOCOBALAMIN SL Place 200 mcg 
contact guard

## 2023-09-22 NOTE — CONSULT NOTE ADULT - ATTENDING COMMENTS
87M PMH HTN, HLD, DM2 (not insulin dependent), A-Fib on apixaban, former smoker with COPD, and ILD (UIP pattern, autoimmune workup positive for SSA-52) who presents with dyspnea and cough, found to have COVID-19 viral infection with mild hypoxemia.    - Complete 5-day course of Remdesivir  - Dexamethasone 6 mg daily for 10 days  - Supplemental oxygen with NC@1-2 LPM, goal SpO2>90%  - Continue Symbicort, would increase to 160-4.5 mcg 2 puffs twice daily, rinse after use  - Also takes montelukast 10 mg daily  - Consider rheumatology eval given elevated SSA-52 in the setting of UIP pattern ILD, possible underlying connective tissue disease  - Would consider starting antifibrotic therapy as outpatient  - Needs outpatient PFTs to assess presence/severity of restrictive lung disease due to ILD  - DVT ppx, on apixaban  - Discussed with patient and family at bedside  - Will sign off, please call back with any questions  - Outpatient follow-up with pulmonary after discharge ([phone])
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  No further intervention as inpatient.  Outpatient follow up   Signing off.    Dr. Millie Pierson  Rheumatology Attending  [phone]  [email or web address]

## 2023-09-23 LAB
ALBUMIN SERPL ELPH-MCNC: 2.7 G/DL — LOW (ref 3.3–5)
ALP SERPL-CCNC: 54 U/L — SIGNIFICANT CHANGE UP (ref 40–120)
ALT FLD-CCNC: 16 U/L — SIGNIFICANT CHANGE UP (ref 4–41)
ANION GAP SERPL CALC-SCNC: 8 MMOL/L — SIGNIFICANT CHANGE UP (ref 7–14)
AST SERPL-CCNC: 11 U/L — SIGNIFICANT CHANGE UP (ref 4–40)
BILIRUB SERPL-MCNC: 0.3 MG/DL — SIGNIFICANT CHANGE UP (ref 0.2–1.2)
BUN SERPL-MCNC: 32 MG/DL — HIGH (ref 7–23)
CALCIUM SERPL-MCNC: 8.4 MG/DL — SIGNIFICANT CHANGE UP (ref 8.4–10.5)
CHLORIDE SERPL-SCNC: 99 MMOL/L — SIGNIFICANT CHANGE UP (ref 98–107)
CO2 SERPL-SCNC: 24 MMOL/L — SIGNIFICANT CHANGE UP (ref 22–31)
CREAT SERPL-MCNC: 0.92 MG/DL — SIGNIFICANT CHANGE UP (ref 0.5–1.3)
EGFR: 81 ML/MIN/1.73M2 — SIGNIFICANT CHANGE UP
GLUCOSE BLDC GLUCOMTR-MCNC: 121 MG/DL — HIGH (ref 70–99)
GLUCOSE BLDC GLUCOMTR-MCNC: 121 MG/DL — HIGH (ref 70–99)
GLUCOSE BLDC GLUCOMTR-MCNC: 164 MG/DL — HIGH (ref 70–99)
GLUCOSE BLDC GLUCOMTR-MCNC: 236 MG/DL — HIGH (ref 70–99)
GLUCOSE SERPL-MCNC: 120 MG/DL — HIGH (ref 70–99)
HCT VFR BLD CALC: 34.2 % — LOW (ref 39–50)
HGB BLD-MCNC: 11.3 G/DL — LOW (ref 13–17)
MAGNESIUM SERPL-MCNC: 2.5 MG/DL — SIGNIFICANT CHANGE UP (ref 1.6–2.6)
MCHC RBC-ENTMCNC: 29.9 PG — SIGNIFICANT CHANGE UP (ref 27–34)
MCHC RBC-ENTMCNC: 33 GM/DL — SIGNIFICANT CHANGE UP (ref 32–36)
MCV RBC AUTO: 90.5 FL — SIGNIFICANT CHANGE UP (ref 80–100)
NRBC # BLD: 0 /100 WBCS — SIGNIFICANT CHANGE UP (ref 0–0)
NRBC # FLD: 0 K/UL — SIGNIFICANT CHANGE UP (ref 0–0)
PHOSPHATE SERPL-MCNC: 2.8 MG/DL — SIGNIFICANT CHANGE UP (ref 2.5–4.5)
PLATELET # BLD AUTO: 264 K/UL — SIGNIFICANT CHANGE UP (ref 150–400)
POTASSIUM SERPL-MCNC: 4.7 MMOL/L — SIGNIFICANT CHANGE UP (ref 3.5–5.3)
POTASSIUM SERPL-SCNC: 4.7 MMOL/L — SIGNIFICANT CHANGE UP (ref 3.5–5.3)
PROT SERPL-MCNC: 7 G/DL — SIGNIFICANT CHANGE UP (ref 6–8.3)
RBC # BLD: 3.78 M/UL — LOW (ref 4.2–5.8)
RBC # FLD: 13.2 % — SIGNIFICANT CHANGE UP (ref 10.3–14.5)
SODIUM SERPL-SCNC: 131 MMOL/L — LOW (ref 135–145)
WBC # BLD: 14.38 K/UL — HIGH (ref 3.8–10.5)
WBC # FLD AUTO: 14.38 K/UL — HIGH (ref 3.8–10.5)

## 2023-09-23 PROCEDURE — 99232 SBSQ HOSP IP/OBS MODERATE 35: CPT

## 2023-09-23 RX ADMIN — TAMSULOSIN HYDROCHLORIDE 0.4 MILLIGRAM(S): 0.4 CAPSULE ORAL at 22:00

## 2023-09-23 RX ADMIN — ATORVASTATIN CALCIUM 10 MILLIGRAM(S): 80 TABLET, FILM COATED ORAL at 21:59

## 2023-09-23 RX ADMIN — APIXABAN 5 MILLIGRAM(S): 2.5 TABLET, FILM COATED ORAL at 17:45

## 2023-09-23 RX ADMIN — BUDESONIDE AND FORMOTEROL FUMARATE DIHYDRATE 2 PUFF(S): 160; 4.5 AEROSOL RESPIRATORY (INHALATION) at 22:00

## 2023-09-23 RX ADMIN — BUDESONIDE AND FORMOTEROL FUMARATE DIHYDRATE 2 PUFF(S): 160; 4.5 AEROSOL RESPIRATORY (INHALATION) at 09:07

## 2023-09-23 RX ADMIN — Medication 2: at 13:03

## 2023-09-23 RX ADMIN — Medication 25 MILLIGRAM(S): at 05:10

## 2023-09-23 RX ADMIN — APIXABAN 5 MILLIGRAM(S): 2.5 TABLET, FILM COATED ORAL at 05:10

## 2023-09-23 RX ADMIN — MONTELUKAST 10 MILLIGRAM(S): 4 TABLET, CHEWABLE ORAL at 13:08

## 2023-09-23 RX ADMIN — Medication 6 MILLIGRAM(S): at 05:10

## 2023-09-23 RX ADMIN — REMDESIVIR 200 MILLIGRAM(S): 5 INJECTION INTRAVENOUS at 22:01

## 2023-09-23 RX ADMIN — Medication 25 MILLIGRAM(S): at 17:46

## 2023-09-23 RX ADMIN — Medication 1: at 17:44

## 2023-09-23 NOTE — PROGRESS NOTE ADULT - PROBLEM SELECTOR PLAN 2
Patient with worsening SOB found to have COVID-19   -With hypoxia requiring O2   -CXR shows chronic ILD   -continue dexamethasone and remdesivir IV    -Wean off oxygen as tolerated  - pulm consulted
Patient with worsening SOB found to have COVID-19   - With hypoxia requiring O2   - CXR shows chronic ILD   - Complete 5-day course of Remdesivir  - Dexamethasone 6 mg daily for 10 days  - Wean off oxygen as tolerated  - pulm consulted, recs appreciated
Patient with worsening SOB found to have COVID-19   - With hypoxia requiring O2   - CXR shows chronic ILD   - Complete 5-day course of Remdesivir  - Dexamethasone 6 mg daily for 10 days  - Wean off oxygen as tolerated  - pulm consulted, recs appreciated

## 2023-09-23 NOTE — PROGRESS NOTE ADULT - ASSESSMENT
88 yo M with PMhx of ILD, HTN, DMII, afib on eliquis, and HLD presents to the ED with worsening SOB, found to have COVID-19. 

## 2023-09-23 NOTE — PROGRESS NOTE ADULT - PROBLEM SELECTOR PLAN 5
30-Nov-2018 02:06
Chronic, at baseline   -Trend Na daily

## 2023-09-23 NOTE — PROGRESS NOTE ADULT - PROBLEM SELECTOR PLAN 8
DVT ppx eliquis mg BID
DVT ppx eliquis mg BID
DVT ppx eliquis mg BID    Will complete [facility] course on 9/24. Plan for DC tomorrow

## 2023-09-23 NOTE — PROGRESS NOTE ADULT - SUBJECTIVE AND OBJECTIVE BOX
Ellen Castellon MD  [facility] Division of [facility] Medicine  Pager: [phone]  Available via MS Teams    SUBJECTIVE / OVERNIGHT EVENTS:    Has occasional chest pain with ambulating   No chest pain currently       MEDICATIONS  (STANDING):  apixaban 5 milliGRAM(s) Oral every 12 hours  atorvastatin 10 milliGRAM(s) Oral at bedtime  budesonide  80 MICROgram(s)/formoterol 4.5 MICROgram(s) Inhaler 2 Puff(s) Inhalation two times a day  dexAMETHasone     Tablet 6 milliGRAM(s) Oral daily  dextrose 5%. 1000 milliLiter(s) (100 mL/Hr) IV Continuous <Continuous>  dextrose 5%. 1000 milliLiter(s) (50 mL/Hr) IV Continuous <Continuous>  dextrose 50% Injectable 25 Gram(s) IV Push once  dextrose 50% Injectable 12.5 Gram(s) IV Push once  dextrose 50% Injectable 25 Gram(s) IV Push once  glucagon  Injectable 1 milliGRAM(s) IntraMuscular once  metoprolol tartrate 25 milliGRAM(s) Oral every 12 hours  montelukast 10 milliGRAM(s) Oral daily  remdesivir  IVPB 100 milliGRAM(s) IV Intermittent every 24 hours  remdesivir  IVPB   IV Intermittent   tamsulosin 0.4 milliGRAM(s) Oral at bedtime    MEDICATIONS  (PRN):  acetaminophen     Tablet .. 650 milliGRAM(s) Oral every 6 hours PRN Temp greater or equal to 38C (100.4F), Mild Pain (1 - 3)  albuterol    90 MICROgram(s) HFA Inhaler 2 Puff(s) Inhalation every 6 hours PRN for bronchospasm  aluminum hydroxide/magnesium hydroxide/simethicone Suspension 30 milliLiter(s) Oral every 4 hours PRN Dyspepsia  dextrose Oral Gel 15 Gram(s) Oral once PRN Blood Glucose LESS THAN 70 milliGRAM(s)/deciliter  melatonin 3 milliGRAM(s) Oral at bedtime PRN Insomnia  ondansetron Injectable 4 milliGRAM(s) IV Push every 8 hours PRN Nausea and/or Vomiting      I&O's Summary      PHYSICAL EXAM:  Vital Signs Last 24 Hrs  T(C): 36.8 (21 Sep 2023 06:10), Max: 37 (20 Sep 2023 21:20)  T(F): 98.2 (21 Sep 2023 06:10), Max: 98.6 (20 Sep 2023 21:20)  HR: 110 (21 Sep 2023 06:10) (66 - 110)  BP: 113/80 (21 Sep 2023 06:10) (90/68 - 113/80)  BP(mean): --  RR: 19 (21 Sep 2023 06:10) (18 - 21)  SpO2: 98% (21 Sep 2023 06:10) (97% - 100%)    Parameters below as of 21 Sep 2023 06:10  Patient On (Oxygen Delivery Method): nasal cannula  O2 Flow (L/min): 2    CONSTITUTIONAL: NAD, well-developed   EYES: conjunctiva and sclera clear  ENMT: Moist oral mucosa   NECK: Supple   RESPIRATORY: some coarse crackles heard anteriorly   CARDIOVASCULAR: Regular rate and rhythm, normal S1 and S2, no murmur/rub/gallop; Peripheral pulses are 2+ bilaterally  ABDOMEN: Nontender to palpation, normoactive bowel sounds, no rebound/guarding   MUSCULOSKELETAL: No lower extremity edema   PSYCH: A+O to person, place, and time; affect appropriate  NEUROLOGY: no gross sensory or motor deficits   SKIN: No rashes    LABS:                        12.9   16.68 )-----------( 336      ( 21 Sep 2023 07:19 )             40.4     09-21    131<L>  |  94<L>  |  24<H>  ----------------------------<  115<H>  5.4<H>   |  20<L>  |  1.03    Ca    8.8      21 Sep 2023 07:19    TPro  8.3  /  Alb  3.2<L>  /  TBili  0.4  /  DBili  x   /  AST  16  /  ALT  24  /  AlkPhos  60  09-21    PT/INR - ( 20 Sep 2023 13:38 )   PT: 19.7 sec;   INR: 1.79 ratio         PTT - ( 20 Sep 2023 13:38 )  PTT:30.0 sec      Urinalysis Basic - ( 21 Sep 2023 07:19 )    Color: x / Appearance: x / SG: x / pH: x  Gluc: 115 mg/dL / Ketone: x  / Bili: x / Urobili: x   Blood: x / Protein: x / Nitrite: x   Leuk Esterase: x / RBC: x / WBC x   Sq Epi: x / Non Sq Epi: x / Bacteria: x        SARS-CoV-2: Detected (20 Sep 2023 13:40)  SARS-CoV-2: NotDetec (29 Aug 2023 19:35)      RADIOLOGY & ADDITIONAL TESTS:  Other Results Reviewed Today: BMP with stable Cr, CBC with stable Hg     COORDINATION OF CARE:  Communication: discussed plan of care with ACP   
Ellen Castellon MD  [facility] Division of [facility] Medicine  Pager: [phone]  Available via MS Teams    SUBJECTIVE / OVERNIGHT EVENTS:    No new complaints     MEDICATIONS  (STANDING):  apixaban 5 milliGRAM(s) Oral every 12 hours  atorvastatin 10 milliGRAM(s) Oral at bedtime  budesonide 160 MICROgram(s)/formoterol 4.5 MICROgram(s) Inhaler 2 Puff(s) Inhalation two times a day  dexAMETHasone     Tablet 6 milliGRAM(s) Oral daily  dextrose 5%. 1000 milliLiter(s) (100 mL/Hr) IV Continuous <Continuous>  dextrose 5%. 1000 milliLiter(s) (50 mL/Hr) IV Continuous <Continuous>  dextrose 50% Injectable 25 Gram(s) IV Push once  dextrose 50% Injectable 12.5 Gram(s) IV Push once  dextrose 50% Injectable 25 Gram(s) IV Push once  glucagon  Injectable 1 milliGRAM(s) IntraMuscular once  insulin lispro (ADMELOG) corrective regimen sliding scale   SubCutaneous three times a day before meals  insulin lispro (ADMELOG) corrective regimen sliding scale   SubCutaneous at bedtime  metoprolol tartrate 25 milliGRAM(s) Oral every 12 hours  montelukast 10 milliGRAM(s) Oral daily  remdesivir  IVPB 100 milliGRAM(s) IV Intermittent every 24 hours  remdesivir  IVPB   IV Intermittent   sodium zirconium cyclosilicate 10 Gram(s) Oral once  tamsulosin 0.4 milliGRAM(s) Oral at bedtime    MEDICATIONS  (PRN):  acetaminophen     Tablet .. 650 milliGRAM(s) Oral every 6 hours PRN Temp greater or equal to 38C (100.4F), Mild Pain (1 - 3)  albuterol    90 MICROgram(s) HFA Inhaler 2 Puff(s) Inhalation every 6 hours PRN for bronchospasm  aluminum hydroxide/magnesium hydroxide/simethicone Suspension 30 milliLiter(s) Oral every 4 hours PRN Dyspepsia  dextrose Oral Gel 15 Gram(s) Oral once PRN Blood Glucose LESS THAN 70 milliGRAM(s)/deciliter  guaiFENesin Oral Liquid (Sugar-Free) 200 milliGRAM(s) Oral every 6 hours PRN Cough  melatonin 3 milliGRAM(s) Oral at bedtime PRN Insomnia  ondansetron Injectable 4 milliGRAM(s) IV Push every 8 hours PRN Nausea and/or Vomiting      I&O's Summary    22 Sep 2023 07:01  -  23 Sep 2023 07:00  --------------------------------------------------------  IN: 720 mL / OUT: 1550 mL / NET: -830 mL        PHYSICAL EXAM:  Vital Signs Last 24 Hrs  T(C): 36.5 (23 Sep 2023 05:07), Max: 36.7 (22 Sep 2023 21:18)  T(F): 97.7 (23 Sep 2023 05:07), Max: 98.1 (22 Sep 2023 21:18)  HR: 59 (23 Sep 2023 05:07) (59 - 100)  BP: 130/81 (23 Sep 2023 05:07) (106/- - 130/81)  BP(mean): --  RR: 18 (23 Sep 2023 05:07) (18 - 18)  SpO2: 100% (23 Sep 2023 05:07) (96% - 100%)    Parameters below as of 23 Sep 2023 05:07  Patient On (Oxygen Delivery Method): nasal cannula      CONSTITUTIONAL: NAD, well-developed   EYES: conjunctiva and sclera clear  ENMT: Moist oral mucosa   NECK: Supple   RESPIRATORY: Normal respiratory effort; lungs are clear to auscultation bilaterally  CARDIOVASCULAR: Regular rate and rhythm, normal S1 and S2, no murmur/rub/gallop; Peripheral pulses are 2+ bilaterally  ABDOMEN: Nontender to palpation, normoactive bowel sounds, no rebound/guarding   MUSCULOSKELETAL: No lower extremity edema   PSYCH: A+O to person, place, and time; affect appropriate  NEUROLOGY: no gross sensory or motor deficits   SKIN: No rashes    LABS:                        11.3   14.38 )-----------( 264      ( 23 Sep 2023 06:50 )             34.2     09-23    131<L>  |  99  |  32<H>  ----------------------------<  120<H>  4.7   |  24  |  0.92    Ca    8.4      23 Sep 2023 06:50  Phos  2.8     09-23  Mg     2.50     09-23    TPro  7.0  /  Alb  2.7<L>  /  TBili  0.3  /  DBili  x   /  AST  11  /  ALT  16  /  AlkPhos  54  09-23          Urinalysis Basic - ( 23 Sep 2023 06:50 )    Color: x / Appearance: x / SG: x / pH: x  Gluc: 120 mg/dL / Ketone: x  / Bili: x / Urobili: x   Blood: x / Protein: x / Nitrite: x   Leuk Esterase: x / RBC: x / WBC x   Sq Epi: x / Non Sq Epi: x / Bacteria: x        SARS-CoV-2: Detected (20 Sep 2023 13:40)  SARS-CoV-2: NotDetec (29 Aug 2023 19:35)      RADIOLOGY & ADDITIONAL TESTS:  Other Results Reviewed Today: BMP with stable Cr, CBC with stable Hg     COORDINATION OF CARE:  Communication: discussed plan of care with ACP 
Ellen Castellon MD  [facility]  Pager: [phone]  Available via MS Teams    SUBJECTIVE / OVERNIGHT EVENTS:    SOB improving   No chest pain     MEDICATIONS  (STANDING):  apixaban 5 milliGRAM(s) Oral every 12 hours  atorvastatin 10 milliGRAM(s) Oral at bedtime  budesonide  80 MICROgram(s)/formoterol 4.5 MICROgram(s) Inhaler 2 Puff(s) Inhalation two times a day  dexAMETHasone     Tablet 6 milliGRAM(s) Oral daily  dextrose 5%. 1000 milliLiter(s) (50 mL/Hr) IV Continuous <Continuous>  dextrose 5%. 1000 milliLiter(s) (100 mL/Hr) IV Continuous <Continuous>  dextrose 50% Injectable 25 Gram(s) IV Push once  dextrose 50% Injectable 12.5 Gram(s) IV Push once  dextrose 50% Injectable 25 Gram(s) IV Push once  glucagon  Injectable 1 milliGRAM(s) IntraMuscular once  insulin lispro (ADMELOG) corrective regimen sliding scale   SubCutaneous three times a day before meals  insulin lispro (ADMELOG) corrective regimen sliding scale   SubCutaneous at bedtime  metoprolol tartrate 25 milliGRAM(s) Oral every 12 hours  montelukast 10 milliGRAM(s) Oral daily  remdesivir  IVPB   IV Intermittent   remdesivir  IVPB 100 milliGRAM(s) IV Intermittent every 24 hours  sodium zirconium cyclosilicate 10 Gram(s) Oral once  tamsulosin 0.4 milliGRAM(s) Oral at bedtime    MEDICATIONS  (PRN):  acetaminophen     Tablet .. 650 milliGRAM(s) Oral every 6 hours PRN Temp greater or equal to 38C (100.4F), Mild Pain (1 - 3)  albuterol    90 MICROgram(s) HFA Inhaler 2 Puff(s) Inhalation every 6 hours PRN for bronchospasm  aluminum hydroxide/magnesium hydroxide/simethicone Suspension 30 milliLiter(s) Oral every 4 hours PRN Dyspepsia  dextrose Oral Gel 15 Gram(s) Oral once PRN Blood Glucose LESS THAN 70 milliGRAM(s)/deciliter  guaiFENesin Oral Liquid (Sugar-Free) 200 milliGRAM(s) Oral every 6 hours PRN Cough  melatonin 3 milliGRAM(s) Oral at bedtime PRN Insomnia  ondansetron Injectable 4 milliGRAM(s) IV Push every 8 hours PRN Nausea and/or Vomiting      I&O's Summary    21 Sep 2023 07:01  -  22 Sep 2023 07:00  --------------------------------------------------------  IN: 620 mL / OUT: 700 mL / NET: -80 mL    22 Sep 2023 07:01  -  22 Sep 2023 12:49  --------------------------------------------------------  IN: 240 mL / OUT: 400 mL / NET: -160 mL        PHYSICAL EXAM:  Vital Signs Last 24 Hrs  T(C): 36.5 (22 Sep 2023 05:04), Max: 36.6 (21 Sep 2023 13:45)  T(F): 97.7 (22 Sep 2023 05:04), Max: 97.8 (21 Sep 2023 13:45)  HR: 43 (22 Sep 2023 06:45) (43 - 85)  BP: 118/72 (22 Sep 2023 06:45) (110/63 - 120/60)  BP(mean): --  RR: 18 (22 Sep 2023 05:04) (18 - 18)  SpO2: 98% (22 Sep 2023 05:04) (97% - 100%)    Parameters below as of 22 Sep 2023 05:04  Patient On (Oxygen Delivery Method): nasal cannula  O2 Flow (L/min): 2    CONSTITUTIONAL: NAD, well-developed   EYES: conjunctiva and sclera clear  ENMT: Moist oral mucosa   NECK: Supple   RESPIRATORY: Normal respiratory effort; lungs are clear to auscultation bilaterally  CARDIOVASCULAR: Regular rate and rhythm, normal S1 and S2, no murmur/rub/gallop; Peripheral pulses are 2+ bilaterally  ABDOMEN: Nontender to palpation, normoactive bowel sounds, no rebound/guarding   MUSCULOSKELETAL: No lower extremity edema   PSYCH: A+O to person, place, and time; affect appropriate  NEUROLOGY: no gross sensory or motor deficits   SKIN: No rashes    LABS:                        12.0   14.01 )-----------( 283      ( 22 Sep 2023 07:47 )             38.1     09-22    131<L>  |  98  |  30<H>  ----------------------------<  97  5.0   |  24  |  1.04    Ca    8.6      22 Sep 2023 07:47  Phos  3.0     09-22  Mg     2.70     09-22    TPro  7.4  /  Alb  2.7<L>  /  TBili  0.3  /  DBili  x   /  AST  13  /  ALT  20  /  AlkPhos  53  09-22    PT/INR - ( 20 Sep 2023 13:38 )   PT: 19.7 sec;   INR: 1.79 ratio         PTT - ( 20 Sep 2023 13:38 )  PTT:30.0 sec      Urinalysis Basic - ( 22 Sep 2023 07:47 )    Color: x / Appearance: x / SG: x / pH: x  Gluc: 97 mg/dL / Ketone: x  / Bili: x / Urobili: x   Blood: x / Protein: x / Nitrite: x   Leuk Esterase: x / RBC: x / WBC x   Sq Epi: x / Non Sq Epi: x / Bacteria: x        SARS-CoV-2: Detected (20 Sep 2023 13:40)  SARS-CoV-2: NotDetec (29 Aug 2023 19:35)      RADIOLOGY & ADDITIONAL TESTS:  Other Results Reviewed Today: BMP with stable Cr, CBC with stable Hg     COORDINATION OF CARE:  Communication: discussed plan of care with ACP

## 2023-09-23 NOTE — PROGRESS NOTE ADULT - PROBLEM SELECTOR PLAN 4
- will give lokelma dose x1   - low k diet   - improved
- will give lokelma dose x1   - low k diet   - improved
- treat with hyperk cocktail   - repeat BMP today

## 2023-09-23 NOTE — PROGRESS NOTE ADULT - PROBLEM SELECTOR PLAN 7
C/w ISS and FS   -HgbA1c 7.9   - may need endocrine as pt now on steroids for COVID

## 2023-09-23 NOTE — PROGRESS NOTE ADULT - PROBLEM SELECTOR PLAN 1
Patient with acute hypoxic respiratory failure   - Due to COVID-19 in the setting of chronic ILD   - CXR showed no focal PNA, monitor off abx   - COVID-19 treatment as below.   - ILD mgmt as below   - Wean off oxygen as tolerated
Patient with acute hypoxic respiratory failure   -Due to COVID-19 in the setting of chronic ILD   -CXR showed no focal PNA, monitor off abx   -COVID-19 treatment as below.   -Wean off oxygen as tolerated.
Patient with acute hypoxic respiratory failure   - Due to COVID-19 in the setting of chronic ILD   - CXR showed no focal PNA, monitor off abx   - COVID-19 treatment as below.   - ILD mgmt as below   - Wean off oxygen as tolerated

## 2023-09-23 NOTE — PROGRESS NOTE ADULT - PROBLEM SELECTOR PLAN 6
Patient with hx Afib   - if needed increased metoprolol 25 mg to q8 for RVR    -C/w eliquis 5 mg BID

## 2023-09-23 NOTE — PROGRESS NOTE ADULT - PROBLEM SELECTOR PLAN 3
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 OP follow up per rheum

## 2023-09-24 ENCOUNTER — TRANSCRIPTION ENCOUNTER (OUTPATIENT)
Age: 88
End: 2023-09-24

## 2023-09-24 VITALS
HEART RATE: 70 BPM | SYSTOLIC BLOOD PRESSURE: 125 MMHG | OXYGEN SATURATION: 99 % | TEMPERATURE: 98 F | RESPIRATION RATE: 18 BRPM | DIASTOLIC BLOOD PRESSURE: 70 MMHG

## 2023-09-24 LAB
ALBUMIN SERPL ELPH-MCNC: 2.6 G/DL — LOW (ref 3.3–5)
ALP SERPL-CCNC: 54 U/L — SIGNIFICANT CHANGE UP (ref 40–120)
ALT FLD-CCNC: 14 U/L — SIGNIFICANT CHANGE UP (ref 4–41)
ANION GAP SERPL CALC-SCNC: 12 MMOL/L — SIGNIFICANT CHANGE UP (ref 7–14)
AST SERPL-CCNC: 10 U/L — SIGNIFICANT CHANGE UP (ref 4–40)
BILIRUB SERPL-MCNC: 0.3 MG/DL — SIGNIFICANT CHANGE UP (ref 0.2–1.2)
BUN SERPL-MCNC: 27 MG/DL — HIGH (ref 7–23)
CALCIUM SERPL-MCNC: 8.1 MG/DL — LOW (ref 8.4–10.5)
CHLORIDE SERPL-SCNC: 96 MMOL/L — LOW (ref 98–107)
CO2 SERPL-SCNC: 24 MMOL/L — SIGNIFICANT CHANGE UP (ref 22–31)
CREAT SERPL-MCNC: 0.82 MG/DL — SIGNIFICANT CHANGE UP (ref 0.5–1.3)
EGFR: 85 ML/MIN/1.73M2 — SIGNIFICANT CHANGE UP
GLUCOSE BLDC GLUCOMTR-MCNC: 162 MG/DL — HIGH (ref 70–99)
GLUCOSE BLDC GLUCOMTR-MCNC: 178 MG/DL — HIGH (ref 70–99)
GLUCOSE BLDC GLUCOMTR-MCNC: 191 MG/DL — HIGH (ref 70–99)
GLUCOSE SERPL-MCNC: 120 MG/DL — HIGH (ref 70–99)
HCT VFR BLD CALC: 37.9 % — LOW (ref 39–50)
HGB BLD-MCNC: 12.2 G/DL — LOW (ref 13–17)
MAGNESIUM SERPL-MCNC: 2.3 MG/DL — SIGNIFICANT CHANGE UP (ref 1.6–2.6)
MCHC RBC-ENTMCNC: 28.8 PG — SIGNIFICANT CHANGE UP (ref 27–34)
MCHC RBC-ENTMCNC: 32.2 GM/DL — SIGNIFICANT CHANGE UP (ref 32–36)
MCV RBC AUTO: 89.6 FL — SIGNIFICANT CHANGE UP (ref 80–100)
NRBC # BLD: 0 /100 WBCS — SIGNIFICANT CHANGE UP (ref 0–0)
NRBC # FLD: 0 K/UL — SIGNIFICANT CHANGE UP (ref 0–0)
PHOSPHATE SERPL-MCNC: 2.5 MG/DL — SIGNIFICANT CHANGE UP (ref 2.5–4.5)
PLATELET # BLD AUTO: 245 K/UL — SIGNIFICANT CHANGE UP (ref 150–400)
POTASSIUM SERPL-MCNC: 4.6 MMOL/L — SIGNIFICANT CHANGE UP (ref 3.5–5.3)
POTASSIUM SERPL-SCNC: 4.6 MMOL/L — SIGNIFICANT CHANGE UP (ref 3.5–5.3)
PROT SERPL-MCNC: 6.6 G/DL — SIGNIFICANT CHANGE UP (ref 6–8.3)
RBC # BLD: 4.23 M/UL — SIGNIFICANT CHANGE UP (ref 4.2–5.8)
RBC # FLD: 13.3 % — SIGNIFICANT CHANGE UP (ref 10.3–14.5)
SODIUM SERPL-SCNC: 132 MMOL/L — LOW (ref 135–145)
WBC # BLD: 11.94 K/UL — HIGH (ref 3.8–10.5)
WBC # FLD AUTO: 11.94 K/UL — HIGH (ref 3.8–10.5)

## 2023-09-24 PROCEDURE — 99239 HOSP IP/OBS DSCHRG MGMT >30: CPT

## 2023-09-24 RX ORDER — ACETAMINOPHEN 500 MG
2 TABLET ORAL
Qty: 0 | Refills: 0 | DISCHARGE
Start: 2023-09-24

## 2023-09-24 RX ORDER — LOSARTAN POTASSIUM 100 MG/1
0.5 TABLET, FILM COATED ORAL
Qty: 0 | Refills: 0 | DISCHARGE

## 2023-09-24 RX ORDER — BUDESONIDE AND FORMOTEROL FUMARATE DIHYDRATE 160; 4.5 UG/1; UG/1
1 AEROSOL RESPIRATORY (INHALATION)
Qty: 1 | Refills: 0
Start: 2023-09-24 | End: 2023-10-23

## 2023-09-24 RX ORDER — DEXAMETHASONE 0.5 MG/5ML
1 ELIXIR ORAL
Qty: 6 | Refills: 0
Start: 2023-09-24 | End: 2023-09-29

## 2023-09-24 RX ORDER — METOPROLOL TARTRATE 50 MG
1 TABLET ORAL
Qty: 0 | Refills: 0 | DISCHARGE
Start: 2023-09-24

## 2023-09-24 RX ADMIN — APIXABAN 5 MILLIGRAM(S): 2.5 TABLET, FILM COATED ORAL at 18:24

## 2023-09-24 RX ADMIN — MONTELUKAST 10 MILLIGRAM(S): 4 TABLET, CHEWABLE ORAL at 11:46

## 2023-09-24 RX ADMIN — REMDESIVIR 200 MILLIGRAM(S): 5 INJECTION INTRAVENOUS at 18:46

## 2023-09-24 RX ADMIN — Medication 6 MILLIGRAM(S): at 05:39

## 2023-09-24 RX ADMIN — Medication 1: at 18:24

## 2023-09-24 RX ADMIN — Medication 1: at 09:11

## 2023-09-24 RX ADMIN — Medication 25 MILLIGRAM(S): at 18:23

## 2023-09-24 RX ADMIN — Medication 1: at 13:39

## 2023-09-24 RX ADMIN — APIXABAN 5 MILLIGRAM(S): 2.5 TABLET, FILM COATED ORAL at 05:39

## 2023-09-24 RX ADMIN — BUDESONIDE AND FORMOTEROL FUMARATE DIHYDRATE 2 PUFF(S): 160; 4.5 AEROSOL RESPIRATORY (INHALATION) at 08:23

## 2023-09-24 RX ADMIN — Medication 25 MILLIGRAM(S): at 05:39

## 2023-09-24 NOTE — DISCHARGE NOTE PROVIDER - NSDCMRMEDTOKEN_GEN_ALL_CORE_FT
albuterol 90 mcg/inh inhalation aerosol: 2 puff(s) inhaled every 6 hours As needed for bronchospasm  apixaban 5 mg oral tablet: 1 tab(s) orally 2 times a day  atorvastatin 10 mg oral tablet: 1 tab(s) orally once a day (at bedtime)  budesonide-formoterol 80 mcg-4.5 mcg/inh inhalation aerosol: 2 puff(s) inhaled 2 times a day  Cozaar 100 mg oral tablet: 0.5 tab(s) orally once a day Take  half tablet (50mg) daily  starting 9/2/23  glimepiride 2 mg oral tablet: 1 tab(s) orally 2 times a day  Jardiance 10 mg oral tablet: 1 tab(s) orally once a day  metoprolol tartrate 25 mg oral tablet: 1 tab(s) orally every 12 hours  montelukast 10 mg oral tablet: 1 tab(s) orally once a day  tamsulosin 0.4 mg oral capsule: 1 cap(s) orally once a day (at bedtime)   acetaminophen 325 mg oral tablet: 2 tab(s) orally every 6 hours As needed Temp greater or equal to 38C (100.4F), Mild Pain (1 - 3)  albuterol 90 mcg/inh inhalation aerosol: 2 puff(s) inhaled every 6 hours As needed for bronchospasm  aluminum hydroxide-magnesium hydroxide 200 mg-200 mg/5 mL oral suspension: 30 milliliter(s) orally every 4 hours As needed Dyspepsia  apixaban 5 mg oral tablet: 1 tab(s) orally 2 times a day  atorvastatin 10 mg oral tablet: 1 tab(s) orally once a day (at bedtime)  budesonide-formoterol 160 mcg-4.5 mcg/inh inhalation aerosol: 1 puff(s) inhaled 2 times a day  dexAMETHasone 6 mg oral tablet: 1 tab(s) orally once a day  glimepiride 2 mg oral tablet: 1 tab(s) orally 2 times a day  Jardiance 10 mg oral tablet: 1 tab(s) orally once a day  metoprolol tartrate 25 mg oral tablet: 1 tab(s) orally every 12 hours  montelukast 10 mg oral tablet: 1 tab(s) orally once a day  tamsulosin 0.4 mg oral capsule: 1 cap(s) orally once a day (at bedtime)

## 2023-09-24 NOTE — DISCHARGE NOTE NURSING/CASE MANAGEMENT/SOCIAL WORK - NSDCPEFALRISK_GEN_ALL_CORE
For information on Fall & Injury Prevention, visit: [email or web address] OR  [email or web address] OR  https://www.cdc.gov/steadi/patient.html

## 2023-09-24 NOTE — DISCHARGE NOTE PROVIDER - NSDCCPCAREPLAN_GEN_ALL_CORE_FT
PRINCIPAL DISCHARGE DIAGNOSIS  Diagnosis: COVID-19  Assessment and Plan of Treatment: - You were found to have COVID - 19   - Because you have a lung condition in the past, this made you really high risk   - We treated you with 5-day course of Remdesivir   - Please take an additional 5 days of Dexamethasone 6 mg daily to complete a 10 day course      SECONDARY DISCHARGE DIAGNOSES  Diagnosis: ILD (interstitial lung disease)  Assessment and Plan of Treatment: Please see the lung and rheumatology doctors in clinic for further care    Diagnosis: Chronic atrial fibrillation  Assessment and Plan of Treatment: Continue lopressor and eliquis

## 2023-09-24 NOTE — DISCHARGE NOTE NURSING/CASE MANAGEMENT/SOCIAL WORK - PATIENT PORTAL LINK FT
You can access the FollowMyHealth Patient Portal offered by [facility] by registering at the following website: [email or web address]. By joining [employer]’s FollowMyHealth portal, you will also be able to view your health information using other applications (apps) compatible with our system.

## 2023-09-24 NOTE — DISCHARGE NOTE PROVIDER - HOSPITAL COURSE
86 yo M with PMhx of ILD, HTN, DMII, afib on eliquis, and HLD presents to the ED with worsening SOB, found to have COVID-19.     #Acute respiratory failure with hypoxia.   #2019 novel coronavirus disease (COVID-19).   - Patient with worsening SOB found to have COVID-19   - With hypoxia requiring O2 on admission, which was weaned off through admission   - CXR shows chronic ILD   - Completed 5-day course of Remdesivir  - Dexamethasone 6 mg daily for 10 days  - pulm consulted, needs continued follow up     #ILD (interstitial lung disease).   - Patient with chronic ILD   - increase Symbicort to 160-4.5 mcg 2 puffs twice daily  - continue montelukast 10 mg daily  - pulm consulted. 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 No further work up inpatient per rheum. Needs OP follow up     #Hyponatremia.   Chronic, at baseline     #Chronic atrial fibrillation.   - continue metoprolol 25 mg BID    -C/w eliquis 5 mg BID.    #Type 2 diabetes mellitus.   -HgbA1c 7.9   - continue home meds     Medically Stable for discharge

## 2023-09-24 NOTE — DISCHARGE NOTE PROVIDER - ATTENDING DISCHARGE PHYSICAL EXAMINATION:
Constitutional: NAD, resting comfortably   Head: NC/AT  Eyes: anicteric sclera  Respiratory: clear to ascultation b/l, No wheezing or rhonchi, no retractions   Cardiac: +S1/S2; RRR; no M/R/G  Gastrointestinal: abdomen soft, non-distended, no rebound or guarding; +BSx4  Extremities: no peripheral edema  Musculoskeletal: NROM x4; no joint swelling, tenderness or erythema  Vascular: 2+ radial, DP pulses B/L  Neurologic: AAOx3; no gross focal deficits  Psychiatric: affect and characteristics of appearance, verbalizations, behaviors are appropriate

## 2023-09-25 ENCOUNTER — TRANSCRIPTION ENCOUNTER (OUTPATIENT)
Age: 88
End: 2023-09-25

## 2023-09-27 ENCOUNTER — APPOINTMENT (OUTPATIENT)
Dept: PULMONOLOGY | Facility: CLINIC | Age: 88
End: 2023-09-27
Payer: MEDICARE

## 2023-09-27 DIAGNOSIS — J44.9 CHRONIC OBSTRUCTIVE PULMONARY DISEASE, UNSPECIFIED: ICD-10-CM

## 2023-09-27 PROCEDURE — 99496 TRANSJ CARE MGMT HIGH F2F 7D: CPT | Mod: 95

## 2023-09-27 RX ORDER — BENZONATATE 100 MG/1
100 CAPSULE ORAL 3 TIMES DAILY
Qty: 30 | Refills: 1 | Status: ACTIVE | COMMUNITY
Start: 2023-09-27 | End: 1900-01-01

## 2023-09-27 RX ORDER — ALBUTEROL SULFATE 2.5 MG/3ML
(2.5 MG/3ML) SOLUTION RESPIRATORY (INHALATION)
Qty: 1 | Refills: 5 | Status: ACTIVE | COMMUNITY
Start: 2023-09-27 | End: 1900-01-01

## 2023-10-24 NOTE — CONSULT NOTE ADULT - ASSESSMENT
88 yo M with PMhx of ILD, HTN, DMII, afib on eliquis, and HLD presents to the ED with worsening SOB. Pulm consulted as pt has acute hypoxic resp failure iso of COVID 19, ILD and COPD. Dyspnea on exertion is baseline per history. New wheezing and oxygen requirement suspect to be exacerbation of ILD and COPD iso of COVID 19 infections.    Recommendations:  - cont albuterol inhaler q6 as needed  - cont symbicort BID  - no additional steroid as pt is on dex 6 mg QD for 10 days  - agree with remdesivir   - wean off oxygen as tolerated  - walking pulse ox prior to discharge   - anti ssa 52 kd was elevated form prior ILD work up. this can be seen in autoimmune disease. Can consider Rheum consult    Yael Barr) EMIM PGY-2  All recommendation tentative until signed by attending.          none

## 2023-12-07 ENCOUNTER — APPOINTMENT (OUTPATIENT)
Dept: RHEUMATOLOGY | Facility: CLINIC | Age: 88
End: 2023-12-07
